# Patient Record
Sex: FEMALE | Race: WHITE | NOT HISPANIC OR LATINO | Employment: OTHER | URBAN - METROPOLITAN AREA
[De-identification: names, ages, dates, MRNs, and addresses within clinical notes are randomized per-mention and may not be internally consistent; named-entity substitution may affect disease eponyms.]

---

## 2018-04-03 PROBLEM — E07.9 THYROID DISEASE: Status: ACTIVE | Noted: 2018-04-03

## 2018-04-03 PROBLEM — E23.0 PANHYPOPITUITARISM: Status: ACTIVE | Noted: 2018-04-03

## 2018-04-03 PROBLEM — E27.40 ADRENAL INSUFFICIENCY: Status: ACTIVE | Noted: 2018-04-03

## 2018-04-03 PROBLEM — R53.1 WEAKNESS: Status: ACTIVE | Noted: 2018-04-03

## 2018-04-03 PROBLEM — M19.90 ARTHRITIS: Status: ACTIVE | Noted: 2018-04-03

## 2018-04-03 PROBLEM — R29.898 LEFT HAND WEAKNESS: Status: ACTIVE | Noted: 2018-04-03

## 2018-04-05 ENCOUNTER — TELEPHONE (OUTPATIENT)
Dept: NEUROLOGY | Facility: CLINIC | Age: 43
End: 2018-04-05

## 2018-04-05 NOTE — TELEPHONE ENCOUNTER
----- Message from Krystle Bell sent at 4/5/2018 10:53 AM CDT -----  Contact: [facility]   Patient need a hospital follow up in 2 weeks please call back at [phone] (home)

## 2018-04-05 NOTE — TELEPHONE ENCOUNTER
Tried to call the patient. No answer. Left voicemail to call back in regards to scheduling an appointment.

## 2018-05-03 ENCOUNTER — HOSPITAL ENCOUNTER (OUTPATIENT)
Dept: RADIOLOGY | Facility: HOSPITAL | Age: 43
Discharge: HOME | End: 2018-05-03
Attending: INTERNAL MEDICINE
Payer: MEDICARE

## 2018-05-03 DIAGNOSIS — R42 DIZZINESS AND GIDDINESS: ICD-10-CM

## 2018-05-03 PROCEDURE — 93880 EXTRACRANIAL BILAT STUDY: CPT | Mod: 26,,, | Performed by: RADIOLOGY

## 2018-05-03 PROCEDURE — 93880 EXTRACRANIAL BILAT STUDY: CPT | Mod: TC,PO

## 2018-05-23 ENCOUNTER — TELEPHONE (OUTPATIENT)
Dept: NEUROLOGY | Facility: CLINIC | Age: 43
End: 2018-05-23

## 2018-05-23 NOTE — TELEPHONE ENCOUNTER
Returned call and spoke with patient. Patient rescheduled for 6/18 at 4:30 pm. Patient verbalized understanding.

## 2018-05-23 NOTE — TELEPHONE ENCOUNTER
----- Message from Magdalena Escobar sent at 5/23/2018  1:07 PM CDT -----  Contact: Patient's mom, Sheridan Duong  Patient's mom is calling to reschedule patient's appointment that she has for today because the patient has really bad stomach cramps and I am not getting another appointment until July.  Please call to reschedule.  Call Back#[phone]  Thanks

## 2018-06-18 ENCOUNTER — LAB VISIT (OUTPATIENT)
Dept: LAB | Facility: HOSPITAL | Age: 43
End: 2018-06-18
Attending: PSYCHIATRY & NEUROLOGY
Payer: MEDICARE

## 2018-06-18 ENCOUNTER — OFFICE VISIT (OUTPATIENT)
Dept: NEUROLOGY | Facility: CLINIC | Age: 43
End: 2018-06-18
Payer: MEDICARE

## 2018-06-18 VITALS
DIASTOLIC BLOOD PRESSURE: 76 MMHG | SYSTOLIC BLOOD PRESSURE: 105 MMHG | HEIGHT: 63 IN | RESPIRATION RATE: 16 BRPM | BODY MASS INDEX: 23.57 KG/M2 | WEIGHT: 133 LBS | HEART RATE: 90 BPM

## 2018-06-18 DIAGNOSIS — Z79.891 CHRONICALLY ON OPIATE THERAPY: ICD-10-CM

## 2018-06-18 DIAGNOSIS — M54.50 CHRONIC BILATERAL LOW BACK PAIN WITHOUT SCIATICA: ICD-10-CM

## 2018-06-18 DIAGNOSIS — G89.29 CHRONIC BILATERAL LOW BACK PAIN WITHOUT SCIATICA: ICD-10-CM

## 2018-06-18 DIAGNOSIS — R29.898 LEFT HAND WEAKNESS: ICD-10-CM

## 2018-06-18 DIAGNOSIS — E23.0 PANHYPOPITUITARISM: ICD-10-CM

## 2018-06-18 DIAGNOSIS — Z79.891 CHRONICALLY ON OPIATE THERAPY: Primary | ICD-10-CM

## 2018-06-18 PROCEDURE — 99999 PR PBB SHADOW E&M-EST. PATIENT-LVL IV: CPT | Mod: PBBFAC,,, | Performed by: PSYCHIATRY & NEUROLOGY

## 2018-06-18 PROCEDURE — 3008F BODY MASS INDEX DOCD: CPT | Mod: S$GLB,,, | Performed by: PSYCHIATRY & NEUROLOGY

## 2018-06-18 PROCEDURE — 80307 DRUG TEST PRSMV CHEM ANLYZR: CPT

## 2018-06-18 PROCEDURE — 99214 OFFICE O/P EST MOD 30 MIN: CPT | Mod: S$GLB,,, | Performed by: PSYCHIATRY & NEUROLOGY

## 2018-06-18 NOTE — PATIENT INSTRUCTIONS
TESTING:  -- request records from Dr. Hunter Lorenzo today  -- urine drug screen today     PREVENTION OF PAIN:   -- continue with above Dr for now until results obtained     AS-NEEDED TREATMENT OF PAIN:  -- continue with above Dr for now until results obtained

## 2018-06-18 NOTE — PROGRESS NOTES
Date of service: 6/18/2018  Referring provider: No ref. provider found    Subjective:      Chief complaint: Weakness (left hand) and Back Pain (mid/low back pain)       Patient ID: Radha Ramirez is a 42 y.o. 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 She has had no new weakness occur.     She currently follows for chronic pain management with neurologist Dr. Hunter Gayle in [facility] but requests to change to me to have closer to home provider. Her main pain is axial back pain with radiation down the legs to the level of the knees. Her current pain score is 8. She last filled her medications on 5/26. Her stable regimen is listed below. Her mother expresses concern as to whether anything else can be done to minimize her medications. She reports she cannot get ESIs due to her chronic steroid use. She reports she has never received lumbar facet blocks / RFAs.    Her pain is currently flared because she recently became dizzy in the setting of UTI, poor PO intake, and fell, on her sacrum. Was evaluated in the ED. XR showed chronic L1 fracture, age indeterminate but new since 4/3/18 T9 fracture, and possible fracture at the sacrococcygeal junction. She was discharged after having been given toradol and percocet in the ED.     ORIGINAL NEURO HISTORY - 4/5/18   Patient woke up yesterday 6am to use restroom, noticed left hand was weak. Went back to sleep woke up at 1030 am, still weak. Reports she cannot extend the fingers. Wrsit ok. NO PAIN. There is no numbness or tingling at all. She slept on her  back, with the left wrist extended and under her chin, but was never on her humerus. No previous episodes.      Incidentally found to have optic nerve thinning by eye doctor - undergoing visual fields for further diagnosis.      Takes MS contin, percocet, benadryl, flexeril before bed but all doses were typical. No alcohol before bed.     HOSPITAL COURSE:  4/5/18: no clinical changes. OT recommended outpatient therapy. MRI cervical done and normal. No pain.      #1 Left hand weakness - occurring after waking up after sleeping with left wrist extended supporting the chin. No numbness at all and no pain. Because this initially appeared to me to be involvement of 3 separate peripheral nerves (median, ulnar, radial) or C7-T1 roots and patient has bilateral babinski I recommend cervical spine imaging with contrast which was completely normal. 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 This single nerve explains all her motor weakness and as it is a pure motor nerve explains why she has no sensory loss. Her inflammatory markers are elevated but the sudden onset and complete lack of pain argue against a mononeuritis multilpex or an early parsonage reina as the diagnosis. Thus, I do think this is compressive, from a position she sustained while sleeping. I expect this to resolve or markedly improve within 3 months.     Current acute treatment:  -- MS Contin 15mg nightly  -- Percocet 7.5mg/325mg QID     Current prevention:    Previously tried:     Procedural history:   -- none      Review of patient's allergies indicates:  No Known  "Allergies  Current Outpatient Prescriptions   Medication Sig Dispense Refill    calcitRIOL (ROCALTROL) 0.5 MCG Cap Take 0.5 mcg by mouth every Mon, Wed, Fri.      cephALEXin (KEFLEX) 500 MG capsule Take 1 capsule (500 mg total) by mouth 4 (four) times daily. 10 capsule 0    cyclobenzaprine (FLEXERIL) 10 MG tablet Take 10 mg by mouth 2 (two) times daily.      ergocalciferol (VITAMIN D2) 50,000 unit Cap Take 50,000 Units by mouth every 7 days.      hydrocortisone (CORTEF) 20 MG Tab Take 10 mg by mouth once daily.      levothyroxine (SYNTHROID, LEVOTHROID) 175 MCG tablet Take 175 mcg by mouth once daily.      liothyronine (CYTOMEL) 5 MCG Tab Take 25 mcg by mouth once daily.      morphine (MS CONTIN) 15 MG 12 hr tablet Take 15 mg by mouth once daily.      oxycodone-acetaminophen (PERCOCET) 5-325 mg per tablet Take 1 tablet by mouth every 6 (six) hours as needed for Pain. 12 tablet 0    promethazine (PHENERGAN) 25 MG tablet Take 25 mg by mouth every 4 (four) hours.       No current facility-administered medications for this visit.        Past Medical History  Past Medical History:   Diagnosis Date    Adrenal insufficiency     Arthritis     "Bulging and herniated lumbar discs"     Chronic instability of knee     Humerus fracture     Panhypopituitarism     Pelvic fracture     Rib fracture     Thyroid disease        Past Surgical History  No past surgical history on file.    Family History  No family history on file.    Social History  Social History     Social History    Marital status: Single     Spouse name: N/A    Number of children: N/A    Years of education: N/A     Occupational History    Not on file.     Social History Main Topics    Smoking status: Never Smoker    Smokeless tobacco: Never Used    Alcohol use No    Drug use: No    Sexual activity: Not on file     Other Topics Concern    Not on file     Social History Narrative    No narrative on file        Review of Systems  14-point " review of systems as follows:   No check adore indicates NEGATIVE response   Constitutional: [] weight loss, [] change to appetite   Eyes: [] change in vision, [] double vision   Ears, nose, mouth, throat: [] frequent nose bleeds, [] ringing in the ears   Respiratory: [] cough, [] wheezing   Cardiovascular: [] chest pain, [] palpitations   Gastrointestinal: [] jaundice, [x] nausea/vomiting   Genitourinary: [] incontinence, [] burning with urination   Hematologic/lymphatic: [x] easy bruising/bleeding, [] night sweats   Neurological: [] numbness, [] weakness   Endocrine: [] fatigue, [] heat/cold intolerance   Allergy/Immunologic: [] fevers, [] chills   Musculoskeletal: [x] muscle pain, [x] joint pain   Psychiatric: [] thoughts of harming self/others, [] depression   Integumentary: [] rashes, [] sores that do not heal     Objective:        Vitals:    06/18/18 1634   BP: 105/76   Pulse: 90   Resp: 16     Body mass index is 23.56 kg/m².    General: Well developed, well nourished.  No acute distress.   HEENT: Atraumatic, normocephalic. Poor dentition.   Neck: Trachea midline  Cardiovascular: Vitals reviewed. Normal peripheral perfusion.   Pulmonary: No increased work of breathing.  Abdomen/GI: No guarding  Musculoskeletal: No obvious joint deformities, moves all extremities symmetrically and well. Hamstrings tight bilaterally. + low lumbar paraspinal tenderness, no SI joint tenderness, no sacral tenderness, JI negative, SLR negative      Neurological exam:  Mental status: Awake and alert. Oriented to situation.  Speech fluent and appropriate. Recent and remote memory appear to be intact.  Fund of knowledge normal.  Cranial nerves: Pupils equal round, extraocular movements intact, facial strength intact bilaterally, hearing grossly intact bilaterally.  Motor: left wrist/finger and thumb extension 5/5   Gait: wheelchair       Data Review:     I have personally reviewed the referring provider's notes, labs, & imaging made  available to me today.     RADIOLOGY STUDIES:  I have personally reviewed the pertinent images performed.     6/8/15 MRI lumbar spine w/o contrast:  L1-2: minimal broad based disc protrusion, moderate facet arthropathy  L2-3: mild broad based disc protrusion L > R, annular tear, moderate facet arthropathy, mild stenosis left lateral recess (with mild compression of left L3 root)  L3-4: mild broad based disc protrusion, mild/moderate facet arthropathy   L4-5: mild broad based disc protrusion, moderate/severe facet arthropathy, mild lateral recess stenosis (with mild compression of L5 nerve roots)  L5-S1: transitional vertebra. Mild broad based disc protrusion R > L. Moderate facet arthropathy. Mild/mod compression of right lateral recess with compression of right S1 root.  Chronic L1 fracture   Chronic healed T11 fracture     Results for orders placed or performed during the hospital encounter of 04/03/18   MRI Brain W WO Contrast    Narrative     MRI BRAIN WITH AND WITHOUT CONTRAST    CPT: 98380    Clinical data:  Neurological deficit.  Left hand paralysis.    Comparison: CT head 04/03/2018    Technique: Multiplanar, multisequence MRI of the brain was performed before and after administration of contrast.     Findings:     The diffusion weighted imaging is negative for acute or subacute ischemia.  Gray-white matter differentiation appears within normal limits.  No extra-axial fluid collection, hydrocephalus, mass effect, midline shift is identified.  There is no acute hemorrhage.  Visualized vascular flow voids appear intact.  Visualized orbits appear unremarkable.  Visualized paranasal sinuses and mastoid air cells appear clear.  No mass is identified.  No pathologic enhancement is noted.  There is demonstrated a shallow sella turcica with minimal pituitary tissue identified.    Impression       1. No acute intracranial abnormality.  2. No pathologic enhancement.      Electronically signed by: NITHIN HOYT  MD  Date:     04/03/18  Time:    19:40    CT Head Without Contrast    Narrative    CT scan of the head without contrast    Clinical history: Weakness in the fingers of the left hand    [id number]    Findings: Multiple computerized images of the head were obtained in axial projection using thin slices.  No intravenous contrast was administered.  The study was viewed at soft tissue and bone window settings.  Reconstructions were done in coronal and sagittal planes.    No focal lesions are identified.  There is no evidence of ventricular dilatation, midline shift, or hemorrhage.  No territorial infarct was visualized.  The gray-white junction is maintained throughout.  No extracerebral fluid collection was demonstrated.  The bony calvarium appears intact.  No sinus or mastoid abnormality was noted.  The sella is shallow and very small which is of uncertain significance.  This might be better evaluated with an MRI scan.    Impression     There are no acute findings.  Note is made of the sella being shallow and somewhat small which is of uncertain significance.  An MRI scan may be helpful in further evaluation of this area.      Electronically signed by: RUTHIE MO MD  Date:     04/03/18  Time:    16:23        Lab Results   Component Value Date     (L) 06/17/2018    K 3.6 06/17/2018    MG 1.7 04/03/2018    CL 96 06/17/2018    CO2 27 06/17/2018    BUN 11 06/17/2018    CREATININE 0.74 06/17/2018    GLU 71 06/17/2018    HGBA1C 4.9 04/04/2018    AST 86 (H) 06/17/2018    ALT 46 (H) 06/17/2018    ALBUMIN 3.3 (L) 06/17/2018    PROT 6.4 06/17/2018    BILITOT 0.3 06/17/2018       Lab Results   Component Value Date    WBC 5.49 06/17/2018    HGB 11.1 (L) 06/17/2018    HCT 33.1 (L) 06/17/2018    MCV 85 06/17/2018     06/17/2018       Lab Results   Component Value Date    TSH 0.918 04/03/2018       Assessment & Plan:       Problem List Items Addressed This Visit        Psychiatric    Chronically on opiate therapy -  Primary    Overview     Patient would like me to take over her pain management. Request recent clinic visits / procedure notes from current physician and obtain urine drug screen today. If all concordant then will take over starting next month.          Relevant Orders    Pain Clinic Drug Screen       Endocrine    Panhypopituitarism    Overview     Since birth, on chronic hydrocortisone and thyroid replacement            Orthopedic    Left hand weakness    Overview     Due to acute posterior interosseus neuropathy (branch of radial) likely related to sleeping position. Resolved after 1 week without further problems.          Chronic bilateral low back pain without sciatica    Overview     Axial back pain more so that radicular radiation down to knees. 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 Paraspinals tender. SLR negative. SI joint non tender. Pain most likely facetogenic. Request records from previous MD and likely offer bilateral LMBB in the future.                    TESTING:  -- request records from Dr. Hunter Lorenzo today  -- urine drug screen today     PREVENTION OF PAIN:   -- continue with above Dr for now until results obtained     AS-NEEDED TREATMENT OF PAIN:  -- continue with above Dr for now until results obtained     Follow-up in about 4 weeks (around 7/16/2018).    Kelly Prajapati MD

## 2018-06-21 ENCOUNTER — TELEPHONE (OUTPATIENT)
Dept: NEUROLOGY | Facility: CLINIC | Age: 43
End: 2018-06-21

## 2018-06-24 LAB
6MAM UR QL: NOT DETECTED
7AMINOCLONAZEPAM UR QL: NOT DETECTED
A-OH ALPRAZ UR QL: NOT DETECTED
ALPRAZ UR QL: NOT DETECTED
AMPHET UR QL SCN: NOT DETECTED
ANNOTATION COMMENT IMP: NORMAL
ANNOTATION COMMENT IMP: NORMAL
BARBITURATES UR QL: NOT DETECTED
BUPRENORPHINE UR QL: NOT DETECTED
BZE UR QL: NOT DETECTED
CARBOXYTHC UR QL: NOT DETECTED
CARISOPRODOL UR QL: NOT DETECTED
CLONAZEPAM UR QL: NOT DETECTED
CODEINE UR QL: NOT DETECTED
CREAT UR-MCNC: 81.9 MG/DL (ref 20–400)
DIAZEPAM UR QL: NOT DETECTED
ETHYL GLUCURONIDE UR QL: NOT DETECTED
FENTANYL UR QL: PRESENT
HYDROCODONE UR QL: NOT DETECTED
HYDROMORPHONE UR QL: NOT DETECTED
LORAZEPAM UR QL: NOT DETECTED
MDA UR QL: NOT DETECTED
MDEA UR QL: NOT DETECTED
MDMA UR QL: NOT DETECTED
ME-PHENIDATE UR QL: NOT DETECTED
MEPERIDINE UR QL: NOT DETECTED
METHADONE UR QL: NOT DETECTED
METHAMPHET UR QL: NOT DETECTED
MIDAZOLAM UR QL SCN: NOT DETECTED
MORPHINE UR QL: PRESENT
NORBUPRENORPHINE UR QL CFM: NOT DETECTED
NORDIAZEPAM UR QL: NOT DETECTED
NORFENTANYL UR QL: PRESENT
NORHYDROCODONE UR QL CFM: NOT DETECTED
NOROXYCODONE UR QL CFM: PRESENT
NOROXYMORPHONE: NOT DETECTED
OXAZEPAM UR QL: NOT DETECTED
OXYCODONE UR QL: PRESENT
OXYMORPHONE UR QL: NOT DETECTED
PATHOLOGY STUDY: NORMAL
PCP UR QL: NOT DETECTED
PHENTERMINE UR QL: NOT DETECTED
PROPOXYPH UR QL: NOT DETECTED
SERVICE CMNT-IMP: NORMAL
TAPENTADOL UR QL SCN: NOT DETECTED
TAPENTADOL-O-SULF: NOT DETECTED
TEMAZEPAM UR QL: NOT DETECTED
TRAMADOL UR QL: NOT DETECTED
ZOLPIDEM UR QL: NOT DETECTED

## 2018-07-17 ENCOUNTER — TELEPHONE (OUTPATIENT)
Dept: NEUROLOGY | Facility: CLINIC | Age: 43
End: 2018-07-17

## 2018-07-17 NOTE — TELEPHONE ENCOUNTER
----- Message from Magdalena Escobar sent at 7/17/2018  9:07 AM CDT -----  Contact: Patient  Patient is calling to speak with someone to reschedule her appointment tomorrow.  Call Back#[phone]  Thanks

## 2018-07-20 ENCOUNTER — OFFICE VISIT (OUTPATIENT)
Dept: NEUROLOGY | Facility: CLINIC | Age: 43
End: 2018-07-20
Payer: MEDICARE

## 2018-07-20 VITALS
DIASTOLIC BLOOD PRESSURE: 72 MMHG | BODY MASS INDEX: 22.38 KG/M2 | RESPIRATION RATE: 16 BRPM | SYSTOLIC BLOOD PRESSURE: 134 MMHG | HEIGHT: 63 IN | WEIGHT: 126.31 LBS | HEART RATE: 107 BPM

## 2018-07-20 DIAGNOSIS — G89.29 CHRONIC BILATERAL LOW BACK PAIN WITHOUT SCIATICA: Primary | ICD-10-CM

## 2018-07-20 DIAGNOSIS — M47.816 SPONDYLOSIS OF LUMBAR REGION WITHOUT MYELOPATHY OR RADICULOPATHY: ICD-10-CM

## 2018-07-20 DIAGNOSIS — M54.50 CHRONIC BILATERAL LOW BACK PAIN WITHOUT SCIATICA: Primary | ICD-10-CM

## 2018-07-20 DIAGNOSIS — E23.0 PANHYPOPITUITARISM: ICD-10-CM

## 2018-07-20 DIAGNOSIS — Z79.891 CHRONICALLY ON OPIATE THERAPY: ICD-10-CM

## 2018-07-20 PROBLEM — R29.898 LEFT HAND WEAKNESS: Status: RESOLVED | Noted: 2018-04-03 | Resolved: 2018-07-20

## 2018-07-20 PROCEDURE — 3008F BODY MASS INDEX DOCD: CPT | Mod: S$GLB,,, | Performed by: PSYCHIATRY & NEUROLOGY

## 2018-07-20 PROCEDURE — 99999 PR PBB SHADOW E&M-EST. PATIENT-LVL III: CPT | Mod: PBBFAC,,, | Performed by: PSYCHIATRY & NEUROLOGY

## 2018-07-20 PROCEDURE — 99214 OFFICE O/P EST MOD 30 MIN: CPT | Mod: S$GLB,,, | Performed by: PSYCHIATRY & NEUROLOGY

## 2018-07-20 RX ORDER — DULOXETIN HYDROCHLORIDE 20 MG/1
40 CAPSULE, DELAYED RELEASE ORAL DAILY
Qty: 60 CAPSULE | Refills: 11 | Status: SHIPPED | OUTPATIENT
Start: 2018-07-20 | End: 2019-09-24 | Stop reason: SDUPTHER

## 2018-07-20 RX ORDER — OXYCODONE AND ACETAMINOPHEN 7.5; 325 MG/1; MG/1
1 TABLET ORAL 4 TIMES DAILY PRN
Qty: 120 TABLET | Refills: 0 | Status: SHIPPED | OUTPATIENT
Start: 2018-07-25 | End: 2018-08-17 | Stop reason: SDUPTHER

## 2018-07-20 RX ORDER — CYCLOBENZAPRINE HCL 10 MG
10 TABLET ORAL 2 TIMES DAILY
Qty: 60 TABLET | Refills: 11 | Status: SHIPPED | OUTPATIENT
Start: 2018-07-20 | End: 2019-05-18

## 2018-07-20 RX ORDER — MORPHINE SULFATE 15 MG/1
15 TABLET, FILM COATED, EXTENDED RELEASE ORAL DAILY
Qty: 30 TABLET | Refills: 0 | Status: SHIPPED | OUTPATIENT
Start: 2018-07-25 | End: 2018-08-17 | Stop reason: SDUPTHER

## 2018-07-20 NOTE — H&P (VIEW-ONLY)
Date of service: 7/20/2018  Referring provider: No ref. provider found    Subjective:      Chief complaint: Back Pain       Patient ID: Radha Ramirez is a 42 y.o. 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 Pain is in the middle and lower back, currently 6/10, range 4 to 10.     She reports she had new Xrays done showing new compression fractures with her PCP. She is pending an MRI. She will also be getting a bone density scan.    Current back pain is 6/10 with range of 4 to 10.       INTERVAL HISTORY - 6/18/18     The below problem (posterior interosseus neuropathy) has completely resolved and did so really a week later after onset. She has had no new weakness occur.     She currently follows for chronic pain management with neurologist Dr. Hunter Gayle in [facility] but requests to change to me to have closer to home provider. Her main pain is axial back pain with radiation down the legs to the level of the knees. Her current pain score is 8. She last filled her medications on 5/26. Her stable regimen is listed below. Her mother expresses concern as to whether anything else can be done to minimize her medications. She reports she cannot get ESIs due to her chronic steroid use. She reports she has never received lumbar facet blocks / RFAs.    Her pain is currently flared because she recently became dizzy in the setting of UTI, poor PO intake, and fell, on her sacrum. Was evaluated in the ED. XR showed chronic L1 fracture, age indeterminate but new since 4/3/18 T9 fracture, and possible fracture at the sacrococcygeal junction. She was discharged after having been given toradol and  percocet in the ED.     ORIGINAL NEURO HISTORY - 4/5/18   Patient woke up yesterday 6am to use restroom, noticed left hand was weak. Went back to sleep woke up at 1030 am, still weak. Reports she cannot extend the fingers. Wrsit ok. NO PAIN. There is no numbness or tingling at all. She slept on her back, with the left wrist extended and under her chin, but was never on her humerus. No previous episodes.      Incidentally found to have optic nerve thinning by eye doctor - undergoing visual fields for further diagnosis.      Takes MS contin, percocet, benadryl, flexeril before bed but all doses were typical. No alcohol before bed.     HOSPITAL COURSE:  4/5/18: no clinical changes. OT recommended outpatient therapy. MRI cervical done and normal. No pain.      #1 Left hand weakness - occurring after waking up after sleeping with left wrist extended supporting the chin. No numbness at all and no pain. Because this initially appeared to me to be involvement of 3 separate peripheral nerves (median, ulnar, radial) or C7-T1 roots and patient has bilateral babinski I recommend cervical spine imaging with contrast which was completely normal. 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 This single nerve explains all her motor weakness and as it is a pure motor nerve explains why she has no sensory loss. Her inflammatory markers are elevated but the sudden onset and complete lack of pain argue against a mononeuritis multilpex or an early parsonage reina as the diagnosis. Thus, I do think this is compressive, from a position she sustained  "while sleeping. I expect this to resolve or markedly improve within 3 months.     Current acute treatment:  -- MS Contin 15mg nightly  -- Percocet 7.5mg/325mg QID     Current prevention:    Previously tried:     Procedural history:   -- none      Review of patient's allergies indicates:  No Known Allergies  Current Outpatient Prescriptions   Medication Sig Dispense Refill    calcitRIOL (ROCALTROL) 0.5 MCG Cap Take 0.5 mcg by mouth every Mon, Wed, Fri.      cyclobenzaprine (FLEXERIL) 10 MG tablet Take 1 tablet (10 mg total) by mouth 2 (two) times daily. 60 tablet 11    ergocalciferol (VITAMIN D2) 50,000 unit Cap Take 50,000 Units by mouth every 7 days.      hydrocortisone (CORTEF) 20 MG Tab Take 10 mg by mouth once daily.      levothyroxine (SYNTHROID, LEVOTHROID) 175 MCG tablet Take 175 mcg by mouth once daily.      liothyronine (CYTOMEL) 5 MCG Tab Take 25 mcg by mouth once daily.      [START ON 7/25/2018] morphine (MS CONTIN) 15 MG 12 hr tablet Take 1 tablet (15 mg total) by mouth once daily. 30 tablet 0    promethazine (PHENERGAN) 25 MG tablet Take 25 mg by mouth every 4 (four) hours.      DULoxetine (CYMBALTA) 20 MG capsule Take 2 capsules (40 mg total) by mouth once daily. 60 capsule 11    [START ON 7/25/2018] oxyCODONE-acetaminophen (PERCOCET) 7.5-325 mg per tablet Take 1 tablet by mouth 4 (four) times daily as needed for Pain. 120 tablet 0     No current facility-administered medications for this visit.        Past Medical History  Past Medical History:   Diagnosis Date    Adrenal insufficiency     Arthritis     "Bulging and herniated lumbar discs"     Chronic instability of knee     Humerus fracture     Panhypopituitarism     Pelvic fracture     Rib fracture     Thyroid disease        Past Surgical History  No past surgical history on file.    Family History  No family history on file.    Social History  Social History     Social History    Marital status: Single     Spouse name: N/A    " Number of children: N/A    Years of education: N/A     Occupational History    Not on file.     Social History Main Topics    Smoking status: Never Smoker    Smokeless tobacco: Never Used    Alcohol use No    Drug use: No    Sexual activity: Not on file     Other Topics Concern    Not on file     Social History Narrative    No narrative on file        Review of Systems  14-point review of systems as follows:   No check adore indicates NEGATIVE response   Constitutional: [] weight loss, [] change to appetite   Eyes: [] change in vision, [] double vision   Ears, nose, mouth, throat: [] frequent nose bleeds, [] ringing in the ears   Respiratory: [] cough, [] wheezing   Cardiovascular: [] chest pain, [] palpitations   Gastrointestinal: [] jaundice, [] nausea/vomiting   Genitourinary: [] incontinence, [] burning with urination   Hematologic/lymphatic: [x] easy bruising/bleeding, [] night sweats   Neurological: [] numbness, [] weakness   Endocrine: [] fatigue, [] heat/cold intolerance   Allergy/Immunologic: [] fevers, [] chills   Musculoskeletal: [] muscle pain, [x] joint pain   Psychiatric: [] thoughts of harming self/others, [] depression   Integumentary: [] rashes, [] sores that do not heal     Objective:        Vitals:    07/20/18 1430   BP: 134/72   Pulse: 107   Resp: 16     Body mass index is 22.38 kg/m².    General: Well developed, well nourished.  No acute distress.   HEENT: Atraumatic, normocephalic. Poor dentition.   Neck: Trachea midline  Cardiovascular: Vitals reviewed. Normal peripheral perfusion.   Pulmonary: No increased work of breathing.  Abdomen/GI: No guarding  Musculoskeletal: No obvious joint deformities, moves all extremities symmetrically and well.     LUMBAR SPINE:  INSPECTION: no lesions   ROM: normal flexion but limited extension and lateral rotation   MUSCLE SPASM: mild   PARASPINAL TENDERNESS: bilateral   FACET LOADING: bilateral left worse than right   SI JOINT TENDERNESS: neg  JI:  neg  STRAIGHT LEG RAISE: neg  CROSSED STRAIGHT LEG RAISE: neg  HIP INTERNAL ROM: normal  HIP EXTERNAL ROM: normal   GT TENDERNESS:          Data Review:     I have personally reviewed the referring provider's notes, labs, & imaging made available to me today.     RADIOLOGY STUDIES:  I have personally reviewed the pertinent images performed.     6/8/15 MRI lumbar spine w/o contrast:  L1-2: minimal broad based disc protrusion, moderate facet arthropathy  L2-3: mild broad based disc protrusion L > R, annular tear, moderate facet arthropathy, mild stenosis left lateral recess (with mild compression of left L3 root)  L3-4: mild broad based disc protrusion, mild/moderate facet arthropathy   L4-5: mild broad based disc protrusion, moderate/severe facet arthropathy, mild lateral recess stenosis (with mild compression of L5 nerve roots)  L5-S1: transitional vertebra. Mild broad based disc protrusion R > L. Moderate facet arthropathy. Mild/mod compression of right lateral recess with compression of right S1 root.  Chronic L1 fracture   Chronic healed T11 fracture     Results for orders placed or performed during the hospital encounter of 04/03/18   MRI Brain W WO Contrast    Narrative     MRI BRAIN WITH AND WITHOUT CONTRAST    CPT: 49431    Clinical data:  Neurological deficit.  Left hand paralysis.    Comparison: CT head 04/03/2018    Technique: Multiplanar, multisequence MRI of the brain was performed before and after administration of contrast.     Findings:     The diffusion weighted imaging is negative for acute or subacute ischemia.  Gray-white matter differentiation appears within normal limits.  No extra-axial fluid collection, hydrocephalus, mass effect, midline shift is identified.  There is no acute hemorrhage.  Visualized vascular flow voids appear intact.  Visualized orbits appear unremarkable.  Visualized paranasal sinuses and mastoid air cells appear clear.  No mass is identified.  No pathologic enhancement is  noted.  There is demonstrated a shallow sella turcica with minimal pituitary tissue identified.    Impression       1. No acute intracranial abnormality.  2. No pathologic enhancement.      Electronically signed by: NITHIN HOYT MD  Date:     04/03/18  Time:    19:40    CT Head Without Contrast    Narrative    CT scan of the head without contrast    Clinical history: Weakness in the fingers of the left hand    [id number]    Findings: Multiple computerized images of the head were obtained in axial projection using thin slices.  No intravenous contrast was administered.  The study was viewed at soft tissue and bone window settings.  Reconstructions were done in coronal and sagittal planes.    No focal lesions are identified.  There is no evidence of ventricular dilatation, midline shift, or hemorrhage.  No territorial infarct was visualized.  The gray-white junction is maintained throughout.  No extracerebral fluid collection was demonstrated.  The bony calvarium appears intact.  No sinus or mastoid abnormality was noted.  The sella is shallow and very small which is of uncertain significance.  This might be better evaluated with an MRI scan.    Impression     There are no acute findings.  Note is made of the sella being shallow and somewhat small which is of uncertain significance.  An MRI scan may be helpful in further evaluation of this area.      Electronically signed by: RUTHIE MO MD  Date:     04/03/18  Time:    16:23        Lab Results   Component Value Date     (L) 06/17/2018    K 3.6 06/17/2018    MG 1.7 04/03/2018    CL 96 06/17/2018    CO2 27 06/17/2018    BUN 11 06/17/2018    CREATININE 0.74 06/17/2018    GLU 71 06/17/2018    HGBA1C 4.9 04/04/2018    AST 86 (H) 06/17/2018    ALT 46 (H) 06/17/2018    ALBUMIN 3.3 (L) 06/17/2018    PROT 6.4 06/17/2018    BILITOT 0.3 06/17/2018       Lab Results   Component Value Date    WBC 5.49 06/17/2018    HGB 11.1 (L) 06/17/2018    HCT 33.1 (L) 06/17/2018     MCV 85 06/17/2018     06/17/2018       Lab Results   Component Value Date    TSH 0.918 04/03/2018       Assessment & Plan:       Problem List Items Addressed This Visit        Neuro    Spondylosis of lumbar region without myelopathy or radiculopathy - Primary    Overview     Axial back pain more so that radicular radiation down to knees. 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 Paraspinals tender. SLR negative. SI joint non tender. + facet loading bilaterally. Pain most likely facetogenic given this exam pattern and worse pain with extension / roation.    Reviewed the records from her previous pain physician, patient had never been offered LMBBs before. She is an excellent candidate for diagsnotic LMBBs which I will perform bilaterally at L2-3-4-5 with plan to move on to RFA if >50% pain relief achieved during diagnostic period    Trial duloxetine for pain          Relevant Medications    morphine (MS CONTIN) 15 MG 12 hr tablet (Start on 7/25/2018)    oxyCODONE-acetaminophen (PERCOCET) 7.5-325 mg per tablet (Start on 7/25/2018)    cyclobenzaprine (FLEXERIL) 10 MG tablet    DULoxetine (CYMBALTA) 20 MG capsule       Psychiatric    Chronically on opiate therapy    Overview     Patient would like me to take over her chronic pain management. Reviewed her previous pain physician's notes which show good relief and no signs of adverse behavior. She has been on the same regimen for chronic low back pain for years. Her UDS was consistent with prescribed substances. Opiod risk tool shows she is low risk with score 1. Her MME is low at 60 MME daily.     Sign chronic opiate contract  Continue MS contin 30mg daily  Continue Percocet 7.5mg/325mg QID prn          Relevant Medications    morphine (MS CONTIN) 15 MG 12 hr tablet (Start on 7/25/2018)     oxyCODONE-acetaminophen (PERCOCET) 7.5-325 mg per tablet (Start on 7/25/2018)       Endocrine    Panhypopituitarism    Overview     Since birth, on chronic hydrocortisone and thyroid replacement                   This patient has failed multiple conservative and non-invasive therapies. This procedure is medically necessary to improve the patient's quality of life through decreased pain and improved function. To date the patient has tried and failed at least 2 months of opioid medications, physical therapy, and NSAID therapy. After the procedure the plan is to continue home exercise regimen and rehabilitation as tolerated.     Discussed the risk, benefits, and alternatives with the patient. Patient wishes to proceed with scheduling of two diagnostic lumbar medial branch blocks at L2-3-4-5 on 8/9/18 and 8/16/18     The patient voiced understanding that the produce has risk including but not limited to coma, paralysis, myocardial infarction, infection, bleeding, and death.    TESTING:  -- request new Xr records from primary doctor     PREVENTION OF PAIN:   -- begin duloxetine 20mg in AM x 7 days then raise to 40mg daily   -- return to surgery center for two diagnostic lumbar medial branch blocks at L2-3-4-5 on 8/9/18 and 8/16/18 - no need to stop any medications     AS-NEEDED TREATMENT OF PAIN:  -- refill MS Contin 15mg daily #30 7/25/18 - 8/24/18  -- refill Percocet 7.5mg/325mg 4x per day #120    -- refill Flexeril 0mg twice a day     No Follow-up on file. after procedure     Kelly Prajapati MD

## 2018-07-20 NOTE — PATIENT INSTRUCTIONS
TESTING:  -- request new Xr records from primary doctor     PREVENTION OF PAIN:   -- begin duloxetine 20mg in AM x 7 days then raise to 40mg daily   -- return to surgery center for two diagnostic lumbar medial branch blocks at L2-3-4-5 on 8/9/18 and 8/16/18 - no need to stop any medications     AS-NEEDED TREATMENT OF PAIN:  -- refill MS Contin 15mg daily #30 7/25/18 - 8/24/18  -- refill Percocet 7.5mg/325mg 4x per day #120    -- refill Flexeril 0mg twice a day

## 2018-07-20 NOTE — PROGRESS NOTES
Date of service: 7/20/2018  Referring provider: No ref. provider found    Subjective:      Chief complaint: Back Pain       Patient ID: Radha Ramirez is a 42 y.o. 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 Pain is in the middle and lower back, currently 6/10, range 4 to 10.     She reports she had new Xrays done showing new compression fractures with her PCP. She is pending an MRI. She will also be getting a bone density scan.    Current back pain is 6/10 with range of 4 to 10.       INTERVAL HISTORY - 6/18/18     The below problem (posterior interosseus neuropathy) has completely resolved and did so really a week later after onset. She has had no new weakness occur.     She currently follows for chronic pain management with neurologist Dr. Hunter Gayle in [facility] but requests to change to me to have closer to home provider. Her main pain is axial back pain with radiation down the legs to the level of the knees. Her current pain score is 8. She last filled her medications on 5/26. Her stable regimen is listed below. Her mother expresses concern as to whether anything else can be done to minimize her medications. She reports she cannot get ESIs due to her chronic steroid use. She reports she has never received lumbar facet blocks / RFAs.    Her pain is currently flared because she recently became dizzy in the setting of UTI, poor PO intake, and fell, on her sacrum. Was evaluated in the ED. XR showed chronic L1 fracture, age indeterminate but new since 4/3/18 T9 fracture, and possible fracture at the sacrococcygeal junction. She was discharged after having been given toradol and  percocet in the ED.     ORIGINAL NEURO HISTORY - 4/5/18   Patient woke up yesterday 6am to use restroom, noticed left hand was weak. Went back to sleep woke up at 1030 am, still weak. Reports she cannot extend the fingers. Wrsit ok. NO PAIN. There is no numbness or tingling at all. She slept on her back, with the left wrist extended and under her chin, but was never on her humerus. No previous episodes.      Incidentally found to have optic nerve thinning by eye doctor - undergoing visual fields for further diagnosis.      Takes MS contin, percocet, benadryl, flexeril before bed but all doses were typical. No alcohol before bed.     HOSPITAL COURSE:  4/5/18: no clinical changes. OT recommended outpatient therapy. MRI cervical done and normal. No pain.      #1 Left hand weakness - occurring after waking up after sleeping with left wrist extended supporting the chin. No numbness at all and no pain. Because this initially appeared to me to be involvement of 3 separate peripheral nerves (median, ulnar, radial) or C7-T1 roots and patient has bilateral babinski I recommend cervical spine imaging with contrast which was completely normal. 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 This single nerve explains all her motor weakness and as it is a pure motor nerve explains why she has no sensory loss. Her inflammatory markers are elevated but the sudden onset and complete lack of pain argue against a mononeuritis multilpex or an early parsonage reina as the diagnosis. Thus, I do think this is compressive, from a position she sustained  "while sleeping. I expect this to resolve or markedly improve within 3 months.     Current acute treatment:  -- MS Contin 15mg nightly  -- Percocet 7.5mg/325mg QID     Current prevention:    Previously tried:     Procedural history:   -- none      Review of patient's allergies indicates:  No Known Allergies  Current Outpatient Prescriptions   Medication Sig Dispense Refill    calcitRIOL (ROCALTROL) 0.5 MCG Cap Take 0.5 mcg by mouth every Mon, Wed, Fri.      cyclobenzaprine (FLEXERIL) 10 MG tablet Take 1 tablet (10 mg total) by mouth 2 (two) times daily. 60 tablet 11    ergocalciferol (VITAMIN D2) 50,000 unit Cap Take 50,000 Units by mouth every 7 days.      hydrocortisone (CORTEF) 20 MG Tab Take 10 mg by mouth once daily.      levothyroxine (SYNTHROID, LEVOTHROID) 175 MCG tablet Take 175 mcg by mouth once daily.      liothyronine (CYTOMEL) 5 MCG Tab Take 25 mcg by mouth once daily.      [START ON 7/25/2018] morphine (MS CONTIN) 15 MG 12 hr tablet Take 1 tablet (15 mg total) by mouth once daily. 30 tablet 0    promethazine (PHENERGAN) 25 MG tablet Take 25 mg by mouth every 4 (four) hours.      DULoxetine (CYMBALTA) 20 MG capsule Take 2 capsules (40 mg total) by mouth once daily. 60 capsule 11    [START ON 7/25/2018] oxyCODONE-acetaminophen (PERCOCET) 7.5-325 mg per tablet Take 1 tablet by mouth 4 (four) times daily as needed for Pain. 120 tablet 0     No current facility-administered medications for this visit.        Past Medical History  Past Medical History:   Diagnosis Date    Adrenal insufficiency     Arthritis     "Bulging and herniated lumbar discs"     Chronic instability of knee     Humerus fracture     Panhypopituitarism     Pelvic fracture     Rib fracture     Thyroid disease        Past Surgical History  No past surgical history on file.    Family History  No family history on file.    Social History  Social History     Social History    Marital status: Single     Spouse name: N/A    " Number of children: N/A    Years of education: N/A     Occupational History    Not on file.     Social History Main Topics    Smoking status: Never Smoker    Smokeless tobacco: Never Used    Alcohol use No    Drug use: No    Sexual activity: Not on file     Other Topics Concern    Not on file     Social History Narrative    No narrative on file        Review of Systems  14-point review of systems as follows:   No check adore indicates NEGATIVE response   Constitutional: [] weight loss, [] change to appetite   Eyes: [] change in vision, [] double vision   Ears, nose, mouth, throat: [] frequent nose bleeds, [] ringing in the ears   Respiratory: [] cough, [] wheezing   Cardiovascular: [] chest pain, [] palpitations   Gastrointestinal: [] jaundice, [] nausea/vomiting   Genitourinary: [] incontinence, [] burning with urination   Hematologic/lymphatic: [x] easy bruising/bleeding, [] night sweats   Neurological: [] numbness, [] weakness   Endocrine: [] fatigue, [] heat/cold intolerance   Allergy/Immunologic: [] fevers, [] chills   Musculoskeletal: [] muscle pain, [x] joint pain   Psychiatric: [] thoughts of harming self/others, [] depression   Integumentary: [] rashes, [] sores that do not heal     Objective:        Vitals:    07/20/18 1430   BP: 134/72   Pulse: 107   Resp: 16     Body mass index is 22.38 kg/m².    General: Well developed, well nourished.  No acute distress.   HEENT: Atraumatic, normocephalic. Poor dentition.   Neck: Trachea midline  Cardiovascular: Vitals reviewed. Normal peripheral perfusion.   Pulmonary: No increased work of breathing.  Abdomen/GI: No guarding  Musculoskeletal: No obvious joint deformities, moves all extremities symmetrically and well.     LUMBAR SPINE:  INSPECTION: no lesions   ROM: normal flexion but limited extension and lateral rotation   MUSCLE SPASM: mild   PARASPINAL TENDERNESS: bilateral   FACET LOADING: bilateral left worse than right   SI JOINT TENDERNESS: neg  JI:  neg  STRAIGHT LEG RAISE: neg  CROSSED STRAIGHT LEG RAISE: neg  HIP INTERNAL ROM: normal  HIP EXTERNAL ROM: normal   GT TENDERNESS:          Data Review:     I have personally reviewed the referring provider's notes, labs, & imaging made available to me today.     RADIOLOGY STUDIES:  I have personally reviewed the pertinent images performed.     6/8/15 MRI lumbar spine w/o contrast:  L1-2: minimal broad based disc protrusion, moderate facet arthropathy  L2-3: mild broad based disc protrusion L > R, annular tear, moderate facet arthropathy, mild stenosis left lateral recess (with mild compression of left L3 root)  L3-4: mild broad based disc protrusion, mild/moderate facet arthropathy   L4-5: mild broad based disc protrusion, moderate/severe facet arthropathy, mild lateral recess stenosis (with mild compression of L5 nerve roots)  L5-S1: transitional vertebra. Mild broad based disc protrusion R > L. Moderate facet arthropathy. Mild/mod compression of right lateral recess with compression of right S1 root.  Chronic L1 fracture   Chronic healed T11 fracture     Results for orders placed or performed during the hospital encounter of 04/03/18   MRI Brain W WO Contrast    Narrative     MRI BRAIN WITH AND WITHOUT CONTRAST    CPT: 75933    Clinical data:  Neurological deficit.  Left hand paralysis.    Comparison: CT head 04/03/2018    Technique: Multiplanar, multisequence MRI of the brain was performed before and after administration of contrast.     Findings:     The diffusion weighted imaging is negative for acute or subacute ischemia.  Gray-white matter differentiation appears within normal limits.  No extra-axial fluid collection, hydrocephalus, mass effect, midline shift is identified.  There is no acute hemorrhage.  Visualized vascular flow voids appear intact.  Visualized orbits appear unremarkable.  Visualized paranasal sinuses and mastoid air cells appear clear.  No mass is identified.  No pathologic enhancement is  noted.  There is demonstrated a shallow sella turcica with minimal pituitary tissue identified.    Impression       1. No acute intracranial abnormality.  2. No pathologic enhancement.      Electronically signed by: NITHIN HOYT MD  Date:     04/03/18  Time:    19:40    CT Head Without Contrast    Narrative    CT scan of the head without contrast    Clinical history: Weakness in the fingers of the left hand    [id number]    Findings: Multiple computerized images of the head were obtained in axial projection using thin slices.  No intravenous contrast was administered.  The study was viewed at soft tissue and bone window settings.  Reconstructions were done in coronal and sagittal planes.    No focal lesions are identified.  There is no evidence of ventricular dilatation, midline shift, or hemorrhage.  No territorial infarct was visualized.  The gray-white junction is maintained throughout.  No extracerebral fluid collection was demonstrated.  The bony calvarium appears intact.  No sinus or mastoid abnormality was noted.  The sella is shallow and very small which is of uncertain significance.  This might be better evaluated with an MRI scan.    Impression     There are no acute findings.  Note is made of the sella being shallow and somewhat small which is of uncertain significance.  An MRI scan may be helpful in further evaluation of this area.      Electronically signed by: RUTHIE MO MD  Date:     04/03/18  Time:    16:23        Lab Results   Component Value Date     (L) 06/17/2018    K 3.6 06/17/2018    MG 1.7 04/03/2018    CL 96 06/17/2018    CO2 27 06/17/2018    BUN 11 06/17/2018    CREATININE 0.74 06/17/2018    GLU 71 06/17/2018    HGBA1C 4.9 04/04/2018    AST 86 (H) 06/17/2018    ALT 46 (H) 06/17/2018    ALBUMIN 3.3 (L) 06/17/2018    PROT 6.4 06/17/2018    BILITOT 0.3 06/17/2018       Lab Results   Component Value Date    WBC 5.49 06/17/2018    HGB 11.1 (L) 06/17/2018    HCT 33.1 (L) 06/17/2018     MCV 85 06/17/2018     06/17/2018       Lab Results   Component Value Date    TSH 0.918 04/03/2018       Assessment & Plan:       Problem List Items Addressed This Visit        Neuro    Spondylosis of lumbar region without myelopathy or radiculopathy - Primary    Overview     Axial back pain more so that radicular radiation down to knees. 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 Paraspinals tender. SLR negative. SI joint non tender. + facet loading bilaterally. Pain most likely facetogenic given this exam pattern and worse pain with extension / roation.    Reviewed the records from her previous pain physician, patient had never been offered LMBBs before. She is an excellent candidate for diagsnotic LMBBs which I will perform bilaterally at L2-3-4-5 with plan to move on to RFA if >50% pain relief achieved during diagnostic period    Trial duloxetine for pain          Relevant Medications    morphine (MS CONTIN) 15 MG 12 hr tablet (Start on 7/25/2018)    oxyCODONE-acetaminophen (PERCOCET) 7.5-325 mg per tablet (Start on 7/25/2018)    cyclobenzaprine (FLEXERIL) 10 MG tablet    DULoxetine (CYMBALTA) 20 MG capsule       Psychiatric    Chronically on opiate therapy    Overview     Patient would like me to take over her chronic pain management. Reviewed her previous pain physician's notes which show good relief and no signs of adverse behavior. She has been on the same regimen for chronic low back pain for years. Her UDS was consistent with prescribed substances. Opiod risk tool shows she is low risk with score 1. Her MME is low at 60 MME daily.     Sign chronic opiate contract  Continue MS contin 30mg daily  Continue Percocet 7.5mg/325mg QID prn          Relevant Medications    morphine (MS CONTIN) 15 MG 12 hr tablet (Start on 7/25/2018)     oxyCODONE-acetaminophen (PERCOCET) 7.5-325 mg per tablet (Start on 7/25/2018)       Endocrine    Panhypopituitarism    Overview     Since birth, on chronic hydrocortisone and thyroid replacement                   This patient has failed multiple conservative and non-invasive therapies. This procedure is medically necessary to improve the patient's quality of life through decreased pain and improved function. To date the patient has tried and failed at least 2 months of opioid medications, physical therapy, and NSAID therapy. After the procedure the plan is to continue home exercise regimen and rehabilitation as tolerated.     Discussed the risk, benefits, and alternatives with the patient. Patient wishes to proceed with scheduling of two diagnostic lumbar medial branch blocks at L2-3-4-5 on 8/9/18 and 8/16/18     The patient voiced understanding that the produce has risk including but not limited to coma, paralysis, myocardial infarction, infection, bleeding, and death.    TESTING:  -- request new Xr records from primary doctor     PREVENTION OF PAIN:   -- begin duloxetine 20mg in AM x 7 days then raise to 40mg daily   -- return to surgery center for two diagnostic lumbar medial branch blocks at L2-3-4-5 on 8/9/18 and 8/16/18 - no need to stop any medications     AS-NEEDED TREATMENT OF PAIN:  -- refill MS Contin 15mg daily #30 7/25/18 - 8/24/18  -- refill Percocet 7.5mg/325mg 4x per day #120    -- refill Flexeril 0mg twice a day     No Follow-up on file. after procedure     Kelly Prajapati MD

## 2018-07-25 DIAGNOSIS — M47.816 LUMBAR FACET ARTHROPATHY: Primary | ICD-10-CM

## 2018-07-25 RX ORDER — SODIUM CHLORIDE, SODIUM LACTATE, POTASSIUM CHLORIDE, CALCIUM CHLORIDE 600; 310; 30; 20 MG/100ML; MG/100ML; MG/100ML; MG/100ML
INJECTION, SOLUTION INTRAVENOUS CONTINUOUS
Status: CANCELLED | OUTPATIENT
Start: 2018-08-16

## 2018-07-25 RX ORDER — SODIUM CHLORIDE, SODIUM LACTATE, POTASSIUM CHLORIDE, CALCIUM CHLORIDE 600; 310; 30; 20 MG/100ML; MG/100ML; MG/100ML; MG/100ML
INJECTION, SOLUTION INTRAVENOUS CONTINUOUS
Status: CANCELLED | OUTPATIENT
Start: 2018-08-09

## 2018-07-27 ENCOUNTER — TELEPHONE (OUTPATIENT)
Dept: NEUROLOGY | Facility: CLINIC | Age: 43
End: 2018-07-27

## 2018-07-27 NOTE — TELEPHONE ENCOUNTER
----- Message from Giselle Poe sent at 7/27/2018  4:10 PM CDT -----  Contact: Patient  Radha, patient [phone] calling to speak with nurse regarding pain management and her not signing a release so that the office can obtain medical records from her primary care physician. Please advise. Thanks.

## 2018-07-27 NOTE — TELEPHONE ENCOUNTER
Returned patients call in regards to signing pain management contract and release of medical records. Patient has no pain medication at this moment so I informed her that she can come in on Monday to sign her pain management contract. Patient verbalized understanding.

## 2018-08-06 ENCOUNTER — TELEPHONE (OUTPATIENT)
Dept: NEUROLOGY | Facility: CLINIC | Age: 43
End: 2018-08-06

## 2018-08-06 NOTE — TELEPHONE ENCOUNTER
Returned call and spoke with patient. Patient is having a full body MRI with isotopes and would like to make sure it is okay to do and still have her procedure. Patient could not remember the name of the actual scan. Patient is having it at [facility]. Please advise.

## 2018-08-06 NOTE — TELEPHONE ENCOUNTER
----- Message from Nicole Gonzalez sent at 8/6/2018  3:53 PM CDT -----  Contact: Patient  Type: Needs Medical Advice    Who Called:  Patient    Best Call Back Number: [phone]    Additional Information: having tests done by another doctor, wants to make sure it will not interfere with procedure this week

## 2018-08-08 DIAGNOSIS — M51.36 DDD (DEGENERATIVE DISC DISEASE), LUMBAR: Primary | ICD-10-CM

## 2018-08-09 ENCOUNTER — HOSPITAL ENCOUNTER (OUTPATIENT)
Dept: RADIOLOGY | Facility: HOSPITAL | Age: 43
Discharge: HOME | End: 2018-08-09
Attending: PSYCHIATRY & NEUROLOGY | Admitting: PSYCHIATRY & NEUROLOGY
Payer: MEDICARE

## 2018-08-09 ENCOUNTER — HOSPITAL ENCOUNTER (OUTPATIENT)
Facility: HOSPITAL | Age: 43
Discharge: HOME | End: 2018-08-09
Attending: PSYCHIATRY & NEUROLOGY | Admitting: PSYCHIATRY & NEUROLOGY
Payer: MEDICARE

## 2018-08-09 ENCOUNTER — SURGERY (OUTPATIENT)
Age: 43
End: 2018-08-09

## 2018-08-09 VITALS
BODY MASS INDEX: 22.68 KG/M2 | TEMPERATURE: 97 F | OXYGEN SATURATION: 100 % | HEIGHT: 63 IN | DIASTOLIC BLOOD PRESSURE: 59 MMHG | SYSTOLIC BLOOD PRESSURE: 110 MMHG | RESPIRATION RATE: 18 BRPM | WEIGHT: 128 LBS | HEART RATE: 73 BPM

## 2018-08-09 DIAGNOSIS — M51.36 DDD (DEGENERATIVE DISC DISEASE), LUMBAR: ICD-10-CM

## 2018-08-09 DIAGNOSIS — M47.816 LUMBAR FACET ARTHROPATHY: Primary | ICD-10-CM

## 2018-08-09 LAB
B-HCG UR QL: NEGATIVE
CTP QC/QA: YES

## 2018-08-09 PROCEDURE — 64493 INJ PARAVERT F JNT L/S 1 LEV: CPT | Mod: 50,PO | Performed by: PSYCHIATRY & NEUROLOGY

## 2018-08-09 PROCEDURE — 63600175 PHARM REV CODE 636 W HCPCS: Mod: PO | Performed by: PSYCHIATRY & NEUROLOGY

## 2018-08-09 PROCEDURE — 64494 INJ PARAVERT F JNT L/S 2 LEV: CPT | Mod: 50,PO | Performed by: PSYCHIATRY & NEUROLOGY

## 2018-08-09 PROCEDURE — 64494 INJ PARAVERT F JNT L/S 2 LEV: CPT | Mod: 50,,, | Performed by: PSYCHIATRY & NEUROLOGY

## 2018-08-09 PROCEDURE — 25000003 PHARM REV CODE 250: Mod: PO | Performed by: PSYCHIATRY & NEUROLOGY

## 2018-08-09 PROCEDURE — 64495 INJ PARAVERT F JNT L/S 3 LEV: CPT | Mod: 50,,, | Performed by: PSYCHIATRY & NEUROLOGY

## 2018-08-09 PROCEDURE — 81025 URINE PREGNANCY TEST: CPT | Mod: PO | Performed by: PSYCHIATRY & NEUROLOGY

## 2018-08-09 PROCEDURE — 76000 FLUOROSCOPY <1 HR PHYS/QHP: CPT | Mod: TC,PO

## 2018-08-09 PROCEDURE — 64493 INJ PARAVERT F JNT L/S 1 LEV: CPT | Mod: 50,,, | Performed by: PSYCHIATRY & NEUROLOGY

## 2018-08-09 PROCEDURE — 64495 INJ PARAVERT F JNT L/S 3 LEV: CPT | Mod: 50,PO | Performed by: PSYCHIATRY & NEUROLOGY

## 2018-08-09 RX ORDER — BUPIVACAINE HYDROCHLORIDE 2.5 MG/ML
INJECTION, SOLUTION EPIDURAL; INFILTRATION; INTRACAUDAL
Status: DISCONTINUED | OUTPATIENT
Start: 2018-08-09 | End: 2018-08-09 | Stop reason: HOSPADM

## 2018-08-09 RX ORDER — SODIUM CHLORIDE, SODIUM LACTATE, POTASSIUM CHLORIDE, CALCIUM CHLORIDE 600; 310; 30; 20 MG/100ML; MG/100ML; MG/100ML; MG/100ML
INJECTION, SOLUTION INTRAVENOUS CONTINUOUS
Status: DISCONTINUED | OUTPATIENT
Start: 2018-08-09 | End: 2018-08-09 | Stop reason: HOSPADM

## 2018-08-09 RX ORDER — LIDOCAINE HYDROCHLORIDE 20 MG/ML
INJECTION, SOLUTION INFILTRATION; PERINEURAL
Status: DISCONTINUED | OUTPATIENT
Start: 2018-08-09 | End: 2018-08-09 | Stop reason: HOSPADM

## 2018-08-09 RX ORDER — FENTANYL CITRATE 50 UG/ML
INJECTION, SOLUTION INTRAMUSCULAR; INTRAVENOUS
Status: DISCONTINUED | OUTPATIENT
Start: 2018-08-09 | End: 2018-08-09 | Stop reason: HOSPADM

## 2018-08-09 RX ORDER — MIDAZOLAM HYDROCHLORIDE 1 MG/ML
INJECTION INTRAMUSCULAR; INTRAVENOUS
Status: DISCONTINUED | OUTPATIENT
Start: 2018-08-09 | End: 2018-08-09 | Stop reason: HOSPADM

## 2018-08-09 RX ADMIN — FENTANYL CITRATE 25 MCG: 50 INJECTION, SOLUTION INTRAMUSCULAR; INTRAVENOUS at 08:08

## 2018-08-09 RX ADMIN — LIDOCAINE HYDROCHLORIDE 8 ML: 20 INJECTION, SOLUTION INFILTRATION; PERINEURAL at 08:08

## 2018-08-09 RX ADMIN — SODIUM CHLORIDE, SODIUM LACTATE, POTASSIUM CHLORIDE, AND CALCIUM CHLORIDE: .6; .31; .03; .02 INJECTION, SOLUTION INTRAVENOUS at 08:08

## 2018-08-09 RX ADMIN — BUPIVACAINE HYDROCHLORIDE 10 ML: 2.5 INJECTION, SOLUTION EPIDURAL; INFILTRATION; INTRACAUDAL; PERINEURAL at 08:08

## 2018-08-09 RX ADMIN — MIDAZOLAM HYDROCHLORIDE 1 MG: 1 INJECTION, SOLUTION INTRAMUSCULAR; INTRAVENOUS at 08:08

## 2018-08-09 NOTE — INTERVAL H&P NOTE
The patient has been examined and the H&P has been reviewed:    I concur with the findings and no changes have occurred since H&P was written.    Anesthesia/Surgery risks, benefits and alternative options discussed and understood by patient/family.          Active Hospital Problems    Diagnosis  POA    Lumbar facet arthropathy [M46.96]  Yes      Resolved Hospital Problems    Diagnosis Date Resolved POA   No resolved problems to display.

## 2018-08-09 NOTE — DISCHARGE SUMMARY
[facility]  Discharge Note  Short Stay    Admit Date: 8/9/2018    Discharge Date: 8/9/2018    Attending Physician: Kelly Prajapati MD     Discharge Provider: Kelly Prajapati    Diagnoses:  Active Hospital Problems    Diagnosis  POA    *Lumbar facet arthropathy [M46.96]  Yes      Resolved Hospital Problems    Diagnosis Date Resolved POA   No resolved problems to display.       Discharged Condition: good    Final Diagnoses: Lumbar facet arthropathy [M46.96]    Disposition: Home or Self Care    Hospital Course: no complications, uneventful    Outcome of Hospitalization, Treatment, Procedure, or Surgery:  Patient was admitted for outpatient procedure. The patient underwent procedure without complications and are discharged home    Follow up/Patient Instructions:  Follow up as scheduled/Patient has received instructions and follow up date    Medications:  Continue previous medications      Discharge Procedure Orders  Notify your health care provider if you experience any of the following:  temperature >100.4     Notify your health care provider if you experience any of the following:  persistent nausea and vomiting or diarrhea     Notify your health care provider if you experience any of the following:  severe uncontrolled pain     Notify your health care provider if you experience any of the following:  redness, tenderness, or signs of infection (pain, swelling, redness, odor or green/yellow discharge around incision site)     Notify your health care provider if you experience any of the following:  difficulty breathing or increased cough     Notify your health care provider if you experience any of the following:  severe persistent headache     Notify your health care provider if you experience any of the following:  worsening rash     Notify your health care provider if you experience any of the following:  persistent dizziness, light-headedness, or visual disturbances     Notify your health care provider if  you experience any of the following:  increased confusion or weakness     No dressing needed           Discharge Procedure Orders (must include Diet, Follow-up, Activity):    Discharge Procedure Orders (must include Diet, Follow-up, Activity)  Notify your health care provider if you experience any of the following:  temperature >100.4     Notify your health care provider if you experience any of the following:  persistent nausea and vomiting or diarrhea     Notify your health care provider if you experience any of the following:  severe uncontrolled pain     Notify your health care provider if you experience any of the following:  redness, tenderness, or signs of infection (pain, swelling, redness, odor or green/yellow discharge around incision site)     Notify your health care provider if you experience any of the following:  difficulty breathing or increased cough     Notify your health care provider if you experience any of the following:  severe persistent headache     Notify your health care provider if you experience any of the following:  worsening rash     Notify your health care provider if you experience any of the following:  persistent dizziness, light-headedness, or visual disturbances     Notify your health care provider if you experience any of the following:  increased confusion or weakness     No dressing needed           Parent

## 2018-08-09 NOTE — H&P (VIEW-ONLY)
Date of service: 7/20/2018  Referring provider: No ref. provider found    Subjective:      Chief complaint: Back Pain       Patient ID: Radha Ramirez is a 42 y.o. 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 Pain is in the middle and lower back, currently 6/10, range 4 to 10.     She reports she had new Xrays done showing new compression fractures with her PCP. She is pending an MRI. She will also be getting a bone density scan.    Current back pain is 6/10 with range of 4 to 10.       INTERVAL HISTORY - 6/18/18     The below problem (posterior interosseus neuropathy) has completely resolved and did so really a week later after onset. She has had no new weakness occur.     She currently follows for chronic pain management with neurologist Dr. Hunter Gayle in [facility] but requests to change to me to have closer to home provider. Her main pain is axial back pain with radiation down the legs to the level of the knees. Her current pain score is 8. She last filled her medications on 5/26. Her stable regimen is listed below. Her mother expresses concern as to whether anything else can be done to minimize her medications. She reports she cannot get ESIs due to her chronic steroid use. She reports she has never received lumbar facet blocks / RFAs.    Her pain is currently flared because she recently became dizzy in the setting of UTI, poor PO intake, and fell, on her sacrum. Was evaluated in the ED. XR showed chronic L1 fracture, age indeterminate but new since 4/3/18 T9 fracture, and possible fracture at the sacrococcygeal junction. She was discharged after having been given toradol and  percocet in the ED.     ORIGINAL NEURO HISTORY - 4/5/18   Patient woke up yesterday 6am to use restroom, noticed left hand was weak. Went back to sleep woke up at 1030 am, still weak. Reports she cannot extend the fingers. Wrsit ok. NO PAIN. There is no numbness or tingling at all. She slept on her back, with the left wrist extended and under her chin, but was never on her humerus. No previous episodes.      Incidentally found to have optic nerve thinning by eye doctor - undergoing visual fields for further diagnosis.      Takes MS contin, percocet, benadryl, flexeril before bed but all doses were typical. No alcohol before bed.     HOSPITAL COURSE:  4/5/18: no clinical changes. OT recommended outpatient therapy. MRI cervical done and normal. No pain.      #1 Left hand weakness - occurring after waking up after sleeping with left wrist extended supporting the chin. No numbness at all and no pain. Because this initially appeared to me to be involvement of 3 separate peripheral nerves (median, ulnar, radial) or C7-T1 roots and patient has bilateral babinski I recommend cervical spine imaging with contrast which was completely normal. 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 This single nerve explains all her motor weakness and as it is a pure motor nerve explains why she has no sensory loss. Her inflammatory markers are elevated but the sudden onset and complete lack of pain argue against a mononeuritis multilpex or an early parsonage reina as the diagnosis. Thus, I do think this is compressive, from a position she sustained  "while sleeping. I expect this to resolve or markedly improve within 3 months.     Current acute treatment:  -- MS Contin 15mg nightly  -- Percocet 7.5mg/325mg QID     Current prevention:    Previously tried:     Procedural history:   -- none      Review of patient's allergies indicates:  No Known Allergies  Current Outpatient Prescriptions   Medication Sig Dispense Refill    calcitRIOL (ROCALTROL) 0.5 MCG Cap Take 0.5 mcg by mouth every Mon, Wed, Fri.      cyclobenzaprine (FLEXERIL) 10 MG tablet Take 1 tablet (10 mg total) by mouth 2 (two) times daily. 60 tablet 11    ergocalciferol (VITAMIN D2) 50,000 unit Cap Take 50,000 Units by mouth every 7 days.      hydrocortisone (CORTEF) 20 MG Tab Take 10 mg by mouth once daily.      levothyroxine (SYNTHROID, LEVOTHROID) 175 MCG tablet Take 175 mcg by mouth once daily.      liothyronine (CYTOMEL) 5 MCG Tab Take 25 mcg by mouth once daily.      [START ON 7/25/2018] morphine (MS CONTIN) 15 MG 12 hr tablet Take 1 tablet (15 mg total) by mouth once daily. 30 tablet 0    promethazine (PHENERGAN) 25 MG tablet Take 25 mg by mouth every 4 (four) hours.      DULoxetine (CYMBALTA) 20 MG capsule Take 2 capsules (40 mg total) by mouth once daily. 60 capsule 11    [START ON 7/25/2018] oxyCODONE-acetaminophen (PERCOCET) 7.5-325 mg per tablet Take 1 tablet by mouth 4 (four) times daily as needed for Pain. 120 tablet 0     No current facility-administered medications for this visit.        Past Medical History  Past Medical History:   Diagnosis Date    Adrenal insufficiency     Arthritis     "Bulging and herniated lumbar discs"     Chronic instability of knee     Humerus fracture     Panhypopituitarism     Pelvic fracture     Rib fracture     Thyroid disease        Past Surgical History  No past surgical history on file.    Family History  No family history on file.    Social History  Social History     Social History    Marital status: Single     Spouse name: N/A    " Number of children: N/A    Years of education: N/A     Occupational History    Not on file.     Social History Main Topics    Smoking status: Never Smoker    Smokeless tobacco: Never Used    Alcohol use No    Drug use: No    Sexual activity: Not on file     Other Topics Concern    Not on file     Social History Narrative    No narrative on file        Review of Systems  14-point review of systems as follows:   No check adore indicates NEGATIVE response   Constitutional: [] weight loss, [] change to appetite   Eyes: [] change in vision, [] double vision   Ears, nose, mouth, throat: [] frequent nose bleeds, [] ringing in the ears   Respiratory: [] cough, [] wheezing   Cardiovascular: [] chest pain, [] palpitations   Gastrointestinal: [] jaundice, [] nausea/vomiting   Genitourinary: [] incontinence, [] burning with urination   Hematologic/lymphatic: [x] easy bruising/bleeding, [] night sweats   Neurological: [] numbness, [] weakness   Endocrine: [] fatigue, [] heat/cold intolerance   Allergy/Immunologic: [] fevers, [] chills   Musculoskeletal: [] muscle pain, [x] joint pain   Psychiatric: [] thoughts of harming self/others, [] depression   Integumentary: [] rashes, [] sores that do not heal     Objective:        Vitals:    07/20/18 1430   BP: 134/72   Pulse: 107   Resp: 16     Body mass index is 22.38 kg/m².    General: Well developed, well nourished.  No acute distress.   HEENT: Atraumatic, normocephalic. Poor dentition.   Neck: Trachea midline  Cardiovascular: Vitals reviewed. Normal peripheral perfusion.   Pulmonary: No increased work of breathing.  Abdomen/GI: No guarding  Musculoskeletal: No obvious joint deformities, moves all extremities symmetrically and well.     LUMBAR SPINE:  INSPECTION: no lesions   ROM: normal flexion but limited extension and lateral rotation   MUSCLE SPASM: mild   PARASPINAL TENDERNESS: bilateral   FACET LOADING: bilateral left worse than right   SI JOINT TENDERNESS: neg  JI:  neg  STRAIGHT LEG RAISE: neg  CROSSED STRAIGHT LEG RAISE: neg  HIP INTERNAL ROM: normal  HIP EXTERNAL ROM: normal   GT TENDERNESS:          Data Review:     I have personally reviewed the referring provider's notes, labs, & imaging made available to me today.     RADIOLOGY STUDIES:  I have personally reviewed the pertinent images performed.     6/8/15 MRI lumbar spine w/o contrast:  L1-2: minimal broad based disc protrusion, moderate facet arthropathy  L2-3: mild broad based disc protrusion L > R, annular tear, moderate facet arthropathy, mild stenosis left lateral recess (with mild compression of left L3 root)  L3-4: mild broad based disc protrusion, mild/moderate facet arthropathy   L4-5: mild broad based disc protrusion, moderate/severe facet arthropathy, mild lateral recess stenosis (with mild compression of L5 nerve roots)  L5-S1: transitional vertebra. Mild broad based disc protrusion R > L. Moderate facet arthropathy. Mild/mod compression of right lateral recess with compression of right S1 root.  Chronic L1 fracture   Chronic healed T11 fracture     Results for orders placed or performed during the hospital encounter of 04/03/18   MRI Brain W WO Contrast    Narrative     MRI BRAIN WITH AND WITHOUT CONTRAST    CPT: 73489    Clinical data:  Neurological deficit.  Left hand paralysis.    Comparison: CT head 04/03/2018    Technique: Multiplanar, multisequence MRI of the brain was performed before and after administration of contrast.     Findings:     The diffusion weighted imaging is negative for acute or subacute ischemia.  Gray-white matter differentiation appears within normal limits.  No extra-axial fluid collection, hydrocephalus, mass effect, midline shift is identified.  There is no acute hemorrhage.  Visualized vascular flow voids appear intact.  Visualized orbits appear unremarkable.  Visualized paranasal sinuses and mastoid air cells appear clear.  No mass is identified.  No pathologic enhancement is  noted.  There is demonstrated a shallow sella turcica with minimal pituitary tissue identified.    Impression       1. No acute intracranial abnormality.  2. No pathologic enhancement.      Electronically signed by: NITHIN HOYT MD  Date:     04/03/18  Time:    19:40    CT Head Without Contrast    Narrative    CT scan of the head without contrast    Clinical history: Weakness in the fingers of the left hand    [id number]    Findings: Multiple computerized images of the head were obtained in axial projection using thin slices.  No intravenous contrast was administered.  The study was viewed at soft tissue and bone window settings.  Reconstructions were done in coronal and sagittal planes.    No focal lesions are identified.  There is no evidence of ventricular dilatation, midline shift, or hemorrhage.  No territorial infarct was visualized.  The gray-white junction is maintained throughout.  No extracerebral fluid collection was demonstrated.  The bony calvarium appears intact.  No sinus or mastoid abnormality was noted.  The sella is shallow and very small which is of uncertain significance.  This might be better evaluated with an MRI scan.    Impression     There are no acute findings.  Note is made of the sella being shallow and somewhat small which is of uncertain significance.  An MRI scan may be helpful in further evaluation of this area.      Electronically signed by: RUTHIE MO MD  Date:     04/03/18  Time:    16:23        Lab Results   Component Value Date     (L) 06/17/2018    K 3.6 06/17/2018    MG 1.7 04/03/2018    CL 96 06/17/2018    CO2 27 06/17/2018    BUN 11 06/17/2018    CREATININE 0.74 06/17/2018    GLU 71 06/17/2018    HGBA1C 4.9 04/04/2018    AST 86 (H) 06/17/2018    ALT 46 (H) 06/17/2018    ALBUMIN 3.3 (L) 06/17/2018    PROT 6.4 06/17/2018    BILITOT 0.3 06/17/2018       Lab Results   Component Value Date    WBC 5.49 06/17/2018    HGB 11.1 (L) 06/17/2018    HCT 33.1 (L) 06/17/2018     MCV 85 06/17/2018     06/17/2018       Lab Results   Component Value Date    TSH 0.918 04/03/2018       Assessment & Plan:       Problem List Items Addressed This Visit        Neuro    Spondylosis of lumbar region without myelopathy or radiculopathy - Primary    Overview     Axial back pain more so that radicular radiation down to knees. 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 Paraspinals tender. SLR negative. SI joint non tender. + facet loading bilaterally. Pain most likely facetogenic given this exam pattern and worse pain with extension / roation.    Reviewed the records from her previous pain physician, patient had never been offered LMBBs before. She is an excellent candidate for diagsnotic LMBBs which I will perform bilaterally at L2-3-4-5 with plan to move on to RFA if >50% pain relief achieved during diagnostic period    Trial duloxetine for pain          Relevant Medications    morphine (MS CONTIN) 15 MG 12 hr tablet (Start on 7/25/2018)    oxyCODONE-acetaminophen (PERCOCET) 7.5-325 mg per tablet (Start on 7/25/2018)    cyclobenzaprine (FLEXERIL) 10 MG tablet    DULoxetine (CYMBALTA) 20 MG capsule       Psychiatric    Chronically on opiate therapy    Overview     Patient would like me to take over her chronic pain management. Reviewed her previous pain physician's notes which show good relief and no signs of adverse behavior. She has been on the same regimen for chronic low back pain for years. Her UDS was consistent with prescribed substances. Opiod risk tool shows she is low risk with score 1. Her MME is low at 60 MME daily.     Sign chronic opiate contract  Continue MS contin 30mg daily  Continue Percocet 7.5mg/325mg QID prn          Relevant Medications    morphine (MS CONTIN) 15 MG 12 hr tablet (Start on 7/25/2018)     oxyCODONE-acetaminophen (PERCOCET) 7.5-325 mg per tablet (Start on 7/25/2018)       Endocrine    Panhypopituitarism    Overview     Since birth, on chronic hydrocortisone and thyroid replacement                   This patient has failed multiple conservative and non-invasive therapies. This procedure is medically necessary to improve the patient's quality of life through decreased pain and improved function. To date the patient has tried and failed at least 2 months of opioid medications, physical therapy, and NSAID therapy. After the procedure the plan is to continue home exercise regimen and rehabilitation as tolerated.     Discussed the risk, benefits, and alternatives with the patient. Patient wishes to proceed with scheduling of two diagnostic lumbar medial branch blocks at L2-3-4-5 on 8/9/18 and 8/16/18     The patient voiced understanding that the produce has risk including but not limited to coma, paralysis, myocardial infarction, infection, bleeding, and death.    TESTING:  -- request new Xr records from primary doctor     PREVENTION OF PAIN:   -- begin duloxetine 20mg in AM x 7 days then raise to 40mg daily   -- return to surgery center for two diagnostic lumbar medial branch blocks at L2-3-4-5 on 8/9/18 and 8/16/18 - no need to stop any medications     AS-NEEDED TREATMENT OF PAIN:  -- refill MS Contin 15mg daily #30 7/25/18 - 8/24/18  -- refill Percocet 7.5mg/325mg 4x per day #120    -- refill Flexeril 0mg twice a day     No Follow-up on file. after procedure     Kelly Prajapati MD

## 2018-08-09 NOTE — OP NOTE
PROCEDURE DATE: 8/9/2018    PROCEDURE:  Lumbar L2, 3, 4, 5 medial branch nerve block, bilateral     CPT:  21970 - 50   78080 - 50   45180 - 50     DIAGNOSIS:  Lumbar facet arthropathy, lumbar spondylosis     Post Op diagnosis: Same    PHYSICIAN: Kelly Prajapati MD    MEDICATIONS INJECTED: 0.25% bupivicaine, 1ml at each level    LOCAL ANESTHETIC USED: Lidocaine 2%      ANESTHESIA: midazolam 2mg, fentanyl 25mcg IV     ESTIMATED BLOOD LOSS:  <5cc    COMPLICATIONS:  None     TECHNIQUE: A time out was taken to identify the patient, procedure and side of the procedure. The patient was placed in a prone position, then prepped and draped in the usual sterile fashion using ChloraPrep and sterile towels.  The levels were determined under fluoroscopic guidance and then marked.  Local anesthetic was given by raising a wheal at the skin over each site and then infiltrated approximately 2cm deeper.  A 22-gauge 3.5 inch spinal needle was introduced and guided to the anatomic locations of the L2,3,4,5  medial branch nerves on the right and left sides using fluoroscopy. The above medication was then injected at each level. The patient tolerated the procedure well.     The patient was monitored after the procedure. The patient will be contacted in the next few days to determine extent of relief.  Patient was given post procedure and discharge instructions to follow at home.  The patient was discharged in a stable condition.

## 2018-08-09 NOTE — DISCHARGE INSTRUCTIONS
Home care instructions  Apply ice pack to the injection site for 20 minutes periods for the first 24 hrs for soreness/discomfort at injection site DO NOT USE HEAT FOR 24 HOURS  Keep site clean and dry for 24 hours  Do not drive until tomorrow  Take care when walking after a lumbar injection  Go home and resume normal activities that cause pain  Dr. Prajapati's nurse will call to see how you doing after procedure  Resume home medication as prescribed today  Resume Aspirin, Plavix, or Coumadin the day after the procedure unless otherwise instructed.    SEE IMMEDIATE MEDICAL HELP FOR:  Severe increase in your usual pain or appearance of new pain  Prolonged or increasing weakness or numbness in the legs or arms  Drainage, redness, active bleeding, or increased swelling at the injection site  Temperature over 100.0 degrees F.  Headache that increases when your head is upright and decreases when you lie flat    CALL 911 OR GO DIRECTLY TO EMERGENCY DEPARTMENT FOR:  Shortness of breath, chest pain, or problems breathing      Recovery After Procedural Sedation (Adult)  You have been given medicine by vein to make you sleep during your surgery. This may have included both a pain medicine and sleeping medicine. Most of the effects have worn off. But you may still have some drowsiness for the next 6 to 8 hours.  Home care  Follow these guidelines when you get home:  · For the next 8 hours, you should be watched by a responsible adult. This person should make sure your condition is not getting worse.  · Don't drink any alcohol for the next 24 hours.  · Don't drive, operate dangerous machinery, or make important business or personal decisions during the next 24 hours.  Note: Your healthcare provider may tell you not to take any medicine by mouth for pain or sleep in the next 4 hours. These medicines may react with the medicines you were given in the hospital. This could cause a much stronger response than usual.  Follow-up  care  Follow up with your healthcare provider if you are not alert and back to your usual level of activity within 12 hours.  When to seek medical advice  Call your healthcare provider right away if any of these occur:  · Drowsiness gets worse  · Weakness or dizziness gets worse  · Repeated vomiting  · You can't be awakened   Date Last Reviewed: 10/18/2016  © 0735-9276 [employer]. [address] [place] [address], [place] [address]. All rights reserved. This information is not intended as a substitute for professional medical care. Always follow your healthcare professional's instructions.

## 2018-08-10 ENCOUNTER — TELEPHONE (OUTPATIENT)
Dept: NEUROLOGY | Facility: CLINIC | Age: 43
End: 2018-08-10

## 2018-08-10 NOTE — TELEPHONE ENCOUNTER
Called and spoke with patient. Patient rated her relief at 50%. She said by 4 pm yesterday her pain was back to how it was before the block. Please advise.

## 2018-08-13 NOTE — TELEPHONE ENCOUNTER
Returned call. No answer. Left voicemail to call back.        Dr. Prajapati would like her to proceed with the second LMBB.

## 2018-08-16 ENCOUNTER — HOSPITAL ENCOUNTER (OUTPATIENT)
Dept: RADIOLOGY | Facility: HOSPITAL | Age: 43
Discharge: HOME | End: 2018-08-16
Attending: PSYCHIATRY & NEUROLOGY | Admitting: PSYCHIATRY & NEUROLOGY
Payer: MEDICARE

## 2018-08-16 ENCOUNTER — HOSPITAL ENCOUNTER (OUTPATIENT)
Facility: HOSPITAL | Age: 43
Discharge: HOME | End: 2018-08-16
Attending: PSYCHIATRY & NEUROLOGY | Admitting: PSYCHIATRY & NEUROLOGY
Payer: MEDICARE

## 2018-08-16 VITALS
RESPIRATION RATE: 16 BRPM | OXYGEN SATURATION: 96 % | SYSTOLIC BLOOD PRESSURE: 91 MMHG | DIASTOLIC BLOOD PRESSURE: 53 MMHG | BODY MASS INDEX: 22.68 KG/M2 | TEMPERATURE: 98 F | HEART RATE: 67 BPM | HEIGHT: 63 IN | WEIGHT: 128 LBS

## 2018-08-16 DIAGNOSIS — M47.816 SPONDYLOSIS OF LUMBAR REGION WITHOUT MYELOPATHY OR RADICULOPATHY: Primary | ICD-10-CM

## 2018-08-16 DIAGNOSIS — M47.816 LUMBAR FACET ARTHROPATHY: ICD-10-CM

## 2018-08-16 DIAGNOSIS — M51.36 DDD (DEGENERATIVE DISC DISEASE), LUMBAR: ICD-10-CM

## 2018-08-16 PROCEDURE — 76000 FLUOROSCOPY <1 HR PHYS/QHP: CPT | Mod: TC,PO

## 2018-08-16 PROCEDURE — 64495 INJ PARAVERT F JNT L/S 3 LEV: CPT | Mod: 50,PO | Performed by: PSYCHIATRY & NEUROLOGY

## 2018-08-16 PROCEDURE — 64495 INJ PARAVERT F JNT L/S 3 LEV: CPT | Mod: 50,,, | Performed by: PSYCHIATRY & NEUROLOGY

## 2018-08-16 PROCEDURE — 64493 INJ PARAVERT F JNT L/S 1 LEV: CPT | Mod: 50,,, | Performed by: PSYCHIATRY & NEUROLOGY

## 2018-08-16 PROCEDURE — 64493 INJ PARAVERT F JNT L/S 1 LEV: CPT | Mod: 50,PO | Performed by: PSYCHIATRY & NEUROLOGY

## 2018-08-16 PROCEDURE — 64494 INJ PARAVERT F JNT L/S 2 LEV: CPT | Mod: 50,PO | Performed by: PSYCHIATRY & NEUROLOGY

## 2018-08-16 PROCEDURE — 63600175 PHARM REV CODE 636 W HCPCS: Mod: PO | Performed by: PSYCHIATRY & NEUROLOGY

## 2018-08-16 PROCEDURE — 25000003 PHARM REV CODE 250: Mod: PO | Performed by: PSYCHIATRY & NEUROLOGY

## 2018-08-16 PROCEDURE — 64494 INJ PARAVERT F JNT L/S 2 LEV: CPT | Mod: 50,,, | Performed by: PSYCHIATRY & NEUROLOGY

## 2018-08-16 RX ORDER — FENTANYL CITRATE 50 UG/ML
INJECTION, SOLUTION INTRAMUSCULAR; INTRAVENOUS
Status: DISCONTINUED | OUTPATIENT
Start: 2018-08-16 | End: 2018-08-16 | Stop reason: HOSPADM

## 2018-08-16 RX ORDER — MIDAZOLAM HYDROCHLORIDE 2 MG/2ML
INJECTION, SOLUTION INTRAMUSCULAR; INTRAVENOUS
Status: DISCONTINUED | OUTPATIENT
Start: 2018-08-16 | End: 2018-08-16 | Stop reason: HOSPADM

## 2018-08-16 RX ORDER — LIDOCAINE HYDROCHLORIDE 10 MG/ML
INJECTION, SOLUTION EPIDURAL; INFILTRATION; INTRACAUDAL; PERINEURAL
Status: DISCONTINUED | OUTPATIENT
Start: 2018-08-16 | End: 2018-08-16 | Stop reason: HOSPADM

## 2018-08-16 RX ORDER — SODIUM CHLORIDE, SODIUM LACTATE, POTASSIUM CHLORIDE, CALCIUM CHLORIDE 600; 310; 30; 20 MG/100ML; MG/100ML; MG/100ML; MG/100ML
INJECTION, SOLUTION INTRAVENOUS CONTINUOUS
Status: DISCONTINUED | OUTPATIENT
Start: 2018-08-16 | End: 2018-08-16 | Stop reason: HOSPADM

## 2018-08-16 RX ORDER — LIDOCAINE HYDROCHLORIDE 10 MG/ML
1 INJECTION, SOLUTION EPIDURAL; INFILTRATION; INTRACAUDAL; PERINEURAL ONCE
Status: COMPLETED | OUTPATIENT
Start: 2018-08-16 | End: 2018-08-16

## 2018-08-16 RX ORDER — BUPIVACAINE HYDROCHLORIDE 2.5 MG/ML
INJECTION, SOLUTION EPIDURAL; INFILTRATION; INTRACAUDAL
Status: DISCONTINUED | OUTPATIENT
Start: 2018-08-16 | End: 2018-08-16 | Stop reason: HOSPADM

## 2018-08-16 RX ADMIN — LIDOCAINE HYDROCHLORIDE 10 MG: 10 INJECTION, SOLUTION EPIDURAL; INFILTRATION; INTRACAUDAL; PERINEURAL at 08:08

## 2018-08-16 RX ADMIN — SODIUM CHLORIDE, SODIUM LACTATE, POTASSIUM CHLORIDE, AND CALCIUM CHLORIDE: .6; .31; .03; .02 INJECTION, SOLUTION INTRAVENOUS at 08:08

## 2018-08-16 NOTE — DISCHARGE INSTRUCTIONS
Home care instructions  Apply ice pack to the injection site for 20 minutes periods for the first 24 hrs for soreness/discomfort at injection site DO NOT USE HEAT FOR 24 HOURS  Keep site clean and dry for 24 hours, remove bandaid when desired  Do not drive until tomorrow  Take care when walking after a lumbar injection  Do the normal things that cause pain for you. The drKaris Will call to assess how the block is working.  Resume home medication as prescribed today  Resume Aspirin, Plavix, or Coumadin the day after the procedure unless otherwise instructed.    SEE IMMEDIATE MEDICAL HELP FOR:  Severe increase in your usual pain or appearance of new pain  Prolonged or increasing weakness or numbness in the legs or arms  Drainage, redness, active bleeding, or increased swelling at the injection site  Temperature over 100.0 degrees F.  Headache that increases when your head is upright and decreases when you lie flat    CALL 911 OR GO DIRECTLY TO EMERGENCY DEPARTMENT FOR:  Shortness of breath, chest pain, or problems breathing      Recovery After Procedural Sedation (Adult)  You have been given medicine by vein to make you sleep during your surgery. This may have included both a pain medicine and sleeping medicine. Most of the effects have worn off. But you may still have some drowsiness for the next 6 to 8 hours.  Home care  Follow these guidelines when you get home:  · For the next 8 hours, you should be watched by a responsible adult. This person should make sure your condition is not getting worse.  · Don't drink any alcohol for the next 24 hours.  · Don't drive, operate dangerous machinery, or make important business or personal decisions during the next 24 hours.  Note: Your healthcare provider may tell you not to take any medicine by mouth for pain or sleep in the next 4 hours. These medicines may react with the medicines you were given in the hospital. This could cause a much stronger response than usual.  Follow-up  care  Follow up with your healthcare provider if you are not alert and back to your usual level of activity within 12 hours.  When to seek medical advice  Call your healthcare provider right away if any of these occur:  · Drowsiness gets worse  · Weakness or dizziness gets worse  · Repeated vomiting  · You can't be awakened   Date Last Reviewed: 10/18/2016  © 1535-4651 [employer]. [address] [place] [address], [place] [address]. All rights reserved. This information is not intended as a substitute for professional medical care. Always follow your healthcare professional's instructions.

## 2018-08-16 NOTE — DISCHARGE SUMMARY
[facility]  Discharge Note  Short Stay    Admit Date: 8/16/2018    Discharge Date: 8/16/2018    Attending Physician: Kelly Prajapati MD     Discharge Provider: Kelly Prajapati    Diagnoses:  Active Hospital Problems    Diagnosis  POA    *Spondylosis of lumbar region without myelopathy or radiculopathy [M47.816]  Yes     Priority: 1 - High     Axial back pain more so that radicular radiation down to knees. 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 Paraspinals tender. SLR negative. SI joint non tender. + facet loading bilaterally. Pain most likely facetogenic given this exam pattern and worse pain with extension / roation.    Reviewed the records from her previous pain physician, patient had never been offered LMBBs before. She is an excellent candidate for diagsnotic LMBBs which I will perform bilaterally at L2-3-4-5 with plan to move on to RFA if >50% pain relief achieved during diagnostic period    Trial duloxetine for pain         Resolved Hospital Problems   No resolved problems to display.       Discharged Condition: good    Final Diagnoses: Lumbar facet arthropathy [M46.96]    Disposition: Home or Self Care    Hospital Course: no complications, uneventful    Outcome of Hospitalization, Treatment, Procedure, or Surgery:  Patient was admitted for outpatient procedure. The patient underwent procedure without complications and are discharged home    Follow up/Patient Instructions:  Follow up as scheduled/Patient has received instructions and follow up date    Medications:  Continue previous medications    Discharge Procedure Orders   Notify your health care provider if you experience any of the following:  temperature >100.4     Notify your health care provider if you experience any of the following:  persistent nausea and vomiting or diarrhea      Notify your health care provider if you experience any of the following:  severe uncontrolled pain     Notify your health care provider if you experience any of the following:  redness, tenderness, or signs of infection (pain, swelling, redness, odor or green/yellow discharge around incision site)     Notify your health care provider if you experience any of the following:  difficulty breathing or increased cough     Notify your health care provider if you experience any of the following:  severe persistent headache     Notify your health care provider if you experience any of the following:  worsening rash     Notify your health care provider if you experience any of the following:  persistent dizziness, light-headedness, or visual disturbances     Notify your health care provider if you experience any of the following:  increased confusion or weakness     No dressing needed         Discharge Procedure Orders (must include Diet, Follow-up, Activity):   Discharge Procedure Orders (must include Diet, Follow-up, Activity)   Notify your health care provider if you experience any of the following:  temperature >100.4     Notify your health care provider if you experience any of the following:  persistent nausea and vomiting or diarrhea     Notify your health care provider if you experience any of the following:  severe uncontrolled pain     Notify your health care provider if you experience any of the following:  redness, tenderness, or signs of infection (pain, swelling, redness, odor or green/yellow discharge around incision site)     Notify your health care provider if you experience any of the following:  difficulty breathing or increased cough     Notify your health care provider if you experience any of the following:  severe persistent headache     Notify your health care provider if you experience any of the following:  worsening rash     Notify your health care provider if you experience any of the  following:  persistent dizziness, light-headedness, or visual disturbances     Notify your health care provider if you experience any of the following:  increased confusion or weakness     No dressing needed

## 2018-08-16 NOTE — OP NOTE
PROCEDURE DATE: 8/16/2018    PROCEDURE:  Lumbar L2, 3, 4, 5 medial branch nerve block, bilateral     CPT:  35221 - 50   65061 - 50   46288 - 50     DIAGNOSIS:  Lumbar facet arthropathy, lumbar spondylosis     Post Op diagnosis: Same    PHYSICIAN: Kelly Prajapati MD    MEDICATIONS INJECTED: 0.25% bupivicaine, 1ml at each level    LOCAL ANESTHETIC USED: Lidocaine 2%      ANESTHESIA: midazolam 2mg, fentanyl 25mcg IV     ESTIMATED BLOOD LOSS:  <5cc    COMPLICATIONS:  None     TECHNIQUE: A time out was taken to identify the patient, procedure and side of the procedure. The patient was placed in a prone position, then prepped and draped in the usual sterile fashion using ChloraPrep and sterile towels.  The levels were determined under fluoroscopic guidance and then marked.  Local anesthetic was given by raising a wheal at the skin over each site and then infiltrated approximately 2cm deeper.  A 22-gauge 3.5 inch spinal needle was introduced and guided to the anatomic locations of the L2,3,4,5  medial branch nerves on the right and left sides using fluoroscopy. The above medication was then injected at each level. The patient tolerated the procedure well.     The patient was monitored after the procedure. The patient will be contacted in the next few days to determine extent of relief.  Patient was given post procedure and discharge instructions to follow at home.  The patient was discharged in a stable condition.

## 2018-08-17 ENCOUNTER — OFFICE VISIT (OUTPATIENT)
Dept: NEUROLOGY | Facility: CLINIC | Age: 43
End: 2018-08-17
Payer: MEDICARE

## 2018-08-17 VITALS
HEIGHT: 63 IN | DIASTOLIC BLOOD PRESSURE: 58 MMHG | SYSTOLIC BLOOD PRESSURE: 100 MMHG | WEIGHT: 126.13 LBS | BODY MASS INDEX: 22.35 KG/M2

## 2018-08-17 DIAGNOSIS — M54.50 CHRONIC BILATERAL LOW BACK PAIN WITHOUT SCIATICA: ICD-10-CM

## 2018-08-17 DIAGNOSIS — E23.0 PANHYPOPITUITARISM: ICD-10-CM

## 2018-08-17 DIAGNOSIS — S22.000A COMPRESSION FRACTURE OF BODY OF THORACIC VERTEBRA: ICD-10-CM

## 2018-08-17 DIAGNOSIS — M47.816 SPONDYLOSIS OF LUMBAR REGION WITHOUT MYELOPATHY OR RADICULOPATHY: Primary | ICD-10-CM

## 2018-08-17 DIAGNOSIS — G89.29 CHRONIC BILATERAL LOW BACK PAIN WITHOUT SCIATICA: ICD-10-CM

## 2018-08-17 DIAGNOSIS — Z79.891 CHRONICALLY ON OPIATE THERAPY: ICD-10-CM

## 2018-08-17 PROCEDURE — 3008F BODY MASS INDEX DOCD: CPT | Mod: S$GLB,,, | Performed by: PSYCHIATRY & NEUROLOGY

## 2018-08-17 PROCEDURE — 99999 PR PBB SHADOW E&M-EST. PATIENT-LVL IV: CPT | Mod: PBBFAC,,, | Performed by: PSYCHIATRY & NEUROLOGY

## 2018-08-17 PROCEDURE — 99214 OFFICE O/P EST MOD 30 MIN: CPT | Mod: S$GLB,,, | Performed by: PSYCHIATRY & NEUROLOGY

## 2018-08-17 RX ORDER — OXYCODONE AND ACETAMINOPHEN 7.5; 325 MG/1; MG/1
1 TABLET ORAL 4 TIMES DAILY PRN
Qty: 120 TABLET | Refills: 0 | Status: SHIPPED | OUTPATIENT
Start: 2018-08-24 | End: 2018-09-21 | Stop reason: SDUPTHER

## 2018-08-17 RX ORDER — MORPHINE SULFATE 15 MG/1
15 TABLET, FILM COATED, EXTENDED RELEASE ORAL DAILY
Qty: 30 TABLET | Refills: 0 | Status: SHIPPED | OUTPATIENT
Start: 2018-08-24 | End: 2018-09-21 | Stop reason: SDUPTHER

## 2018-08-17 NOTE — H&P (VIEW-ONLY)
Date of service: 8/17/2018  Referring provider: No ref. provider found    Subjective:      Chief complaint: Migraine       Patient ID: Radha Ramirez is a 42 y.o. 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 During that time she was able to stand up and cook herself a meal which she is normally unable to do. She was less sedentary and able to stand more than normal and was able to grocery shop.  She would like to proceed with RFA of these nerves.    In the interim I received imaging records from [facility] specifically her MRI thoracic spine without contrast which shows that she has a severe T9 compression fracture of indeterminate age but we do know it is new since 04/13/2018 and was already seen on x-ray on 06/16/2018.  She does have mid back pain but the most severe of her pains in the lower spine.  She also has the chronic severe L1 compression fracture.    She continues on her chronic opiate regimen with good relief no adverse effects and no Advair behaviors.  If her current pain score 5 with a range of 3-10.    INTERVAL HISTORY - 7/20/18    UDS done last visit 6/18/18 concordant with prescribed morphine, oxycodone and fentanyl given in ED day before    Today she reports she is about the same. Pain is in the middle and lower back, currently 6/10, range 4 to 10.     She reports she had new Xrays done showing new compression fractures with her PCP. She is pending an MRI. She will also be getting a bone density scan.    Current back pain is 6/10 with range of 4 to 10.     INTERVAL HISTORY - 6/18/18     The below problem (posterior interosseus neuropathy) has  completely resolved and did so really a week later after onset. She has had no new weakness occur.     She currently follows for chronic pain management with neurologist Dr. Hunter Gayle in [facility] but requests to change to me to have closer to home provider. Her main pain is axial back pain with radiation down the legs to the level of the knees. Her current pain score is 8. She last filled her medications on 5/26. Her stable regimen is listed below. Her mother expresses concern as to whether anything else can be done to minimize her medications. She reports she cannot get ESIs due to her chronic steroid use. She reports she has never received lumbar facet blocks / RFAs.    Her pain is currently flared because she recently became dizzy in the setting of UTI, poor PO intake, and fell, on her sacrum. Was evaluated in the ED. XR showed chronic L1 fracture, age indeterminate but new since 4/3/18 T9 fracture, and possible fracture at the sacrococcygeal junction. She was discharged after having been given toradol and percocet in the ED.     ORIGINAL NEURO HISTORY - 4/5/18   Patient woke up yesterday 6am to use restroom, noticed left hand was weak. Went back to sleep woke up at 1030 am, still weak. Reports she cannot extend the fingers. Wrsit ok. NO PAIN. There is no numbness or tingling at all. She slept on her back, with the left wrist extended and under her chin, but was never on her humerus. No previous episodes.      Incidentally found to have optic nerve thinning by eye doctor - undergoing visual fields for further diagnosis.      Takes MS contin, percocet, benadryl, flexeril before bed but all doses were typical. No alcohol before bed.     HOSPITAL COURSE:  4/5/18: no clinical changes. OT recommended outpatient therapy. MRI cervical done and normal. No pain.      #1 Left hand weakness - occurring after waking up after sleeping with left wrist extended supporting the chin. No numbness at all and no pain. Because  this initially appeared to me to be involvement of 3 separate peripheral nerves (median, ulnar, radial) or C7-T1 roots and patient has bilateral babinski I recommend cervical spine imaging with contrast which was completely normal. 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 This single nerve explains all her motor weakness and as it is a pure motor nerve explains why she has no sensory loss. Her inflammatory markers are elevated but the sudden onset and complete lack of pain argue against a mononeuritis multilpex or an early parsonage reina as the diagnosis. Thus, I do think this is compressive, from a position she sustained while sleeping. I expect this to resolve or markedly improve within 3 months.     Current acute treatment:  -- MS Contin 15mg nightly  -- Percocet 7.5mg/325mg QID     Current prevention:  -- duloxetine 40 mg - tolerating well     Previously tried:   -- none    Procedural history:   -- bilateral lumbar medial branch block at L2-3-4-5 on 08/09/2018 and 08/16/2018 with 50% pain relief for approximately 12 hours after both blocks.      Review of patient's allergies indicates:  No Known Allergies  Current Outpatient Medications   Medication Sig Dispense Refill    calcitRIOL (ROCALTROL) 0.5 MCG Cap Take 0.5 mcg by mouth every Mon, Wed, Fri.      cyclobenzaprine (FLEXERIL) 10 MG tablet Take 1 tablet (10 mg total) by mouth 2 (two) times daily. 60 tablet 11    DULoxetine (CYMBALTA) 20 MG capsule Take 2 capsules (40 mg total) by mouth once daily. 60 capsule 11    ergocalciferol (VITAMIN D2) 50,000 unit Cap  "Take 50,000 Units by mouth every 7 days.      hydrocortisone (CORTEF) 20 MG Tab Take 10 mg by mouth once daily.      levothyroxine (SYNTHROID, LEVOTHROID) 175 MCG tablet Take 175 mcg by mouth once daily.      liothyronine (CYTOMEL) 5 MCG Tab Take 25 mcg by mouth once daily.      [START ON 8/24/2018] morphine (MS CONTIN) 15 MG 12 hr tablet Take 1 tablet (15 mg total) by mouth once daily. 30 tablet 0    [START ON 8/24/2018] oxyCODONE-acetaminophen (PERCOCET) 7.5-325 mg per tablet Take 1 tablet by mouth 4 (four) times daily as needed for Pain. 120 tablet 0    promethazine (PHENERGAN) 25 MG tablet Take 25 mg by mouth every 4 (four) hours.       No current facility-administered medications for this visit.        Past Medical History  Past Medical History:   Diagnosis Date    Adrenal insufficiency     Arthritis     "Bulging and herniated lumbar discs"     Chronic instability of knee     Humerus fracture     Panhypopituitarism     Pelvic fracture     Rib fracture     Thyroid disease        Past Surgical History  Past Surgical History:   Procedure Laterality Date    GASTRIC BYPASS  2003       Family History  History reviewed. No pertinent family history.    Social History  Social History     Socioeconomic History    Marital status: Single     Spouse name: Not on file    Number of children: Not on file    Years of education: Not on file    Highest education level: Not on file   Social Needs    Financial resource strain: Not on file    Food insecurity - worry: Not on file    Food insecurity - inability: Not on file    Transportation needs - medical: Not on file    Transportation needs - non-medical: Not on file   Occupational History    Not on file   Tobacco Use    Smoking status: Never Smoker    Smokeless tobacco: Never Used   Substance and Sexual Activity    Alcohol use: No    Drug use: No    Sexual activity: Not on file   Other Topics Concern    Not on file   Social History Narrative    Not " on file        Review of Systems  14-point review of systems as follows:   No check adore indicates NEGATIVE response   Constitutional: [] weight loss, [] change to appetite   Eyes: [] change in vision, [] double vision   Ears, nose, mouth, throat: [] frequent nose bleeds, [] ringing in the ears   Respiratory: [] cough, [] wheezing   Cardiovascular: [] chest pain, [] palpitations   Gastrointestinal: [] jaundice, [] nausea/vomiting   Genitourinary: [] incontinence, [] burning with urination   Hematologic/lymphatic: [x] easy bruising/bleeding, [] night sweats   Neurological: [] numbness, [] weakness   Endocrine: [] fatigue, [] heat/cold intolerance   Allergy/Immunologic: [] fevers, [] chills   Musculoskeletal: [] muscle pain, [x] joint pain   Psychiatric: [] thoughts of harming self/others, [] depression   Integumentary: [] rashes, [] sores that do not heal     Objective:        Vitals:    08/17/18 1609   BP: (!) 100/58     Body mass index is 22.34 kg/m².    General: Well developed, well nourished.  No acute distress.   HEENT: Atraumatic, normocephalic. Poor dentition.   Neck: Trachea midline  Cardiovascular: Vitals reviewed. Normal peripheral perfusion.   Pulmonary: No increased work of breathing.  Abdomen/GI: No guarding  Musculoskeletal: No obvious joint deformities, moves all extremities symmetrically and well.     LUMBAR SPINE:  INSPECTION: no lesions   ROM: normal flexion but limited extension and lateral rotation   MUSCLE SPASM: mild   PARASPINAL TENDERNESS: bilateral   FACET LOADING: bilateral left worse than right   SI JOINT TENDERNESS: neg  JI: neg  STRAIGHT LEG RAISE: neg  CROSSED STRAIGHT LEG RAISE: neg  HIP INTERNAL ROM: normal  HIP EXTERNAL ROM: normal   GT TENDERNESS:       Data Review:     I have personally reviewed the referring provider's notes, labs, & imaging made available to me today.     RADIOLOGY STUDIES:  I have personally reviewed the pertinent images performed.     7/24/18 MRI thoracic  spine without contrast  1.  Severe T9 compression fracture of indeterminate age which is at least partially subacute with minimal bone edema.  2.  Severe L1 compression fracture which appears chronic, with no associated bone marrow edema.  3.  Chronic T11 Schmorl's with no associated bone marrow edema.  4.  Mild multilevel degenerative disc disease  5.  There is no spinal or foraminal stenosis appreciated  6.  Cholelithiasis    07/24/2018 MRI lumbar spine without contrast  1.  Severe chronic L1 compression fracture with no bone marrow edema appreciated  2.  Mild degenerative disc disease and mild to moderate facet arthropathy predominantly lower spine    08/01/2018 NM HIDA scan  1.  No evidence of cystic or common duct obstruction  2.  Normal gallbladder function    6/8/15 MRI lumbar spine w/o contrast:  L1-2: minimal broad based disc protrusion, moderate facet arthropathy  L2-3: mild broad based disc protrusion L > R, annular tear, moderate facet arthropathy, mild stenosis left lateral recess (with mild compression of left L3 root)  L3-4: mild broad based disc protrusion, mild/moderate facet arthropathy   L4-5: mild broad based disc protrusion, moderate/severe facet arthropathy, mild lateral recess stenosis (with mild compression of L5 nerve roots)  L5-S1: transitional vertebra. Mild broad based disc protrusion R > L. Moderate facet arthropathy. Mild/mod compression of right lateral recess with compression of right S1 root.  Chronic L1 fracture   Chronic healed T11 fracture     Results for orders placed or performed during the hospital encounter of 04/03/18   MRI Brain W WO Contrast    Narrative     MRI BRAIN WITH AND WITHOUT CONTRAST    CPT: 97335    Clinical data:  Neurological deficit.  Left hand paralysis.    Comparison: CT head 04/03/2018    Technique: Multiplanar, multisequence MRI of the brain was performed before and after administration of contrast.     Findings:     The diffusion weighted imaging is  negative for acute or subacute ischemia.  Gray-white matter differentiation appears within normal limits.  No extra-axial fluid collection, hydrocephalus, mass effect, midline shift is identified.  There is no acute hemorrhage.  Visualized vascular flow voids appear intact.  Visualized orbits appear unremarkable.  Visualized paranasal sinuses and mastoid air cells appear clear.  No mass is identified.  No pathologic enhancement is noted.  There is demonstrated a shallow sella turcica with minimal pituitary tissue identified.    Impression       1. No acute intracranial abnormality.  2. No pathologic enhancement.      Electronically signed by: NITHIN HOYT MD  Date:     04/03/18  Time:    19:40    CT Head Without Contrast    Narrative    CT scan of the head without contrast    Clinical history: Weakness in the fingers of the left hand    [id number]    Findings: Multiple computerized images of the head were obtained in axial projection using thin slices.  No intravenous contrast was administered.  The study was viewed at soft tissue and bone window settings.  Reconstructions were done in coronal and sagittal planes.    No focal lesions are identified.  There is no evidence of ventricular dilatation, midline shift, or hemorrhage.  No territorial infarct was visualized.  The gray-white junction is maintained throughout.  No extracerebral fluid collection was demonstrated.  The bony calvarium appears intact.  No sinus or mastoid abnormality was noted.  The sella is shallow and very small which is of uncertain significance.  This might be better evaluated with an MRI scan.    Impression     There are no acute findings.  Note is made of the sella being shallow and somewhat small which is of uncertain significance.  An MRI scan may be helpful in further evaluation of this area.      Electronically signed by: RUTHIE MO MD  Date:     04/03/18  Time:    16:23        Lab Results   Component Value Date     (L)  06/17/2018    K 3.6 06/17/2018    MG 1.7 04/03/2018    CL 96 06/17/2018    CO2 27 06/17/2018    BUN 11 06/17/2018    CREATININE 0.74 06/17/2018    GLU 71 06/17/2018    HGBA1C 4.9 04/04/2018    AST 86 (H) 06/17/2018    ALT 46 (H) 06/17/2018    ALBUMIN 3.3 (L) 06/17/2018    PROT 6.4 06/17/2018    BILITOT 0.3 06/17/2018       Lab Results   Component Value Date    WBC 5.49 06/17/2018    HGB 11.1 (L) 06/17/2018    HCT 33.1 (L) 06/17/2018    MCV 85 06/17/2018     06/17/2018       Lab Results   Component Value Date    TSH 0.918 04/03/2018       Assessment & Plan:       Problem List Items Addressed This Visit        Neuro    Spondylosis of lumbar region without myelopathy or radiculopathy - Primary    Overview     Axial back pain more so that radicular radiation down to knees. 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 Paraspinals tender. SLR negative. SI joint non tender. + facet loading bilaterally. Pain most likely facetogenic given this exam pattern and worse pain with extension / roation.    Reviewed the records from her previous pain physician, patient had never been offered LMBBs before. She is bilateral lumbar medial branch block at L2-3-4-5 on 08/09/2018 and 08/16/2018 with 50% pain relief for approximately 12 hours after both blocks.  Proceed with RFA of the same nerves as below.    Continue duloxetine for chronic musculoskeletal pain.         Relevant Medications    oxyCODONE-acetaminophen (PERCOCET) 7.5-325 mg per tablet (Start on 8/24/2018)    morphine (MS CONTIN) 15 MG 12 hr tablet (Start on 8/24/2018)    Compression fracture of body of thoracic vertebra    Overview     Patient has known chronic T11 and L1 fractures but has a more recent T9 fracture sustained after April 2018.  She does have more upper back pain than previous and I am  concerned this may be coming from the new T9 fracture so for this reason I will refer her to Neurosurgery for consideration of vertebroplasty or kyphoplasty.         Relevant Orders    Ambulatory referral to Neurosurgery       Psychiatric    Chronically on opiate therapy    Overview     Patient would like me to take over her chronic pain management. Reviewed her previous pain physician's notes which show good relief and no signs of adverse behavior. She has been on the same regimen for chronic low back pain for years. Her UDS was consistent with prescribed substances. Opiod risk tool shows she is low risk with score 1. Her MME is low at 60 MME daily.     Sign chronic opiate contract  Continue MS contin 30mg daily  Continue Percocet 7.5mg/325mg QID prn          Relevant Medications    oxyCODONE-acetaminophen (PERCOCET) 7.5-325 mg per tablet (Start on 8/24/2018)    morphine (MS CONTIN) 15 MG 12 hr tablet (Start on 8/24/2018)       Endocrine    Panhypopituitarism    Overview     Since birth, on chronic hydrocortisone and thyroid replacement            Orthopedic    Chronic bilateral low back pain without sciatica    Overview     Combination of degenerative disc disease facet arthropathy chronic L1 compression fracture.         Relevant Medications    oxyCODONE-acetaminophen (PERCOCET) 7.5-325 mg per tablet (Start on 8/24/2018)    morphine (MS CONTIN) 15 MG 12 hr tablet (Start on 8/24/2018)              This patient has failed multiple conservative and non-invasive therapies. This procedure is medically necessary to improve the patient's quality of life through decreased pain and improved function. To date the patient has tried and failed at least 2 months of opioid medications, physical therapy, and NSAID therapy. After the procedure the plan is to continue home exercise regimen and rehabilitation as tolerated.     Discussed the risk, benefits, and alternatives with the patient. Patient wishes to proceed with  scheduling of lumbar medial branch nerve RFA at L2-3-4-5 on 8/30/18 (left) then 9/6/18 (right).     The patient voiced understanding that the produce has risk including but not limited to coma, paralysis, myocardial infarction, infection, bleeding, and death.    TESTING:  -- please obtain a disc of your most recent thoracic and lumbar MRIs in preparation for your neurosurgery consult     REFERRAL:  -- refer to neurosurgery for evaluation of T9 compression fracture     PREVENTION OF PAIN:   -- continue duloxetine 40mg daily   -- return to surgery center for lumbar medial branch nerve RFA at L2-3-4-5 on 8/30/18 (left) then 9/6/18 (right).     AS-NEEDED TREATMENT OF PAIN:  -- refill MS Contin 15mg daily #30 8/24/18 - 9/23/18   -- refill Percocet 7.5mg/325mg 4x per day #120  8/24/18 - 9/23/18   -- refill Flexeril 0mg twice a day     Follow-up in about 5 weeks (around 9/21/2018). after procedure     Kelly Prajapati MD

## 2018-08-17 NOTE — PATIENT INSTRUCTIONS
TESTING:  -- please obtain a disc of your most recent thoracic and lumbar MRIs in preparation for your neurosurgery consult     REFERRAL:  -- refer to neurosurgery for evaluation of T9 compression fracture     PREVENTION OF PAIN:   -- continue duloxetine 40mg daily   -- return to surgery center for lumbar medial branch nerve RFA at L2-3-4-5 on 8/30/18 (left) then 9/6/18 (right).     AS-NEEDED TREATMENT OF PAIN:  -- refill MS Contin 15mg daily #30 8/24/18 - 9/23/18   -- refill Percocet 7.5mg/325mg 4x per day #120  8/24/18 - 9/23/18   -- refill Flexeril 0mg twice a day

## 2018-08-17 NOTE — H&P (VIEW-ONLY)
Date of service: 8/17/2018  Referring provider: No ref. provider found    Subjective:      Chief complaint: Migraine       Patient ID: Radha Ramirez is a 42 y.o. 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 During that time she was able to stand up and cook herself a meal which she is normally unable to do. She was less sedentary and able to stand more than normal and was able to grocery shop.  She would like to proceed with RFA of these nerves.    In the interim I received imaging records from [facility] specifically her MRI thoracic spine without contrast which shows that she has a severe T9 compression fracture of indeterminate age but we do know it is new since 04/13/2018 and was already seen on x-ray on 06/16/2018.  She does have mid back pain but the most severe of her pains in the lower spine.  She also has the chronic severe L1 compression fracture.    She continues on her chronic opiate regimen with good relief no adverse effects and no Advair behaviors.  If her current pain score 5 with a range of 3-10.    INTERVAL HISTORY - 7/20/18    UDS done last visit 6/18/18 concordant with prescribed morphine, oxycodone and fentanyl given in ED day before    Today she reports she is about the same. Pain is in the middle and lower back, currently 6/10, range 4 to 10.     She reports she had new Xrays done showing new compression fractures with her PCP. She is pending an MRI. She will also be getting a bone density scan.    Current back pain is 6/10 with range of 4 to 10.     INTERVAL HISTORY - 6/18/18     The below problem (posterior interosseus neuropathy) has  completely resolved and did so really a week later after onset. She has had no new weakness occur.     She currently follows for chronic pain management with neurologist Dr. Hunter Gayle in [facility] but requests to change to me to have closer to home provider. Her main pain is axial back pain with radiation down the legs to the level of the knees. Her current pain score is 8. She last filled her medications on 5/26. Her stable regimen is listed below. Her mother expresses concern as to whether anything else can be done to minimize her medications. She reports she cannot get ESIs due to her chronic steroid use. She reports she has never received lumbar facet blocks / RFAs.    Her pain is currently flared because she recently became dizzy in the setting of UTI, poor PO intake, and fell, on her sacrum. Was evaluated in the ED. XR showed chronic L1 fracture, age indeterminate but new since 4/3/18 T9 fracture, and possible fracture at the sacrococcygeal junction. She was discharged after having been given toradol and percocet in the ED.     ORIGINAL NEURO HISTORY - 4/5/18   Patient woke up yesterday 6am to use restroom, noticed left hand was weak. Went back to sleep woke up at 1030 am, still weak. Reports she cannot extend the fingers. Wrsit ok. NO PAIN. There is no numbness or tingling at all. She slept on her back, with the left wrist extended and under her chin, but was never on her humerus. No previous episodes.      Incidentally found to have optic nerve thinning by eye doctor - undergoing visual fields for further diagnosis.      Takes MS contin, percocet, benadryl, flexeril before bed but all doses were typical. No alcohol before bed.     HOSPITAL COURSE:  4/5/18: no clinical changes. OT recommended outpatient therapy. MRI cervical done and normal. No pain.      #1 Left hand weakness - occurring after waking up after sleeping with left wrist extended supporting the chin. No numbness at all and no pain. Because  this initially appeared to me to be involvement of 3 separate peripheral nerves (median, ulnar, radial) or C7-T1 roots and patient has bilateral babinski I recommend cervical spine imaging with contrast which was completely normal. 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 This single nerve explains all her motor weakness and as it is a pure motor nerve explains why she has no sensory loss. Her inflammatory markers are elevated but the sudden onset and complete lack of pain argue against a mononeuritis multilpex or an early parsonage reina as the diagnosis. Thus, I do think this is compressive, from a position she sustained while sleeping. I expect this to resolve or markedly improve within 3 months.     Current acute treatment:  -- MS Contin 15mg nightly  -- Percocet 7.5mg/325mg QID     Current prevention:  -- duloxetine 40 mg - tolerating well     Previously tried:   -- none    Procedural history:   -- bilateral lumbar medial branch block at L2-3-4-5 on 08/09/2018 and 08/16/2018 with 50% pain relief for approximately 12 hours after both blocks.      Review of patient's allergies indicates:  No Known Allergies  Current Outpatient Medications   Medication Sig Dispense Refill    calcitRIOL (ROCALTROL) 0.5 MCG Cap Take 0.5 mcg by mouth every Mon, Wed, Fri.      cyclobenzaprine (FLEXERIL) 10 MG tablet Take 1 tablet (10 mg total) by mouth 2 (two) times daily. 60 tablet 11    DULoxetine (CYMBALTA) 20 MG capsule Take 2 capsules (40 mg total) by mouth once daily. 60 capsule 11    ergocalciferol (VITAMIN D2) 50,000 unit Cap  "Take 50,000 Units by mouth every 7 days.      hydrocortisone (CORTEF) 20 MG Tab Take 10 mg by mouth once daily.      levothyroxine (SYNTHROID, LEVOTHROID) 175 MCG tablet Take 175 mcg by mouth once daily.      liothyronine (CYTOMEL) 5 MCG Tab Take 25 mcg by mouth once daily.      [START ON 8/24/2018] morphine (MS CONTIN) 15 MG 12 hr tablet Take 1 tablet (15 mg total) by mouth once daily. 30 tablet 0    [START ON 8/24/2018] oxyCODONE-acetaminophen (PERCOCET) 7.5-325 mg per tablet Take 1 tablet by mouth 4 (four) times daily as needed for Pain. 120 tablet 0    promethazine (PHENERGAN) 25 MG tablet Take 25 mg by mouth every 4 (four) hours.       No current facility-administered medications for this visit.        Past Medical History  Past Medical History:   Diagnosis Date    Adrenal insufficiency     Arthritis     "Bulging and herniated lumbar discs"     Chronic instability of knee     Humerus fracture     Panhypopituitarism     Pelvic fracture     Rib fracture     Thyroid disease        Past Surgical History  Past Surgical History:   Procedure Laterality Date    GASTRIC BYPASS  2003       Family History  History reviewed. No pertinent family history.    Social History  Social History     Socioeconomic History    Marital status: Single     Spouse name: Not on file    Number of children: Not on file    Years of education: Not on file    Highest education level: Not on file   Social Needs    Financial resource strain: Not on file    Food insecurity - worry: Not on file    Food insecurity - inability: Not on file    Transportation needs - medical: Not on file    Transportation needs - non-medical: Not on file   Occupational History    Not on file   Tobacco Use    Smoking status: Never Smoker    Smokeless tobacco: Never Used   Substance and Sexual Activity    Alcohol use: No    Drug use: No    Sexual activity: Not on file   Other Topics Concern    Not on file   Social History Narrative    Not " on file        Review of Systems  14-point review of systems as follows:   No check adore indicates NEGATIVE response   Constitutional: [] weight loss, [] change to appetite   Eyes: [] change in vision, [] double vision   Ears, nose, mouth, throat: [] frequent nose bleeds, [] ringing in the ears   Respiratory: [] cough, [] wheezing   Cardiovascular: [] chest pain, [] palpitations   Gastrointestinal: [] jaundice, [] nausea/vomiting   Genitourinary: [] incontinence, [] burning with urination   Hematologic/lymphatic: [x] easy bruising/bleeding, [] night sweats   Neurological: [] numbness, [] weakness   Endocrine: [] fatigue, [] heat/cold intolerance   Allergy/Immunologic: [] fevers, [] chills   Musculoskeletal: [] muscle pain, [x] joint pain   Psychiatric: [] thoughts of harming self/others, [] depression   Integumentary: [] rashes, [] sores that do not heal     Objective:        Vitals:    08/17/18 1609   BP: (!) 100/58     Body mass index is 22.34 kg/m².    General: Well developed, well nourished.  No acute distress.   HEENT: Atraumatic, normocephalic. Poor dentition.   Neck: Trachea midline  Cardiovascular: Vitals reviewed. Normal peripheral perfusion.   Pulmonary: No increased work of breathing.  Abdomen/GI: No guarding  Musculoskeletal: No obvious joint deformities, moves all extremities symmetrically and well.     LUMBAR SPINE:  INSPECTION: no lesions   ROM: normal flexion but limited extension and lateral rotation   MUSCLE SPASM: mild   PARASPINAL TENDERNESS: bilateral   FACET LOADING: bilateral left worse than right   SI JOINT TENDERNESS: neg  JI: neg  STRAIGHT LEG RAISE: neg  CROSSED STRAIGHT LEG RAISE: neg  HIP INTERNAL ROM: normal  HIP EXTERNAL ROM: normal   GT TENDERNESS:       Data Review:     I have personally reviewed the referring provider's notes, labs, & imaging made available to me today.     RADIOLOGY STUDIES:  I have personally reviewed the pertinent images performed.     7/24/18 MRI thoracic  spine without contrast  1.  Severe T9 compression fracture of indeterminate age which is at least partially subacute with minimal bone edema.  2.  Severe L1 compression fracture which appears chronic, with no associated bone marrow edema.  3.  Chronic T11 Schmorl's with no associated bone marrow edema.  4.  Mild multilevel degenerative disc disease  5.  There is no spinal or foraminal stenosis appreciated  6.  Cholelithiasis    07/24/2018 MRI lumbar spine without contrast  1.  Severe chronic L1 compression fracture with no bone marrow edema appreciated  2.  Mild degenerative disc disease and mild to moderate facet arthropathy predominantly lower spine    08/01/2018 NM HIDA scan  1.  No evidence of cystic or common duct obstruction  2.  Normal gallbladder function    6/8/15 MRI lumbar spine w/o contrast:  L1-2: minimal broad based disc protrusion, moderate facet arthropathy  L2-3: mild broad based disc protrusion L > R, annular tear, moderate facet arthropathy, mild stenosis left lateral recess (with mild compression of left L3 root)  L3-4: mild broad based disc protrusion, mild/moderate facet arthropathy   L4-5: mild broad based disc protrusion, moderate/severe facet arthropathy, mild lateral recess stenosis (with mild compression of L5 nerve roots)  L5-S1: transitional vertebra. Mild broad based disc protrusion R > L. Moderate facet arthropathy. Mild/mod compression of right lateral recess with compression of right S1 root.  Chronic L1 fracture   Chronic healed T11 fracture     Results for orders placed or performed during the hospital encounter of 04/03/18   MRI Brain W WO Contrast    Narrative     MRI BRAIN WITH AND WITHOUT CONTRAST    CPT: 64726    Clinical data:  Neurological deficit.  Left hand paralysis.    Comparison: CT head 04/03/2018    Technique: Multiplanar, multisequence MRI of the brain was performed before and after administration of contrast.     Findings:     The diffusion weighted imaging is  negative for acute or subacute ischemia.  Gray-white matter differentiation appears within normal limits.  No extra-axial fluid collection, hydrocephalus, mass effect, midline shift is identified.  There is no acute hemorrhage.  Visualized vascular flow voids appear intact.  Visualized orbits appear unremarkable.  Visualized paranasal sinuses and mastoid air cells appear clear.  No mass is identified.  No pathologic enhancement is noted.  There is demonstrated a shallow sella turcica with minimal pituitary tissue identified.    Impression       1. No acute intracranial abnormality.  2. No pathologic enhancement.      Electronically signed by: NITHIN HOYT MD  Date:     04/03/18  Time:    19:40    CT Head Without Contrast    Narrative    CT scan of the head without contrast    Clinical history: Weakness in the fingers of the left hand    [id number]    Findings: Multiple computerized images of the head were obtained in axial projection using thin slices.  No intravenous contrast was administered.  The study was viewed at soft tissue and bone window settings.  Reconstructions were done in coronal and sagittal planes.    No focal lesions are identified.  There is no evidence of ventricular dilatation, midline shift, or hemorrhage.  No territorial infarct was visualized.  The gray-white junction is maintained throughout.  No extracerebral fluid collection was demonstrated.  The bony calvarium appears intact.  No sinus or mastoid abnormality was noted.  The sella is shallow and very small which is of uncertain significance.  This might be better evaluated with an MRI scan.    Impression     There are no acute findings.  Note is made of the sella being shallow and somewhat small which is of uncertain significance.  An MRI scan may be helpful in further evaluation of this area.      Electronically signed by: RUTHIE MO MD  Date:     04/03/18  Time:    16:23        Lab Results   Component Value Date     (L)  06/17/2018    K 3.6 06/17/2018    MG 1.7 04/03/2018    CL 96 06/17/2018    CO2 27 06/17/2018    BUN 11 06/17/2018    CREATININE 0.74 06/17/2018    GLU 71 06/17/2018    HGBA1C 4.9 04/04/2018    AST 86 (H) 06/17/2018    ALT 46 (H) 06/17/2018    ALBUMIN 3.3 (L) 06/17/2018    PROT 6.4 06/17/2018    BILITOT 0.3 06/17/2018       Lab Results   Component Value Date    WBC 5.49 06/17/2018    HGB 11.1 (L) 06/17/2018    HCT 33.1 (L) 06/17/2018    MCV 85 06/17/2018     06/17/2018       Lab Results   Component Value Date    TSH 0.918 04/03/2018       Assessment & Plan:       Problem List Items Addressed This Visit        Neuro    Spondylosis of lumbar region without myelopathy or radiculopathy - Primary    Overview     Axial back pain more so that radicular radiation down to knees. 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 Paraspinals tender. SLR negative. SI joint non tender. + facet loading bilaterally. Pain most likely facetogenic given this exam pattern and worse pain with extension / roation.    Reviewed the records from her previous pain physician, patient had never been offered LMBBs before. She is bilateral lumbar medial branch block at L2-3-4-5 on 08/09/2018 and 08/16/2018 with 50% pain relief for approximately 12 hours after both blocks.  Proceed with RFA of the same nerves as below.    Continue duloxetine for chronic musculoskeletal pain.         Relevant Medications    oxyCODONE-acetaminophen (PERCOCET) 7.5-325 mg per tablet (Start on 8/24/2018)    morphine (MS CONTIN) 15 MG 12 hr tablet (Start on 8/24/2018)    Compression fracture of body of thoracic vertebra    Overview     Patient has known chronic T11 and L1 fractures but has a more recent T9 fracture sustained after April 2018.  She does have more upper back pain than previous and I am  concerned this may be coming from the new T9 fracture so for this reason I will refer her to Neurosurgery for consideration of vertebroplasty or kyphoplasty.         Relevant Orders    Ambulatory referral to Neurosurgery       Psychiatric    Chronically on opiate therapy    Overview     Patient would like me to take over her chronic pain management. Reviewed her previous pain physician's notes which show good relief and no signs of adverse behavior. She has been on the same regimen for chronic low back pain for years. Her UDS was consistent with prescribed substances. Opiod risk tool shows she is low risk with score 1. Her MME is low at 60 MME daily.     Sign chronic opiate contract  Continue MS contin 30mg daily  Continue Percocet 7.5mg/325mg QID prn          Relevant Medications    oxyCODONE-acetaminophen (PERCOCET) 7.5-325 mg per tablet (Start on 8/24/2018)    morphine (MS CONTIN) 15 MG 12 hr tablet (Start on 8/24/2018)       Endocrine    Panhypopituitarism    Overview     Since birth, on chronic hydrocortisone and thyroid replacement            Orthopedic    Chronic bilateral low back pain without sciatica    Overview     Combination of degenerative disc disease facet arthropathy chronic L1 compression fracture.         Relevant Medications    oxyCODONE-acetaminophen (PERCOCET) 7.5-325 mg per tablet (Start on 8/24/2018)    morphine (MS CONTIN) 15 MG 12 hr tablet (Start on 8/24/2018)              This patient has failed multiple conservative and non-invasive therapies. This procedure is medically necessary to improve the patient's quality of life through decreased pain and improved function. To date the patient has tried and failed at least 2 months of opioid medications, physical therapy, and NSAID therapy. After the procedure the plan is to continue home exercise regimen and rehabilitation as tolerated.     Discussed the risk, benefits, and alternatives with the patient. Patient wishes to proceed with  scheduling of lumbar medial branch nerve RFA at L2-3-4-5 on 8/30/18 (left) then 9/6/18 (right).     The patient voiced understanding that the produce has risk including but not limited to coma, paralysis, myocardial infarction, infection, bleeding, and death.    TESTING:  -- please obtain a disc of your most recent thoracic and lumbar MRIs in preparation for your neurosurgery consult     REFERRAL:  -- refer to neurosurgery for evaluation of T9 compression fracture     PREVENTION OF PAIN:   -- continue duloxetine 40mg daily   -- return to surgery center for lumbar medial branch nerve RFA at L2-3-4-5 on 8/30/18 (left) then 9/6/18 (right).     AS-NEEDED TREATMENT OF PAIN:  -- refill MS Contin 15mg daily #30 8/24/18 - 9/23/18   -- refill Percocet 7.5mg/325mg 4x per day #120  8/24/18 - 9/23/18   -- refill Flexeril 0mg twice a day     Follow-up in about 5 weeks (around 9/21/2018). after procedure     Kelly Prajapati MD

## 2018-08-17 NOTE — PROGRESS NOTES
Date of service: 8/17/2018  Referring provider: No ref. provider found    Subjective:      Chief complaint: Migraine       Patient ID: Radha Ramirez is a 42 y.o. 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 During that time she was able to stand up and cook herself a meal which she is normally unable to do. She was less sedentary and able to stand more than normal and was able to grocery shop.  She would like to proceed with RFA of these nerves.    In the interim I received imaging records from [facility] specifically her MRI thoracic spine without contrast which shows that she has a severe T9 compression fracture of indeterminate age but we do know it is new since 04/13/2018 and was already seen on x-ray on 06/16/2018.  She does have mid back pain but the most severe of her pains in the lower spine.  She also has the chronic severe L1 compression fracture.    She continues on her chronic opiate regimen with good relief no adverse effects and no Advair behaviors.  If her current pain score 5 with a range of 3-10.    INTERVAL HISTORY - 7/20/18    UDS done last visit 6/18/18 concordant with prescribed morphine, oxycodone and fentanyl given in ED day before    Today she reports she is about the same. Pain is in the middle and lower back, currently 6/10, range 4 to 10.     She reports she had new Xrays done showing new compression fractures with her PCP. She is pending an MRI. She will also be getting a bone density scan.    Current back pain is 6/10 with range of 4 to 10.     INTERVAL HISTORY - 6/18/18     The below problem (posterior interosseus neuropathy) has  completely resolved and did so really a week later after onset. She has had no new weakness occur.     She currently follows for chronic pain management with neurologist Dr. Hunter Gayle in [facility] but requests to change to me to have closer to home provider. Her main pain is axial back pain with radiation down the legs to the level of the knees. Her current pain score is 8. She last filled her medications on 5/26. Her stable regimen is listed below. Her mother expresses concern as to whether anything else can be done to minimize her medications. She reports she cannot get ESIs due to her chronic steroid use. She reports she has never received lumbar facet blocks / RFAs.    Her pain is currently flared because she recently became dizzy in the setting of UTI, poor PO intake, and fell, on her sacrum. Was evaluated in the ED. XR showed chronic L1 fracture, age indeterminate but new since 4/3/18 T9 fracture, and possible fracture at the sacrococcygeal junction. She was discharged after having been given toradol and percocet in the ED.     ORIGINAL NEURO HISTORY - 4/5/18   Patient woke up yesterday 6am to use restroom, noticed left hand was weak. Went back to sleep woke up at 1030 am, still weak. Reports she cannot extend the fingers. Wrsit ok. NO PAIN. There is no numbness or tingling at all. She slept on her back, with the left wrist extended and under her chin, but was never on her humerus. No previous episodes.      Incidentally found to have optic nerve thinning by eye doctor - undergoing visual fields for further diagnosis.      Takes MS contin, percocet, benadryl, flexeril before bed but all doses were typical. No alcohol before bed.     HOSPITAL COURSE:  4/5/18: no clinical changes. OT recommended outpatient therapy. MRI cervical done and normal. No pain.      #1 Left hand weakness - occurring after waking up after sleeping with left wrist extended supporting the chin. No numbness at all and no pain. Because  this initially appeared to me to be involvement of 3 separate peripheral nerves (median, ulnar, radial) or C7-T1 roots and patient has bilateral babinski I recommend cervical spine imaging with contrast which was completely normal. 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 This single nerve explains all her motor weakness and as it is a pure motor nerve explains why she has no sensory loss. Her inflammatory markers are elevated but the sudden onset and complete lack of pain argue against a mononeuritis multilpex or an early parsonage reina as the diagnosis. Thus, I do think this is compressive, from a position she sustained while sleeping. I expect this to resolve or markedly improve within 3 months.     Current acute treatment:  -- MS Contin 15mg nightly  -- Percocet 7.5mg/325mg QID     Current prevention:  -- duloxetine 40 mg - tolerating well     Previously tried:   -- none    Procedural history:   -- bilateral lumbar medial branch block at L2-3-4-5 on 08/09/2018 and 08/16/2018 with 50% pain relief for approximately 12 hours after both blocks.      Review of patient's allergies indicates:  No Known Allergies  Current Outpatient Medications   Medication Sig Dispense Refill    calcitRIOL (ROCALTROL) 0.5 MCG Cap Take 0.5 mcg by mouth every Mon, Wed, Fri.      cyclobenzaprine (FLEXERIL) 10 MG tablet Take 1 tablet (10 mg total) by mouth 2 (two) times daily. 60 tablet 11    DULoxetine (CYMBALTA) 20 MG capsule Take 2 capsules (40 mg total) by mouth once daily. 60 capsule 11    ergocalciferol (VITAMIN D2) 50,000 unit Cap  "Take 50,000 Units by mouth every 7 days.      hydrocortisone (CORTEF) 20 MG Tab Take 10 mg by mouth once daily.      levothyroxine (SYNTHROID, LEVOTHROID) 175 MCG tablet Take 175 mcg by mouth once daily.      liothyronine (CYTOMEL) 5 MCG Tab Take 25 mcg by mouth once daily.      [START ON 8/24/2018] morphine (MS CONTIN) 15 MG 12 hr tablet Take 1 tablet (15 mg total) by mouth once daily. 30 tablet 0    [START ON 8/24/2018] oxyCODONE-acetaminophen (PERCOCET) 7.5-325 mg per tablet Take 1 tablet by mouth 4 (four) times daily as needed for Pain. 120 tablet 0    promethazine (PHENERGAN) 25 MG tablet Take 25 mg by mouth every 4 (four) hours.       No current facility-administered medications for this visit.        Past Medical History  Past Medical History:   Diagnosis Date    Adrenal insufficiency     Arthritis     "Bulging and herniated lumbar discs"     Chronic instability of knee     Humerus fracture     Panhypopituitarism     Pelvic fracture     Rib fracture     Thyroid disease        Past Surgical History  Past Surgical History:   Procedure Laterality Date    GASTRIC BYPASS  2003       Family History  History reviewed. No pertinent family history.    Social History  Social History     Socioeconomic History    Marital status: Single     Spouse name: Not on file    Number of children: Not on file    Years of education: Not on file    Highest education level: Not on file   Social Needs    Financial resource strain: Not on file    Food insecurity - worry: Not on file    Food insecurity - inability: Not on file    Transportation needs - medical: Not on file    Transportation needs - non-medical: Not on file   Occupational History    Not on file   Tobacco Use    Smoking status: Never Smoker    Smokeless tobacco: Never Used   Substance and Sexual Activity    Alcohol use: No    Drug use: No    Sexual activity: Not on file   Other Topics Concern    Not on file   Social History Narrative    Not " on file        Review of Systems  14-point review of systems as follows:   No check adore indicates NEGATIVE response   Constitutional: [] weight loss, [] change to appetite   Eyes: [] change in vision, [] double vision   Ears, nose, mouth, throat: [] frequent nose bleeds, [] ringing in the ears   Respiratory: [] cough, [] wheezing   Cardiovascular: [] chest pain, [] palpitations   Gastrointestinal: [] jaundice, [] nausea/vomiting   Genitourinary: [] incontinence, [] burning with urination   Hematologic/lymphatic: [x] easy bruising/bleeding, [] night sweats   Neurological: [] numbness, [] weakness   Endocrine: [] fatigue, [] heat/cold intolerance   Allergy/Immunologic: [] fevers, [] chills   Musculoskeletal: [] muscle pain, [x] joint pain   Psychiatric: [] thoughts of harming self/others, [] depression   Integumentary: [] rashes, [] sores that do not heal     Objective:        Vitals:    08/17/18 1609   BP: (!) 100/58     Body mass index is 22.34 kg/m².    General: Well developed, well nourished.  No acute distress.   HEENT: Atraumatic, normocephalic. Poor dentition.   Neck: Trachea midline  Cardiovascular: Vitals reviewed. Normal peripheral perfusion.   Pulmonary: No increased work of breathing.  Abdomen/GI: No guarding  Musculoskeletal: No obvious joint deformities, moves all extremities symmetrically and well.     LUMBAR SPINE:  INSPECTION: no lesions   ROM: normal flexion but limited extension and lateral rotation   MUSCLE SPASM: mild   PARASPINAL TENDERNESS: bilateral   FACET LOADING: bilateral left worse than right   SI JOINT TENDERNESS: neg  JI: neg  STRAIGHT LEG RAISE: neg  CROSSED STRAIGHT LEG RAISE: neg  HIP INTERNAL ROM: normal  HIP EXTERNAL ROM: normal   GT TENDERNESS:       Data Review:     I have personally reviewed the referring provider's notes, labs, & imaging made available to me today.     RADIOLOGY STUDIES:  I have personally reviewed the pertinent images performed.     7/24/18 MRI thoracic  spine without contrast  1.  Severe T9 compression fracture of indeterminate age which is at least partially subacute with minimal bone edema.  2.  Severe L1 compression fracture which appears chronic, with no associated bone marrow edema.  3.  Chronic T11 Schmorl's with no associated bone marrow edema.  4.  Mild multilevel degenerative disc disease  5.  There is no spinal or foraminal stenosis appreciated  6.  Cholelithiasis    07/24/2018 MRI lumbar spine without contrast  1.  Severe chronic L1 compression fracture with no bone marrow edema appreciated  2.  Mild degenerative disc disease and mild to moderate facet arthropathy predominantly lower spine    08/01/2018 NM HIDA scan  1.  No evidence of cystic or common duct obstruction  2.  Normal gallbladder function    6/8/15 MRI lumbar spine w/o contrast:  L1-2: minimal broad based disc protrusion, moderate facet arthropathy  L2-3: mild broad based disc protrusion L > R, annular tear, moderate facet arthropathy, mild stenosis left lateral recess (with mild compression of left L3 root)  L3-4: mild broad based disc protrusion, mild/moderate facet arthropathy   L4-5: mild broad based disc protrusion, moderate/severe facet arthropathy, mild lateral recess stenosis (with mild compression of L5 nerve roots)  L5-S1: transitional vertebra. Mild broad based disc protrusion R > L. Moderate facet arthropathy. Mild/mod compression of right lateral recess with compression of right S1 root.  Chronic L1 fracture   Chronic healed T11 fracture     Results for orders placed or performed during the hospital encounter of 04/03/18   MRI Brain W WO Contrast    Narrative     MRI BRAIN WITH AND WITHOUT CONTRAST    CPT: 91660    Clinical data:  Neurological deficit.  Left hand paralysis.    Comparison: CT head 04/03/2018    Technique: Multiplanar, multisequence MRI of the brain was performed before and after administration of contrast.     Findings:     The diffusion weighted imaging is  negative for acute or subacute ischemia.  Gray-white matter differentiation appears within normal limits.  No extra-axial fluid collection, hydrocephalus, mass effect, midline shift is identified.  There is no acute hemorrhage.  Visualized vascular flow voids appear intact.  Visualized orbits appear unremarkable.  Visualized paranasal sinuses and mastoid air cells appear clear.  No mass is identified.  No pathologic enhancement is noted.  There is demonstrated a shallow sella turcica with minimal pituitary tissue identified.    Impression       1. No acute intracranial abnormality.  2. No pathologic enhancement.      Electronically signed by: NITHIN HOYT MD  Date:     04/03/18  Time:    19:40    CT Head Without Contrast    Narrative    CT scan of the head without contrast    Clinical history: Weakness in the fingers of the left hand    [id number]    Findings: Multiple computerized images of the head were obtained in axial projection using thin slices.  No intravenous contrast was administered.  The study was viewed at soft tissue and bone window settings.  Reconstructions were done in coronal and sagittal planes.    No focal lesions are identified.  There is no evidence of ventricular dilatation, midline shift, or hemorrhage.  No territorial infarct was visualized.  The gray-white junction is maintained throughout.  No extracerebral fluid collection was demonstrated.  The bony calvarium appears intact.  No sinus or mastoid abnormality was noted.  The sella is shallow and very small which is of uncertain significance.  This might be better evaluated with an MRI scan.    Impression     There are no acute findings.  Note is made of the sella being shallow and somewhat small which is of uncertain significance.  An MRI scan may be helpful in further evaluation of this area.      Electronically signed by: RUTHIE MO MD  Date:     04/03/18  Time:    16:23        Lab Results   Component Value Date     (L)  06/17/2018    K 3.6 06/17/2018    MG 1.7 04/03/2018    CL 96 06/17/2018    CO2 27 06/17/2018    BUN 11 06/17/2018    CREATININE 0.74 06/17/2018    GLU 71 06/17/2018    HGBA1C 4.9 04/04/2018    AST 86 (H) 06/17/2018    ALT 46 (H) 06/17/2018    ALBUMIN 3.3 (L) 06/17/2018    PROT 6.4 06/17/2018    BILITOT 0.3 06/17/2018       Lab Results   Component Value Date    WBC 5.49 06/17/2018    HGB 11.1 (L) 06/17/2018    HCT 33.1 (L) 06/17/2018    MCV 85 06/17/2018     06/17/2018       Lab Results   Component Value Date    TSH 0.918 04/03/2018       Assessment & Plan:       Problem List Items Addressed This Visit        Neuro    Spondylosis of lumbar region without myelopathy or radiculopathy - Primary    Overview     Axial back pain more so that radicular radiation down to knees. 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 Paraspinals tender. SLR negative. SI joint non tender. + facet loading bilaterally. Pain most likely facetogenic given this exam pattern and worse pain with extension / roation.    Reviewed the records from her previous pain physician, patient had never been offered LMBBs before. She is bilateral lumbar medial branch block at L2-3-4-5 on 08/09/2018 and 08/16/2018 with 50% pain relief for approximately 12 hours after both blocks.  Proceed with RFA of the same nerves as below.    Continue duloxetine for chronic musculoskeletal pain.         Relevant Medications    oxyCODONE-acetaminophen (PERCOCET) 7.5-325 mg per tablet (Start on 8/24/2018)    morphine (MS CONTIN) 15 MG 12 hr tablet (Start on 8/24/2018)    Compression fracture of body of thoracic vertebra    Overview     Patient has known chronic T11 and L1 fractures but has a more recent T9 fracture sustained after April 2018.  She does have more upper back pain than previous and I am  concerned this may be coming from the new T9 fracture so for this reason I will refer her to Neurosurgery for consideration of vertebroplasty or kyphoplasty.         Relevant Orders    Ambulatory referral to Neurosurgery       Psychiatric    Chronically on opiate therapy    Overview     Patient would like me to take over her chronic pain management. Reviewed her previous pain physician's notes which show good relief and no signs of adverse behavior. She has been on the same regimen for chronic low back pain for years. Her UDS was consistent with prescribed substances. Opiod risk tool shows she is low risk with score 1. Her MME is low at 60 MME daily.     Sign chronic opiate contract  Continue MS contin 30mg daily  Continue Percocet 7.5mg/325mg QID prn          Relevant Medications    oxyCODONE-acetaminophen (PERCOCET) 7.5-325 mg per tablet (Start on 8/24/2018)    morphine (MS CONTIN) 15 MG 12 hr tablet (Start on 8/24/2018)       Endocrine    Panhypopituitarism    Overview     Since birth, on chronic hydrocortisone and thyroid replacement            Orthopedic    Chronic bilateral low back pain without sciatica    Overview     Combination of degenerative disc disease facet arthropathy chronic L1 compression fracture.         Relevant Medications    oxyCODONE-acetaminophen (PERCOCET) 7.5-325 mg per tablet (Start on 8/24/2018)    morphine (MS CONTIN) 15 MG 12 hr tablet (Start on 8/24/2018)              This patient has failed multiple conservative and non-invasive therapies. This procedure is medically necessary to improve the patient's quality of life through decreased pain and improved function. To date the patient has tried and failed at least 2 months of opioid medications, physical therapy, and NSAID therapy. After the procedure the plan is to continue home exercise regimen and rehabilitation as tolerated.     Discussed the risk, benefits, and alternatives with the patient. Patient wishes to proceed with  scheduling of lumbar medial branch nerve RFA at L2-3-4-5 on 8/30/18 (left) then 9/6/18 (right).     The patient voiced understanding that the produce has risk including but not limited to coma, paralysis, myocardial infarction, infection, bleeding, and death.    TESTING:  -- please obtain a disc of your most recent thoracic and lumbar MRIs in preparation for your neurosurgery consult     REFERRAL:  -- refer to neurosurgery for evaluation of T9 compression fracture     PREVENTION OF PAIN:   -- continue duloxetine 40mg daily   -- return to surgery center for lumbar medial branch nerve RFA at L2-3-4-5 on 8/30/18 (left) then 9/6/18 (right).     AS-NEEDED TREATMENT OF PAIN:  -- refill MS Contin 15mg daily #30 8/24/18 - 9/23/18   -- refill Percocet 7.5mg/325mg 4x per day #120  8/24/18 - 9/23/18   -- refill Flexeril 0mg twice a day     Follow-up in about 5 weeks (around 9/21/2018). after procedure     Kelly Prajapati MD

## 2018-08-24 DIAGNOSIS — M47.816 LUMBAR FACET ARTHROPATHY: Primary | ICD-10-CM

## 2018-08-24 RX ORDER — SODIUM CHLORIDE, SODIUM LACTATE, POTASSIUM CHLORIDE, CALCIUM CHLORIDE 600; 310; 30; 20 MG/100ML; MG/100ML; MG/100ML; MG/100ML
INJECTION, SOLUTION INTRAVENOUS CONTINUOUS
Status: CANCELLED | OUTPATIENT
Start: 2018-09-06

## 2018-08-30 ENCOUNTER — HOSPITAL ENCOUNTER (OUTPATIENT)
Facility: HOSPITAL | Age: 43
Discharge: HOME | End: 2018-08-30
Attending: PSYCHIATRY & NEUROLOGY | Admitting: PSYCHIATRY & NEUROLOGY
Payer: MEDICARE

## 2018-08-30 ENCOUNTER — HOSPITAL ENCOUNTER (OUTPATIENT)
Dept: RADIOLOGY | Facility: HOSPITAL | Age: 43
Discharge: HOME | End: 2018-08-30
Attending: PSYCHIATRY & NEUROLOGY | Admitting: PSYCHIATRY & NEUROLOGY
Payer: MEDICARE

## 2018-08-30 ENCOUNTER — TELEPHONE (OUTPATIENT)
Dept: NEUROLOGY | Facility: CLINIC | Age: 43
End: 2018-08-30

## 2018-08-30 DIAGNOSIS — M51.36 DDD (DEGENERATIVE DISC DISEASE), LUMBAR: ICD-10-CM

## 2018-08-30 DIAGNOSIS — M47.816 LUMBAR FACET ARTHROPATHY: Primary | ICD-10-CM

## 2018-08-30 PROCEDURE — 63600175 PHARM REV CODE 636 W HCPCS: Mod: PO | Performed by: PSYCHIATRY & NEUROLOGY

## 2018-08-30 PROCEDURE — 64635 DESTROY LUMB/SAC FACET JNT: CPT | Mod: PO | Performed by: PSYCHIATRY & NEUROLOGY

## 2018-08-30 PROCEDURE — 25000003 PHARM REV CODE 250: Mod: PO | Performed by: PSYCHIATRY & NEUROLOGY

## 2018-08-30 PROCEDURE — 76000 FLUOROSCOPY <1 HR PHYS/QHP: CPT | Mod: TC,PO

## 2018-08-30 PROCEDURE — 64636 DESTROY L/S FACET JNT ADDL: CPT | Mod: PO | Performed by: PSYCHIATRY & NEUROLOGY

## 2018-08-30 RX ORDER — SODIUM CHLORIDE, SODIUM LACTATE, POTASSIUM CHLORIDE, CALCIUM CHLORIDE 600; 310; 30; 20 MG/100ML; MG/100ML; MG/100ML; MG/100ML
INJECTION, SOLUTION INTRAVENOUS CONTINUOUS
Status: DISCONTINUED | OUTPATIENT
Start: 2018-08-30 | End: 2018-08-30 | Stop reason: HOSPADM

## 2018-08-30 RX ORDER — FENTANYL CITRATE 50 UG/ML
INJECTION, SOLUTION INTRAMUSCULAR; INTRAVENOUS
Status: DISCONTINUED | OUTPATIENT
Start: 2018-08-30 | End: 2018-08-30 | Stop reason: HOSPADM

## 2018-08-30 RX ORDER — BUPIVACAINE HYDROCHLORIDE 2.5 MG/ML
INJECTION, SOLUTION EPIDURAL; INFILTRATION; INTRACAUDAL
Status: DISCONTINUED | OUTPATIENT
Start: 2018-08-30 | End: 2018-08-30 | Stop reason: HOSPADM

## 2018-08-30 RX ORDER — MIDAZOLAM HYDROCHLORIDE 2 MG/2ML
INJECTION, SOLUTION INTRAMUSCULAR; INTRAVENOUS
Status: DISCONTINUED | OUTPATIENT
Start: 2018-08-30 | End: 2018-08-30 | Stop reason: HOSPADM

## 2018-08-30 RX ORDER — LIDOCAINE HYDROCHLORIDE 10 MG/ML
INJECTION, SOLUTION EPIDURAL; INFILTRATION; INTRACAUDAL; PERINEURAL
Status: DISCONTINUED | OUTPATIENT
Start: 2018-08-30 | End: 2018-08-30 | Stop reason: HOSPADM

## 2018-08-30 RX ADMIN — SODIUM CHLORIDE, SODIUM LACTATE, POTASSIUM CHLORIDE, AND CALCIUM CHLORIDE: .6; .31; .03; .02 INJECTION, SOLUTION INTRAVENOUS at 10:08

## 2018-08-30 NOTE — INTERVAL H&P NOTE
The patient has been examined and the H&P has been reviewed:    I concur with the findings and no changes have occurred since H&P was written.    Anesthesia/Surgery risks, benefits and alternative options discussed and understood by patient/family.          Active Hospital Problems    Diagnosis  POA    Lumbar facet arthropathy [M46.96]  Yes      Resolved Hospital Problems   No resolved problems to display.

## 2018-08-30 NOTE — DISCHARGE SUMMARY
[facility]  Discharge Note  Short Stay    Admit Date: 8/30/2018    Discharge Date: 8/30/2018    Attending Physician: Kelly Prajapati MD     Discharge Provider: Kelly Prajapati    Diagnoses:  Active Hospital Problems    Diagnosis  POA    *Lumbar facet arthropathy [M46.96]  Yes      Resolved Hospital Problems   No resolved problems to display.       Discharged Condition: good    Final Diagnoses: Lumbar facet arthropathy [M46.96]    Disposition: Home or Self Care    Hospital Course: no complications, uneventful    Outcome of Hospitalization, Treatment, Procedure, or Surgery:  Patient was admitted for outpatient procedure. The patient underwent procedure without complications and are discharged home    Follow up/Patient Instructions:  Follow up as scheduled/Patient has received instructions and follow up date    Medications:  Continue previous medications    Discharge Procedure Orders   Notify your health care provider if you experience any of the following:  temperature >100.4     Notify your health care provider if you experience any of the following:  persistent nausea and vomiting or diarrhea     Notify your health care provider if you experience any of the following:  severe uncontrolled pain     Notify your health care provider if you experience any of the following:  redness, tenderness, or signs of infection (pain, swelling, redness, odor or green/yellow discharge around incision site)     Notify your health care provider if you experience any of the following:  difficulty breathing or increased cough     Notify your health care provider if you experience any of the following:  severe persistent headache     Notify your health care provider if you experience any of the following:  worsening rash     Notify your health care provider if you experience any of the following:  persistent dizziness, light-headedness, or visual disturbances     Notify your health care provider if you experience any of the  following:  increased confusion or weakness     No dressing needed         Discharge Procedure Orders (must include Diet, Follow-up, Activity):   Discharge Procedure Orders (must include Diet, Follow-up, Activity)   Notify your health care provider if you experience any of the following:  temperature >100.4     Notify your health care provider if you experience any of the following:  persistent nausea and vomiting or diarrhea     Notify your health care provider if you experience any of the following:  severe uncontrolled pain     Notify your health care provider if you experience any of the following:  redness, tenderness, or signs of infection (pain, swelling, redness, odor or green/yellow discharge around incision site)     Notify your health care provider if you experience any of the following:  difficulty breathing or increased cough     Notify your health care provider if you experience any of the following:  severe persistent headache     Notify your health care provider if you experience any of the following:  worsening rash     Notify your health care provider if you experience any of the following:  persistent dizziness, light-headedness, or visual disturbances     Notify your health care provider if you experience any of the following:  increased confusion or weakness     No dressing needed

## 2018-08-30 NOTE — OP NOTE
PROCEDURE DATE: 8/30/2018    PROCEDURE:  Radiofrequency ablation of the lumbar L2, L3, L4, and L5 medial branch nerves on the LEFT-side utilizing fluoroscopy    CPT:  52360  64636 x 2     DIAGNOSIS:  Lumbar spondylosis    Post op Diagnosis: Same    PHYSICIAN: Kelly Prajapati MD    MEDICATIONS INJECTED:  Lidocaine 1% (pre-RFA) 1cc per level, bupivacaine 0.25% 1cc per level (post-RFA)    LOCAL ANESTHETIC USED: Lidocaine 1%    SEDATION: midazolam 2mg, fentanyl 50mcg IV     ESTIMATED BLOOD LOSS:  none    COMPLICATIONS:  none    TECHNIQUE:  A time out was taken to identify patient and procedure side prior to starting the procedure. Laying in a prone position, the patient was prepped and draped in the usual sterile fashion using ChloraPrep and sterile towels.  The levels were determined under fluoroscopic guidance and then marked.  Local anesthetic was given by raising a wheal at the skin over each site and then infiltrated approximately 2cm deeper.  A 20-gauge  100 mm Ecosia RF needle was introduced to the anatomic location of the L2, L3, L4, and L5 medial branch nerves.  Motor stimulation up to 2 Volts at each level confirmed no motor nerve involvement.  Impedance was less than 800 ohms at each level. Stimulation probes were removed and after negative aspiration of heme and CSF, 1cc of preservative-free 1% lidocaine was injected at each nerve level prior to ablation and 60 seconds was allowed to pass.  Ablation was performed per level utilizing Ecosia radiofrequency generator 80°C for 90 seconds. Afterwards each level was infiltrated with the above bupivacaine/steroid solution prior to removing RF needle.    The patient tolerated the procedure well.     The patient was monitored after the procedure.  Patient was given post procedure and discharge instructions to follow at home.  The patient was discharged in a stable condition

## 2018-08-31 ENCOUNTER — TELEPHONE (OUTPATIENT)
Dept: NEUROLOGY | Facility: CLINIC | Age: 43
End: 2018-08-31

## 2018-08-31 VITALS
OXYGEN SATURATION: 100 % | HEART RATE: 64 BPM | DIASTOLIC BLOOD PRESSURE: 60 MMHG | SYSTOLIC BLOOD PRESSURE: 100 MMHG | RESPIRATION RATE: 16 BRPM | TEMPERATURE: 97 F

## 2018-08-31 NOTE — TELEPHONE ENCOUNTER
"Called and spoke with patient. Patient said, "I am feeling alright". She is a little sore this morning. She has been putting ice on it since yesterday. Directions given in regards to heat application after the 2 days of icing. Appointment scheduled. Patient verbalized understanding.   "

## 2018-09-05 ENCOUNTER — TELEPHONE (OUTPATIENT)
Dept: NEUROLOGY | Facility: CLINIC | Age: 43
End: 2018-09-05

## 2018-09-05 RX ORDER — DICYCLOMINE HYDROCHLORIDE 20 MG/1
20 TABLET ORAL 4 TIMES DAILY PRN
COMMUNITY

## 2018-09-05 NOTE — TELEPHONE ENCOUNTER
"Returned call and spoke with patient. Patient is having involuntary movement while sleeping. Patient said, "it feel like a twitch". Left will move first then right. It started a couple days after the last procedure, that was when she first noticed it. Patient said it almost constant. Please advise.   "

## 2018-09-06 ENCOUNTER — HOSPITAL ENCOUNTER (OUTPATIENT)
Dept: RADIOLOGY | Facility: HOSPITAL | Age: 43
Discharge: HOME | End: 2018-09-06
Attending: PSYCHIATRY & NEUROLOGY | Admitting: PSYCHIATRY & NEUROLOGY
Payer: MEDICARE

## 2018-09-06 ENCOUNTER — TELEPHONE (OUTPATIENT)
Dept: NEUROLOGY | Facility: CLINIC | Age: 43
End: 2018-09-06

## 2018-09-06 ENCOUNTER — HOSPITAL ENCOUNTER (OUTPATIENT)
Facility: HOSPITAL | Age: 43
Discharge: HOME | End: 2018-09-06
Attending: PSYCHIATRY & NEUROLOGY | Admitting: PSYCHIATRY & NEUROLOGY
Payer: MEDICARE

## 2018-09-06 VITALS
SYSTOLIC BLOOD PRESSURE: 112 MMHG | HEIGHT: 63 IN | TEMPERATURE: 98 F | OXYGEN SATURATION: 98 % | WEIGHT: 128 LBS | BODY MASS INDEX: 22.68 KG/M2 | DIASTOLIC BLOOD PRESSURE: 67 MMHG | HEART RATE: 67 BPM | RESPIRATION RATE: 16 BRPM

## 2018-09-06 DIAGNOSIS — M51.36 DDD (DEGENERATIVE DISC DISEASE), LUMBAR: ICD-10-CM

## 2018-09-06 DIAGNOSIS — M47.816 LUMBAR FACET ARTHROPATHY: ICD-10-CM

## 2018-09-06 PROCEDURE — 64635 DESTROY LUMB/SAC FACET JNT: CPT | Mod: PO | Performed by: PSYCHIATRY & NEUROLOGY

## 2018-09-06 PROCEDURE — 63600175 PHARM REV CODE 636 W HCPCS: Mod: PO | Performed by: PSYCHIATRY & NEUROLOGY

## 2018-09-06 PROCEDURE — 25000003 PHARM REV CODE 250: Mod: PO | Performed by: PSYCHIATRY & NEUROLOGY

## 2018-09-06 PROCEDURE — 76000 FLUOROSCOPY <1 HR PHYS/QHP: CPT | Mod: TC,PO

## 2018-09-06 PROCEDURE — 64636 DESTROY L/S FACET JNT ADDL: CPT | Mod: PO | Performed by: PSYCHIATRY & NEUROLOGY

## 2018-09-06 RX ORDER — LIDOCAINE HYDROCHLORIDE 10 MG/ML
1 INJECTION, SOLUTION EPIDURAL; INFILTRATION; INTRACAUDAL; PERINEURAL ONCE
Status: COMPLETED | OUTPATIENT
Start: 2018-09-06 | End: 2018-09-06

## 2018-09-06 RX ORDER — MIDAZOLAM HYDROCHLORIDE 1 MG/ML
INJECTION INTRAMUSCULAR; INTRAVENOUS
Status: DISCONTINUED | OUTPATIENT
Start: 2018-09-06 | End: 2018-09-06 | Stop reason: HOSPADM

## 2018-09-06 RX ORDER — SODIUM CHLORIDE, SODIUM LACTATE, POTASSIUM CHLORIDE, CALCIUM CHLORIDE 600; 310; 30; 20 MG/100ML; MG/100ML; MG/100ML; MG/100ML
INJECTION, SOLUTION INTRAVENOUS CONTINUOUS
Status: DISCONTINUED | OUTPATIENT
Start: 2018-09-06 | End: 2018-09-06 | Stop reason: HOSPADM

## 2018-09-06 RX ORDER — BUPIVACAINE HYDROCHLORIDE 2.5 MG/ML
INJECTION, SOLUTION EPIDURAL; INFILTRATION; INTRACAUDAL
Status: DISCONTINUED | OUTPATIENT
Start: 2018-09-06 | End: 2018-09-06 | Stop reason: HOSPADM

## 2018-09-06 RX ORDER — LIDOCAINE HYDROCHLORIDE 10 MG/ML
INJECTION, SOLUTION EPIDURAL; INFILTRATION; INTRACAUDAL; PERINEURAL
Status: DISCONTINUED | OUTPATIENT
Start: 2018-09-06 | End: 2018-09-06 | Stop reason: HOSPADM

## 2018-09-06 RX ORDER — FENTANYL CITRATE 50 UG/ML
INJECTION, SOLUTION INTRAMUSCULAR; INTRAVENOUS
Status: DISCONTINUED | OUTPATIENT
Start: 2018-09-06 | End: 2018-09-06 | Stop reason: HOSPADM

## 2018-09-06 RX ADMIN — LIDOCAINE HYDROCHLORIDE 5 MG: 10 INJECTION, SOLUTION EPIDURAL; INFILTRATION; INTRACAUDAL; PERINEURAL at 08:09

## 2018-09-06 RX ADMIN — SODIUM CHLORIDE, SODIUM LACTATE, POTASSIUM CHLORIDE, AND CALCIUM CHLORIDE: .6; .31; .03; .02 INJECTION, SOLUTION INTRAVENOUS at 08:09

## 2018-09-06 NOTE — TELEPHONE ENCOUNTER
----- Message from Magdalena Escobar sent at 9/6/2018 12:16 PM CDT -----  Contact: Patient's mom, Sheridan Duong  Patient's mom would like to speak with Tisha because the patient fell and hit her face when she was getting lunch after her nerve block this morning.  Patient's mom is probably going to bring the patient back to [facility].  Call Back#[phone]  Thanks

## 2018-09-06 NOTE — DISCHARGE SUMMARY
[facility]  Discharge Note  Short Stay    Admit Date: 9/6/2018    Discharge Date: 9/6/2018    Attending Physician: Kelly Prajapati MD     Discharge Provider: Kelly Prajapati    Diagnoses:  Active Hospital Problems    Diagnosis  POA    *Lumbar facet arthropathy [M46.96]  Yes      Resolved Hospital Problems   No resolved problems to display.       Discharged Condition: good    Final Diagnoses: Lumbar facet arthropathy [M46.96]    Disposition: Home or Self Care    Hospital Course: no complications, uneventful    Outcome of Hospitalization, Treatment, Procedure, or Surgery:  Patient was admitted for outpatient procedure. The patient underwent procedure without complications and are discharged home    Follow up/Patient Instructions:  Follow up as scheduled/Patient has received instructions and follow up date    Medications:  Continue previous medications    Discharge Procedure Orders   Notify your health care provider if you experience any of the following:  temperature >100.4     Notify your health care provider if you experience any of the following:  persistent nausea and vomiting or diarrhea     Notify your health care provider if you experience any of the following:  severe uncontrolled pain     Notify your health care provider if you experience any of the following:  redness, tenderness, or signs of infection (pain, swelling, redness, odor or green/yellow discharge around incision site)     Notify your health care provider if you experience any of the following:  difficulty breathing or increased cough     Notify your health care provider if you experience any of the following:  severe persistent headache     Notify your health care provider if you experience any of the following:  worsening rash     Notify your health care provider if you experience any of the following:  persistent dizziness, light-headedness, or visual disturbances     Notify your health care provider if you experience any of the  following:  increased confusion or weakness     No dressing needed         Discharge Procedure Orders (must include Diet, Follow-up, Activity):   Discharge Procedure Orders (must include Diet, Follow-up, Activity)   Notify your health care provider if you experience any of the following:  temperature >100.4     Notify your health care provider if you experience any of the following:  persistent nausea and vomiting or diarrhea     Notify your health care provider if you experience any of the following:  severe uncontrolled pain     Notify your health care provider if you experience any of the following:  redness, tenderness, or signs of infection (pain, swelling, redness, odor or green/yellow discharge around incision site)     Notify your health care provider if you experience any of the following:  difficulty breathing or increased cough     Notify your health care provider if you experience any of the following:  severe persistent headache     Notify your health care provider if you experience any of the following:  worsening rash     Notify your health care provider if you experience any of the following:  persistent dizziness, light-headedness, or visual disturbances     Notify your health care provider if you experience any of the following:  increased confusion or weakness     No dressing needed

## 2018-09-06 NOTE — TELEPHONE ENCOUNTER
----- Message from Kelly Prajapati MD sent at 9/6/2018  9:46 AM CDT -----   Please call patient Friday/next business day to -    2. RFA - check on how patient is doing. Determine relief if any. If still in significant post-procedural pain remind to ice for 20 minutes every 2 hours for rest of day; gentle heat can be started for muscle spasm once full 1-2 days of icing is complete. Make sure patient has a 4 week clinic follow up with me after the LAST RFA.

## 2018-09-06 NOTE — DISCHARGE INSTRUCTIONS
Home care instructions  Apply ice pack to the injection site for 20 minutes periods for the first 24 hrs for soreness/discomfort at injection site DO NOT USE HEAT FOR 24 HOURS  Keep site clean and dry for 24 hours  Do not drive until tomorrow  Take care when walking after a LOWER BACK LUMBAR SPINE NERVE ROOT ABLATION/BURNING   Avoid strenuous activities for 2 days  Make take 2 weeks to feel the full effects   Resume home medication as prescribed today  Resume Aspirin, Plavix, or Coumadin the day after the procedure unless otherwise instructed.    SEE IMMEDIATE MEDICAL HELP FOR:  Severe increase in your usual pain or appearance of new pain  Prolonged or increasing weakness or numbness in the legs or arms  Drainage, redness, active bleeding, or increased swelling at the injection site  Temperature over 100.0 degrees F.  Headache that increases when your head is upright and decreases when you lie flat    CALL 911 OR GO DIRECTLY TO EMERGENCY DEPARTMENT FOR:  Shortness of breath, chest pain, or problems breathing            Recovery After Procedural Sedation (Adult)  You have been given medicine by vein to make you sleep during your surgery. This may have included both a pain medicine and sleeping medicine. Most of the effects have worn off. But you may still have some drowsiness for the next 6 to 8 hours.  Home care  Follow these guidelines when you get home:  · For the next 8 hours, you should be watched by a responsible adult. This person should make sure your condition is not getting worse.  · Don't drink any alcohol for the next 24 hours.  · Don't drive, operate dangerous machinery, or make important business or personal decisions during the next 24 hours.  Note: Your healthcare provider may tell you not to take any medicine by mouth for pain or sleep in the next 4 hours. These medicines may react with the medicines you were given in the hospital. This could cause a much stronger response than usual.  Follow-up  care  Follow up with your healthcare provider if you are not alert and back to your usual level of activity within 12 hours.  When to seek medical advice  Call your healthcare provider right away if any of these occur:  · Drowsiness gets worse  · Weakness or dizziness gets worse  · Repeated vomiting  · You can't be awakened   Date Last Reviewed: 10/18/2016  © 5007-4943 [employer]. [address] [place] [address], [place] [address]. All rights reserved. This information is not intended as a substitute for professional medical care. Always follow your healthcare professional's instructions.

## 2018-09-06 NOTE — OP NOTE
PROCEDURE DATE: 9/6/2018    PROCEDURE:  Radiofrequency ablation of the lumbar L2, L3, L4, and L5 medial branch nerves on the RIGHT -side utilizing fluoroscopy    CPT:  94399  64636 x 2     DIAGNOSIS:  Lumbar spondylosis    Post op Diagnosis: Same    PHYSICIAN: Kelly Prajapati MD    MEDICATIONS INJECTED:  Lidocaine 1% (pre-RFA) 1cc per level, bupivacaine 0.25% 1cc per level (post-RFA)    LOCAL ANESTHETIC USED: Lidocaine 1%    SEDATION: midazolam 2mg IV, fentanyl 25mcg IV     ESTIMATED BLOOD LOSS:  none    COMPLICATIONS:  none    TECHNIQUE:  A time out was taken to identify patient and procedure side prior to starting the procedure. Laying in a prone position, the patient was prepped and draped in the usual sterile fashion using ChloraPrep and sterile towels.  The levels were determined under fluoroscopic guidance and then marked.  Local anesthetic was given by raising a wheal at the skin over each site and then infiltrated approximately 2cm deeper.  A 20-gauge  100 mm NotaryAct RF needle was introduced to the anatomic location of the L2, L3, L4, and L5 medial branch nerves.  Motor stimulation up to 2 Volts at each level confirmed no motor nerve involvement.  Impedance was less than 800 ohms at each level. Stimulation probes were removed and after negative aspiration of heme and CSF, 1cc of preservative-free 1% lidocaine was injected at each nerve level prior to ablation and 60 seconds was allowed to pass.  Ablation was performed per level utilizing NotaryAct radiofrequency generator 80°C for 90 seconds. Afterwards each level was infiltrated with the above bupivacaine/steroid solution prior to removing RF needle.    The patient tolerated the procedure well.     The patient was monitored after the procedure.  Patient was given post procedure and discharge instructions to follow at home.  The patient was discharged in a stable condition

## 2018-09-06 NOTE — TELEPHONE ENCOUNTER
Returned patients call in regards to procedure today. Patient stated she fell today after her procedure  and hurt her face , patient stated she scratched her face.   Patient stated she was weak because of not eating due to her procedure  and she fell and hit her face while trying to walk across the street quickly.  Informed patient I will send a message to Dr pichardo to inform her .

## 2018-09-07 NOTE — TELEPHONE ENCOUNTER
"Called and spoke with patient. Patient rated her pain at 50%. She is still a little sore from the procedure and her fall yesterday, but other patient states, "she is fine". Follow up appointment scheduled. Patient verbalized understanding.   "

## 2018-09-21 ENCOUNTER — OFFICE VISIT (OUTPATIENT)
Dept: NEUROLOGY | Facility: CLINIC | Age: 43
End: 2018-09-21
Payer: MEDICARE

## 2018-09-21 VITALS — RESPIRATION RATE: 16 BRPM | WEIGHT: 127.13 LBS | BODY MASS INDEX: 22.53 KG/M2 | HEIGHT: 63 IN

## 2018-09-21 DIAGNOSIS — S22.000A COMPRESSION FRACTURE OF BODY OF THORACIC VERTEBRA: Primary | ICD-10-CM

## 2018-09-21 DIAGNOSIS — M54.50 CHRONIC BILATERAL LOW BACK PAIN WITHOUT SCIATICA: ICD-10-CM

## 2018-09-21 DIAGNOSIS — Z79.891 CHRONICALLY ON OPIATE THERAPY: ICD-10-CM

## 2018-09-21 DIAGNOSIS — G89.29 CHRONIC BILATERAL LOW BACK PAIN WITHOUT SCIATICA: ICD-10-CM

## 2018-09-21 DIAGNOSIS — M47.816 SPONDYLOSIS OF LUMBAR REGION WITHOUT MYELOPATHY OR RADICULOPATHY: ICD-10-CM

## 2018-09-21 PROCEDURE — 99213 OFFICE O/P EST LOW 20 MIN: CPT | Mod: PBBFAC,PO | Performed by: PSYCHIATRY & NEUROLOGY

## 2018-09-21 PROCEDURE — 99999 PR PBB SHADOW E&M-EST. PATIENT-LVL III: CPT | Mod: PBBFAC,,, | Performed by: PSYCHIATRY & NEUROLOGY

## 2018-09-21 PROCEDURE — 3008F BODY MASS INDEX DOCD: CPT | Mod: ,,, | Performed by: PSYCHIATRY & NEUROLOGY

## 2018-09-21 PROCEDURE — 99214 OFFICE O/P EST MOD 30 MIN: CPT | Mod: S$PBB,,, | Performed by: PSYCHIATRY & NEUROLOGY

## 2018-09-21 RX ORDER — MORPHINE SULFATE 15 MG/1
15 TABLET, FILM COATED, EXTENDED RELEASE ORAL DAILY
Qty: 30 TABLET | Refills: 0 | Status: SHIPPED | OUTPATIENT
Start: 2018-09-23 | End: 2018-10-22 | Stop reason: SDUPTHER

## 2018-09-21 RX ORDER — OXYCODONE AND ACETAMINOPHEN 7.5; 325 MG/1; MG/1
1 TABLET ORAL 4 TIMES DAILY PRN
Qty: 120 TABLET | Refills: 0 | Status: SHIPPED | OUTPATIENT
Start: 2018-09-23 | End: 2018-10-22 | Stop reason: SDUPTHER

## 2018-09-21 NOTE — PATIENT INSTRUCTIONS
TESTING:  -- none     REFERRAL:  -- referral to neurosurgery for evaluation of T9 compression fracture - PENDING     PREVENTION OF PAIN:   -- continue duloxetine 20mg daily     AS-NEEDED TREATMENT OF PAIN:  -- refill MS Contin 15mg daily #30 9/23/18 - 10/23/18 (call several days before you need a refill, ideally 10/18)  -- refill Percocet 7.5mg/325mg 4x per day #120  9/23/18 - 10/23/18   --continue Flexeril 0mg twice a day     Try BIOTENE mouthwash and other products for dry mouth (over the counter)

## 2018-09-21 NOTE — PROGRESS NOTES
Date of service: 9/21/2018  Referring provider: No ref. provider found    Subjective:      Chief complaint: Back Pain       Patient ID: Radha Ramirez is a 43 y.o. 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 In the interim she had developed some myoclonic twitching in the lower more than upper extremities which I suspected was probably from duloxetine so I asked her to stop and then resume at lower dose.     She reports that since the RFA her back pain has lessened but now she feels her bone pain more. She rates her relief as 50%. The pain continues to be in the lower back. Her pain score is 5 with a range of 4-10.    The twitching did lessen on the Cymbalta 20 mg.    She will see Dr. Cooper on 10/16/18 2 discussed the vertebroplasty or kyphoplasty    INTERVAL HISTORY - 8/17/18     She is status post bilateral lumbar medial branch block at L2-3-4-5 on 08/09/2018 and 08/16/2018 with 50% pain relief for approximately 12 hours after both blocks. During that time she was able to stand up and cook herself a meal which she is normally unable to do. She was less sedentary and able to stand more than normal and was able to grocery shop.  She would like to proceed with RFA of these nerves.    In the interim I received imaging records from [facility] specifically her MRI thoracic spine without contrast which shows that she has a severe T9 compression fracture of indeterminate age but we do know it is new since 04/13/2018 and was already seen on x-ray on 06/16/2018.  She does have mid back pain but the most severe of her pains in the lower spine.  She also has the chronic severe L1 compression fracture.    She continues  on her chronic opiate regimen with good relief no adverse effects and no Advair behaviors.  If her current pain score 5 with a range of 3-10.    INTERVAL HISTORY - 7/20/18    UDS done last visit 6/18/18 concordant with prescribed morphine, oxycodone and fentanyl given in ED day before    Today she reports she is about the same. Pain is in the middle and lower back, currently 6/10, range 4 to 10.     She reports she had new Xrays done showing new compression fractures with her PCP. She is pending an MRI. She will also be getting a bone density scan.    Current back pain is 6/10 with range of 4 to 10.     INTERVAL HISTORY - 6/18/18     The below problem (posterior interosseus neuropathy) has completely resolved and did so really a week later after onset. She has had no new weakness occur.     She currently follows for chronic pain management with neurologist Dr. Hunter Gayle in [facility] but requests to change to me to have closer to home provider. Her main pain is axial back pain with radiation down the legs to the level of the knees. Her current pain score is 8. She last filled her medications on 5/26. Her stable regimen is listed below. Her mother expresses concern as to whether anything else can be done to minimize her medications. She reports she cannot get ESIs due to her chronic steroid use. She reports she has never received lumbar facet blocks / RFAs.    Her pain is currently flared because she recently became dizzy in the setting of UTI, poor PO intake, and fell, on her sacrum. Was evaluated in the ED. XR showed chronic L1 fracture, age indeterminate but new since 4/3/18 T9 fracture, and possible fracture at the sacrococcygeal junction. She was discharged after having been given toradol and percocet in the ED.     ORIGINAL NEURO HISTORY - 4/5/18   Patient woke up yesterday 6am to use restroom, noticed left hand was weak. Went back to sleep woke up at 1030 am, still weak. Reports she cannot extend the  fingers. Wrsit ok. NO PAIN. There is no numbness or tingling at all. She slept on her back, with the left wrist extended and under her chin, but was never on her humerus. No previous episodes.      Incidentally found to have optic nerve thinning by eye doctor - undergoing visual fields for further diagnosis.      Takes MS contin, percocet, benadryl, flexeril before bed but all doses were typical. No alcohol before bed.     HOSPITAL COURSE:  4/5/18: no clinical changes. OT recommended outpatient therapy. MRI cervical done and normal. No pain.      #1 Left hand weakness - occurring after waking up after sleeping with left wrist extended supporting the chin. No numbness at all and no pain. Because this initially appeared to me to be involvement of 3 separate peripheral nerves (median, ulnar, radial) or C7-T1 roots and patient has bilateral babinski I recommend cervical spine imaging with contrast which was completely normal. 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 This single nerve explains all her motor weakness and as it is a pure motor nerve explains why she has no sensory loss. Her inflammatory markers are elevated but the sudden onset and complete lack of pain argue against a mononeuritis multilpex or an early parsonage reina as the diagnosis. Thus, I do think this is compressive, from a position she sustained while sleeping. I expect this to resolve or markedly improve within 3 months.     Current acute treatment:  -- MS Contin 15mg nightly  -- Percocet 7.5mg/325mg QID     Current prevention:  -- duloxetine 40 mg -  "tolerating well     Previously tried:   -- none    Procedural history:   -- bilateral lumbar medial branch block at L2-3-4-5 on 08/09/2018 and 08/16/2018 with 50% pain relief for approximately 12 hours after both blocks.      Review of patient's allergies indicates:  No Known Allergies  Current Outpatient Medications   Medication Sig Dispense Refill    calcitRIOL (ROCALTROL) 0.5 MCG Cap Take 0.5 mcg by mouth every Mon, Wed, Fri.      cyclobenzaprine (FLEXERIL) 10 MG tablet Take 1 tablet (10 mg total) by mouth 2 (two) times daily. 60 tablet 11    dicyclomine (BENTYL) 20 mg tablet Take 20 mg by mouth 4 (four) times daily as needed.      DULoxetine (CYMBALTA) 20 MG capsule Take 2 capsules (40 mg total) by mouth once daily. 60 capsule 11    ergocalciferol (VITAMIN D2) 50,000 unit Cap Take 50,000 Units by mouth every 7 days.      hydrocortisone (CORTEF) 20 MG Tab Take 10 mg by mouth once daily.      levothyroxine (SYNTHROID, LEVOTHROID) 175 MCG tablet Take 175 mcg by mouth once daily.      liothyronine (CYTOMEL) 5 MCG Tab Take 25 mcg by mouth once daily.      [START ON 9/23/2018] morphine (MS CONTIN) 15 MG 12 hr tablet Take 1 tablet (15 mg total) by mouth once daily. 30 tablet 0    [START ON 9/23/2018] oxyCODONE-acetaminophen (PERCOCET) 7.5-325 mg per tablet Take 1 tablet by mouth 4 (four) times daily as needed for Pain. 120 tablet 0    promethazine (PHENERGAN) 25 MG tablet Take 25 mg by mouth every 4 (four) hours.       No current facility-administered medications for this visit.        Past Medical History  Past Medical History:   Diagnosis Date    Adrenal insufficiency     Arthritis     "Bulging and herniated lumbar discs"     Chronic instability of knee     Encounter for blood transfusion     Humerus fracture     Panhypopituitarism     Pelvic fracture     Rib fracture     Thyroid disease        Past Surgical History  Past Surgical History:   Procedure Laterality Date    Block-nerve-medial " branch-lumbar- L2-3-4-5 Bilateral 8/9/2018    Performed by Kelly [facility]apati [facility] at [facility]    Block-nerve-medial branch-lumbar-L2-3-4-5 Bilateral 8/16/2018    [facility]    GASTRIC BYPASS  2003    INJECTION OF ANESTHETIC AGENT AROUND MEDIAL BRANCH NERVES INNERVATING LUMBAR FACET JOINT Bilateral 8/9/2018    Procedure: Block-nerve-medial branch-lumbar- L2-3-4-5;  Surgeon: Kelly Prajapati MD;  Location: [facility] OR;  Service: Anesthesiology;  Laterality: Bilateral;    INJECTION OF ANESTHETIC AGENT AROUND MEDIAL BRANCH NERVES INNERVATING LUMBAR FACET JOINT Bilateral 8/16/2018    Procedure: Block-nerve-medial branch-lumbar-L2-3-4-5;  Surgeon: Kelly Prajapati MD;  Location: [facility] OR;  Service: Anesthesiology;  Laterality: Bilateral;    RADIOFREQUENCY ABLATION- L2-3-4-5 Left 8/30/2018    Per[facility] by Kelly [facility]ati [facility] OR    RADIOFREQUENCY ABLATION-L2-3-4-5 Right 9/6/2018    [facility] by [facility] OR       Family History  History reviewed. No pertinent family history.    Social History  Social History     Socioeconomic History    Marital status: Single     Spouse name: Not on file    Number of children: Not on file    Years of education: Not on file    Highest education level: Not on file   Social Needs    Financial resource strain: Not on file    Food insecurity - worry: Not on file    Food insecurity - inability: Not on file    Transportation needs - medical: Not on file    Transportation needs - non-medical: Not on file   Occupational History    Not on file   Tobacco Use    Smoking status: Never Smoker    Smokeless tobacco: Never Used   Substance and Sexual Activity    Alcohol use: No    Drug use: No    Sexual activity: Not on file   Other Topics Concern    Not on file   Social History Narrative    Not on file        Review of Systems  14-point review of systems as follows:   No check adore indicates NEGATIVE response   Constitutional: [] weight loss, [] change  to appetite   Eyes: [] change in vision, [] double vision   Ears, nose, mouth, throat: [] frequent nose bleeds, [] ringing in the ears   Respiratory: [] cough, [] wheezing   Cardiovascular: [] chest pain, [] palpitations   Gastrointestinal: [] jaundice, [] nausea/vomiting   Genitourinary: [] incontinence, [] burning with urination   Hematologic/lymphatic: [x] easy bruising/bleeding, [] night sweats   Neurological: [] numbness, [] weakness   Endocrine: [] fatigue, [] heat/cold intolerance   Allergy/Immunologic: [] fevers, [] chills   Musculoskeletal: [] muscle pain, [x] joint pain   Psychiatric: [] thoughts of harming self/others, [] depression   Integumentary: [] rashes, [] sores that do not heal     Objective:        Vitals:    09/21/18 1340   Resp: 16     Body mass index is 22.51 kg/m².    General: Well developed, well nourished.  No acute distress.   HEENT: Atraumatic, normocephalic. Poor dentition.   Neck: Trachea midline  Cardiovascular: Vitals reviewed. Normal peripheral perfusion.   Pulmonary: No increased work of breathing.  Abdomen/GI: No guarding  Musculoskeletal: No obvious joint deformities, moves all extremities symmetrically and well.     LUMBAR SPINE:  INSPECTION: no lesions   ROM: normal flexion but limited extension and lateral rotation   MUSCLE SPASM: mild   PARASPINAL TENDERNESS: bilateral   FACET LOADING: bilateral left worse than right   SI JOINT TENDERNESS: neg  JI: neg  STRAIGHT LEG RAISE: neg  CROSSED STRAIGHT LEG RAISE: neg  HIP INTERNAL ROM: normal  HIP EXTERNAL ROM: normal   GT TENDERNESS:       Data Review:     I have personally reviewed the referring provider's notes, labs, & imaging made available to me today.     RADIOLOGY STUDIES:  I have personally reviewed the pertinent images performed.     7/24/18 MRI thoracic spine without contrast  1.  Severe T9 compression fracture of indeterminate age which is at least partially subacute with minimal bone edema.  2.  Severe L1 compression  fracture which appears chronic, with no associated bone marrow edema.  3.  Chronic T11 Schmorl's with no associated bone marrow edema.  4.  Mild multilevel degenerative disc disease  5.  There is no spinal or foraminal stenosis appreciated  6.  Cholelithiasis    07/24/2018 MRI lumbar spine without contrast  1.  Severe chronic L1 compression fracture with no bone marrow edema appreciated  2.  Mild degenerative disc disease and mild to moderate facet arthropathy predominantly lower spine    08/01/2018 NM HIDA scan  1.  No evidence of cystic or common duct obstruction  2.  Normal gallbladder function    6/8/15 MRI lumbar spine w/o contrast:  L1-2: minimal broad based disc protrusion, moderate facet arthropathy  L2-3: mild broad based disc protrusion L > R, annular tear, moderate facet arthropathy, mild stenosis left lateral recess (with mild compression of left L3 root)  L3-4: mild broad based disc protrusion, mild/moderate facet arthropathy   L4-5: mild broad based disc protrusion, moderate/severe facet arthropathy, mild lateral recess stenosis (with mild compression of L5 nerve roots)  L5-S1: transitional vertebra. Mild broad based disc protrusion R > L. Moderate facet arthropathy. Mild/mod compression of right lateral recess with compression of right S1 root.  Chronic L1 fracture   Chronic healed T11 fracture     Results for orders placed or performed during the hospital encounter of 04/03/18   MRI Brain W WO Contrast    Narrative     MRI BRAIN WITH AND WITHOUT CONTRAST    CPT: 58586    Clinical data:  Neurological deficit.  Left hand paralysis.    Comparison: CT head 04/03/2018    Technique: Multiplanar, multisequence MRI of the brain was performed before and after administration of contrast.     Findings:     The diffusion weighted imaging is negative for acute or subacute ischemia.  Gray-white matter differentiation appears within normal limits.  No extra-axial fluid collection, hydrocephalus, mass effect,  midline shift is identified.  There is no acute hemorrhage.  Visualized vascular flow voids appear intact.  Visualized orbits appear unremarkable.  Visualized paranasal sinuses and mastoid air cells appear clear.  No mass is identified.  No pathologic enhancement is noted.  There is demonstrated a shallow sella turcica with minimal pituitary tissue identified.    Impression       1. No acute intracranial abnormality.  2. No pathologic enhancement.      Electronically signed by: NITHIN HOYT MD  Date:     04/03/18  Time:    19:40    CT Head Without Contrast    Narrative    CT scan of the head without contrast    Clinical history: Weakness in the fingers of the left hand    [id number]    Findings: Multiple computerized images of the head were obtained in axial projection using thin slices.  No intravenous contrast was administered.  The study was viewed at soft tissue and bone window settings.  Reconstructions were done in coronal and sagittal planes.    No focal lesions are identified.  There is no evidence of ventricular dilatation, midline shift, or hemorrhage.  No territorial infarct was visualized.  The gray-white junction is maintained throughout.  No extracerebral fluid collection was demonstrated.  The bony calvarium appears intact.  No sinus or mastoid abnormality was noted.  The sella is shallow and very small which is of uncertain significance.  This might be better evaluated with an MRI scan.    Impression     There are no acute findings.  Note is made of the sella being shallow and somewhat small which is of uncertain significance.  An MRI scan may be helpful in further evaluation of this area.      Electronically signed by: RUTHIE MO MD  Date:     04/03/18  Time:    16:23        Lab Results   Component Value Date     (L) 06/17/2018    K 3.6 06/17/2018    MG 1.7 04/03/2018    CL 96 06/17/2018    CO2 27 06/17/2018    BUN 11 06/17/2018    CREATININE 0.74 06/17/2018    GLU 71 06/17/2018    HGBA1C  4.9 04/04/2018    AST 86 (H) 06/17/2018    ALT 46 (H) 06/17/2018    ALBUMIN 3.3 (L) 06/17/2018    PROT 6.4 06/17/2018    BILITOT 0.3 06/17/2018       Lab Results   Component Value Date    WBC 5.49 06/17/2018    HGB 11.1 (L) 06/17/2018    HCT 33.1 (L) 06/17/2018    MCV 85 06/17/2018     06/17/2018       Lab Results   Component Value Date    TSH 0.918 04/03/2018       Assessment & Plan:       Problem List Items Addressed This Visit        Neuro    Spondylosis of lumbar region without myelopathy or radiculopathy    Overview     Axial back pain more so that radicular radiation down to knees. 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 Paraspinals tender. SLR negative. SI joint non tender. + facet loading bilaterally. Pain most likely facetogenic given this exam pattern and worse pain with extension / roation.    Reviewed the records from her previous pain physician, patient had never been offered LMBBs before. She is bilateral lumbar medial branch block at L2-3-4-5 on 08/09/2018 and 08/16/2018 with 50% pain relief for approximately 12 hours after both blocks.  Proceeded with RFA of the same nerves on 8/30/18 (left) then 9/6/18 (right) with 50% relief of back pain    Continue duloxetine for chronic musculoskeletal pain at lower dose since 40mg caused diffuse myoclonus         Relevant Medications    morphine (MS CONTIN) 15 MG 12 hr tablet (Start on 9/23/2018)    oxyCODONE-acetaminophen (PERCOCET) 7.5-325 mg per tablet (Start on 9/23/2018)    Compression fracture of body of thoracic vertebra - Primary    Overview     Patient has known chronic T11 and L1 fractures but has a more recent T9 fracture sustained after April 2018.  She does have more upper back pain than previous and I am concerned this may be coming from the new T9 fracture so for this reason I  referred her to Neurosurgery for consideration of vertebroplasty or kyphoplasty and this visit is pending.             Psychiatric    Chronically on opiate therapy    Overview     Patient would like me to take over her chronic pain management. Reviewed her previous pain physician's notes which show good relief and no signs of adverse behavior. She has been on the same regimen for chronic low back pain for years. Her UDS was consistent with prescribed substances. Opiod risk tool shows she is low risk with score 1. Her MME is low at 60 MME daily.     Sign chronic opiate contract  Continue MS contin 30mg daily  Continue Percocet 7.5mg/325mg QID prn          Relevant Medications    morphine (MS CONTIN) 15 MG 12 hr tablet (Start on 9/23/2018)    oxyCODONE-acetaminophen (PERCOCET) 7.5-325 mg per tablet (Start on 9/23/2018)       Orthopedic    Chronic bilateral low back pain without sciatica    Overview     Combination of degenerative disc disease facet arthropathy chronic L1 compression fracture.         Relevant Medications    morphine (MS CONTIN) 15 MG 12 hr tablet (Start on 9/23/2018)    oxyCODONE-acetaminophen (PERCOCET) 7.5-325 mg per tablet (Start on 9/23/2018)                TESTING:  -- none     REFERRAL:  -- referral to neurosurgery for evaluation of T9 compression fracture - PENDING     PREVENTION OF PAIN:   -- continue duloxetine 20mg daily     AS-NEEDED TREATMENT OF PAIN:  -- refill MS Contin 15mg daily #30 9/23/18 - 10/23/18 (call several days before you need a refill, ideally 10/18)  -- refill Percocet 7.5mg/325mg 4x per day #120  9/23/18 - 10/23/18   --continue Flexeril 0mg twice a day     Try BIOTENE mouthwash and other products for dry mouth (over the counter)     Follow-up in about 4 months (around 1/21/2019).     Kelly Prajapati MD

## 2018-10-22 ENCOUNTER — TELEPHONE (OUTPATIENT)
Dept: PAIN MEDICINE | Facility: CLINIC | Age: 43
End: 2018-10-22

## 2018-10-22 DIAGNOSIS — Z79.891 CHRONICALLY ON OPIATE THERAPY: ICD-10-CM

## 2018-10-22 DIAGNOSIS — G89.29 CHRONIC BILATERAL LOW BACK PAIN WITHOUT SCIATICA: ICD-10-CM

## 2018-10-22 DIAGNOSIS — M54.50 CHRONIC BILATERAL LOW BACK PAIN WITHOUT SCIATICA: ICD-10-CM

## 2018-10-22 NOTE — TELEPHONE ENCOUNTER
----- Message from Anna Contreras sent at 10/22/2018  8:46 AM CDT -----  Contact: patient, [phone]  Refill request  oxyCODONE-acetaminophen (PERCOCET) 7.5-325 mg per tablet  morphine (MS CONTIN) 15 MG 12 hr tablet    [facility]/pharmacy #[id number] - [place], [place] - [address]  [place] [address]  Phone: [phone] Fax: [phone]

## 2018-10-22 NOTE — TELEPHONE ENCOUNTER
----- Message from Lina Briggs sent at 10/22/2018  2:45 PM CDT -----  Contact: Patient  Patient is asking if a refill can be called in for oxyCODONE-acetaminophen (PERCOCET) 7.5-325 mg per tablet 1   20 tablet     [facility]/pharmacy #[id number] - [place], [place] - [address] [address]  Phone: [phone] Fax: [phone].  Thank you

## 2018-10-22 NOTE — TELEPHONE ENCOUNTER
Returned call and spoke with patient. Patient said medication refill was received by her pharmacy.

## 2018-10-23 ENCOUNTER — PATIENT MESSAGE (OUTPATIENT)
Dept: NEUROLOGY | Facility: CLINIC | Age: 43
End: 2018-10-23

## 2018-10-23 RX ORDER — OXYCODONE AND ACETAMINOPHEN 7.5; 325 MG/1; MG/1
1 TABLET ORAL 4 TIMES DAILY PRN
Qty: 120 TABLET | Refills: 0 | Status: SHIPPED | OUTPATIENT
Start: 2018-10-23 | End: 2018-11-19 | Stop reason: SDUPTHER

## 2018-10-23 RX ORDER — MORPHINE SULFATE 15 MG/1
15 TABLET, FILM COATED, EXTENDED RELEASE ORAL DAILY
Qty: 30 TABLET | Refills: 0 | Status: SHIPPED | OUTPATIENT
Start: 2018-10-23 | End: 2018-11-19 | Stop reason: SDUPTHER

## 2018-11-19 ENCOUNTER — PATIENT MESSAGE (OUTPATIENT)
Dept: NEUROLOGY | Facility: CLINIC | Age: 43
End: 2018-11-19

## 2018-11-19 DIAGNOSIS — Z79.891 CHRONICALLY ON OPIATE THERAPY: ICD-10-CM

## 2018-11-19 DIAGNOSIS — M54.50 CHRONIC BILATERAL LOW BACK PAIN WITHOUT SCIATICA: ICD-10-CM

## 2018-11-19 DIAGNOSIS — G89.29 CHRONIC BILATERAL LOW BACK PAIN WITHOUT SCIATICA: ICD-10-CM

## 2018-11-20 RX ORDER — OXYCODONE AND ACETAMINOPHEN 7.5; 325 MG/1; MG/1
1 TABLET ORAL 4 TIMES DAILY PRN
Qty: 120 TABLET | Refills: 0 | Status: SHIPPED | OUTPATIENT
Start: 2018-11-21 | End: 2018-11-21 | Stop reason: SDUPTHER

## 2018-11-20 RX ORDER — MORPHINE SULFATE 15 MG/1
15 TABLET, FILM COATED, EXTENDED RELEASE ORAL DAILY
Qty: 30 TABLET | Refills: 0 | Status: SHIPPED | OUTPATIENT
Start: 2018-11-21 | End: 2018-12-20 | Stop reason: SDUPTHER

## 2018-11-21 ENCOUNTER — PATIENT MESSAGE (OUTPATIENT)
Dept: NEUROLOGY | Facility: CLINIC | Age: 43
End: 2018-11-21

## 2018-11-21 DIAGNOSIS — M54.50 CHRONIC BILATERAL LOW BACK PAIN WITHOUT SCIATICA: ICD-10-CM

## 2018-11-21 DIAGNOSIS — Z79.891 CHRONICALLY ON OPIATE THERAPY: ICD-10-CM

## 2018-11-21 DIAGNOSIS — G89.29 CHRONIC BILATERAL LOW BACK PAIN WITHOUT SCIATICA: ICD-10-CM

## 2018-11-21 RX ORDER — OXYCODONE AND ACETAMINOPHEN 7.5; 325 MG/1; MG/1
1 TABLET ORAL 4 TIMES DAILY PRN
Qty: 120 TABLET | Refills: 0 | Status: SHIPPED | OUTPATIENT
Start: 2018-11-21 | End: 2018-12-20 | Stop reason: SDUPTHER

## 2018-11-21 NOTE — TELEPHONE ENCOUNTER
Please see message from patient. I called [facility] and they do not have it and will not be in Monday. Patient would like it resent to [facility]. Please advise.

## 2018-12-20 ENCOUNTER — PATIENT MESSAGE (OUTPATIENT)
Dept: NEUROLOGY | Facility: CLINIC | Age: 43
End: 2018-12-20

## 2018-12-20 DIAGNOSIS — G89.29 CHRONIC BILATERAL LOW BACK PAIN WITHOUT SCIATICA: ICD-10-CM

## 2018-12-20 DIAGNOSIS — M54.50 CHRONIC BILATERAL LOW BACK PAIN WITHOUT SCIATICA: ICD-10-CM

## 2018-12-20 DIAGNOSIS — Z79.891 CHRONICALLY ON OPIATE THERAPY: ICD-10-CM

## 2018-12-20 RX ORDER — OXYCODONE AND ACETAMINOPHEN 7.5; 325 MG/1; MG/1
1 TABLET ORAL 4 TIMES DAILY PRN
Qty: 120 TABLET | Refills: 0 | Status: SHIPPED | OUTPATIENT
Start: 2018-12-21 | End: 2019-01-17 | Stop reason: SDUPTHER

## 2018-12-20 RX ORDER — MORPHINE SULFATE 15 MG/1
15 TABLET, FILM COATED, EXTENDED RELEASE ORAL DAILY
Qty: 30 TABLET | Refills: 0 | Status: SHIPPED | OUTPATIENT
Start: 2018-12-21 | End: 2019-01-17 | Stop reason: SDUPTHER

## 2019-01-17 DIAGNOSIS — G89.29 CHRONIC BILATERAL LOW BACK PAIN WITHOUT SCIATICA: ICD-10-CM

## 2019-01-17 DIAGNOSIS — M54.50 CHRONIC BILATERAL LOW BACK PAIN WITHOUT SCIATICA: ICD-10-CM

## 2019-01-17 DIAGNOSIS — Z79.891 CHRONICALLY ON OPIATE THERAPY: ICD-10-CM

## 2019-01-17 RX ORDER — OXYCODONE AND ACETAMINOPHEN 7.5; 325 MG/1; MG/1
1 TABLET ORAL 4 TIMES DAILY PRN
Qty: 120 TABLET | Refills: 0 | Status: SHIPPED | OUTPATIENT
Start: 2019-01-20 | End: 2019-01-21 | Stop reason: SDUPTHER

## 2019-01-17 RX ORDER — OXYCODONE AND ACETAMINOPHEN 7.5; 325 MG/1; MG/1
1 TABLET ORAL 4 TIMES DAILY PRN
Qty: 120 TABLET | Refills: 0 | OUTPATIENT
Start: 2019-01-20 | End: 2019-02-19

## 2019-01-17 RX ORDER — MORPHINE SULFATE 15 MG/1
15 TABLET, FILM COATED, EXTENDED RELEASE ORAL DAILY
Qty: 30 TABLET | Refills: 0 | OUTPATIENT
Start: 2019-01-20 | End: 2019-02-19

## 2019-01-17 RX ORDER — MORPHINE SULFATE 15 MG/1
15 TABLET, FILM COATED, EXTENDED RELEASE ORAL DAILY
Qty: 30 TABLET | Refills: 0 | Status: SHIPPED | OUTPATIENT
Start: 2019-01-20 | End: 2019-02-15 | Stop reason: SDUPTHER

## 2019-01-21 ENCOUNTER — PATIENT MESSAGE (OUTPATIENT)
Dept: NEUROLOGY | Facility: CLINIC | Age: 44
End: 2019-01-21

## 2019-01-21 DIAGNOSIS — Z79.891 CHRONICALLY ON OPIATE THERAPY: ICD-10-CM

## 2019-01-21 DIAGNOSIS — M54.50 CHRONIC BILATERAL LOW BACK PAIN WITHOUT SCIATICA: ICD-10-CM

## 2019-01-21 DIAGNOSIS — G89.29 CHRONIC BILATERAL LOW BACK PAIN WITHOUT SCIATICA: ICD-10-CM

## 2019-01-21 RX ORDER — OXYCODONE AND ACETAMINOPHEN 7.5; 325 MG/1; MG/1
1 TABLET ORAL 4 TIMES DAILY PRN
Qty: 120 TABLET | Refills: 0 | Status: SHIPPED | OUTPATIENT
Start: 2019-01-21 | End: 2019-01-22 | Stop reason: SDUPTHER

## 2019-01-22 ENCOUNTER — PATIENT MESSAGE (OUTPATIENT)
Dept: NEUROLOGY | Facility: CLINIC | Age: 44
End: 2019-01-22

## 2019-01-22 DIAGNOSIS — G89.29 CHRONIC BILATERAL LOW BACK PAIN WITHOUT SCIATICA: ICD-10-CM

## 2019-01-22 DIAGNOSIS — M54.50 CHRONIC BILATERAL LOW BACK PAIN WITHOUT SCIATICA: ICD-10-CM

## 2019-01-22 DIAGNOSIS — Z79.891 CHRONICALLY ON OPIATE THERAPY: ICD-10-CM

## 2019-01-22 RX ORDER — OXYCODONE AND ACETAMINOPHEN 7.5; 325 MG/1; MG/1
1 TABLET ORAL 4 TIMES DAILY PRN
Qty: 120 TABLET | Refills: 0 | Status: SHIPPED | OUTPATIENT
Start: 2019-01-22 | End: 2019-02-15 | Stop reason: SDUPTHER

## 2019-01-23 ENCOUNTER — TELEPHONE (OUTPATIENT)
Dept: NEUROLOGY | Facility: CLINIC | Age: 44
End: 2019-01-23

## 2019-01-24 ENCOUNTER — TELEPHONE (OUTPATIENT)
Dept: NEUROLOGY | Facility: CLINIC | Age: 44
End: 2019-01-24

## 2019-01-24 ENCOUNTER — PATIENT MESSAGE (OUTPATIENT)
Dept: NEUROLOGY | Facility: CLINIC | Age: 44
End: 2019-01-24

## 2019-01-24 NOTE — TELEPHONE ENCOUNTER
Prior authorization for the medication Morphine completed through [facility].

## 2019-01-28 ENCOUNTER — PATIENT MESSAGE (OUTPATIENT)
Dept: NEUROLOGY | Facility: CLINIC | Age: 44
End: 2019-01-28

## 2019-02-14 ENCOUNTER — PATIENT MESSAGE (OUTPATIENT)
Dept: NEUROLOGY | Facility: CLINIC | Age: 44
End: 2019-02-14

## 2019-02-14 DIAGNOSIS — G89.29 CHRONIC BILATERAL LOW BACK PAIN WITHOUT SCIATICA: ICD-10-CM

## 2019-02-14 DIAGNOSIS — Z79.891 CHRONICALLY ON OPIATE THERAPY: ICD-10-CM

## 2019-02-14 DIAGNOSIS — M54.50 CHRONIC BILATERAL LOW BACK PAIN WITHOUT SCIATICA: ICD-10-CM

## 2019-02-15 RX ORDER — MORPHINE SULFATE 15 MG/1
15 TABLET, FILM COATED, EXTENDED RELEASE ORAL DAILY
Qty: 30 TABLET | Refills: 0 | Status: SHIPPED | OUTPATIENT
Start: 2019-02-19 | End: 2019-02-28 | Stop reason: SDUPTHER

## 2019-02-15 RX ORDER — OXYCODONE AND ACETAMINOPHEN 7.5; 325 MG/1; MG/1
1 TABLET ORAL 4 TIMES DAILY PRN
Qty: 120 TABLET | Refills: 0 | Status: SHIPPED | OUTPATIENT
Start: 2019-02-19 | End: 2019-02-28 | Stop reason: SDUPTHER

## 2019-02-16 ENCOUNTER — PATIENT MESSAGE (OUTPATIENT)
Dept: NEUROLOGY | Facility: CLINIC | Age: 44
End: 2019-02-16

## 2019-02-28 ENCOUNTER — OFFICE VISIT (OUTPATIENT)
Dept: NEUROLOGY | Facility: CLINIC | Age: 44
End: 2019-02-28
Payer: MEDICARE

## 2019-02-28 VITALS
WEIGHT: 130.94 LBS | DIASTOLIC BLOOD PRESSURE: 74 MMHG | HEART RATE: 109 BPM | SYSTOLIC BLOOD PRESSURE: 113 MMHG | RESPIRATION RATE: 16 BRPM | BODY MASS INDEX: 23.2 KG/M2 | HEIGHT: 63 IN

## 2019-02-28 DIAGNOSIS — M54.50 CHRONIC BILATERAL LOW BACK PAIN WITHOUT SCIATICA: ICD-10-CM

## 2019-02-28 DIAGNOSIS — S22.000A COMPRESSION FRACTURE OF BODY OF THORACIC VERTEBRA: ICD-10-CM

## 2019-02-28 DIAGNOSIS — G89.29 CHRONIC BILATERAL LOW BACK PAIN WITHOUT SCIATICA: ICD-10-CM

## 2019-02-28 DIAGNOSIS — M47.816 SPONDYLOSIS OF LUMBAR REGION WITHOUT MYELOPATHY OR RADICULOPATHY: ICD-10-CM

## 2019-02-28 DIAGNOSIS — Z79.891 CHRONICALLY ON OPIATE THERAPY: Primary | ICD-10-CM

## 2019-02-28 PROCEDURE — 3008F PR BODY MASS INDEX (BMI) DOCUMENTED: ICD-10-PCS | Mod: CPTII,S$GLB,, | Performed by: PSYCHIATRY & NEUROLOGY

## 2019-02-28 PROCEDURE — 3008F BODY MASS INDEX DOCD: CPT | Mod: CPTII,S$GLB,, | Performed by: PSYCHIATRY & NEUROLOGY

## 2019-02-28 PROCEDURE — 99214 PR OFFICE/OUTPT VISIT, EST, LEVL IV, 30-39 MIN: ICD-10-PCS | Mod: S$GLB,,, | Performed by: PSYCHIATRY & NEUROLOGY

## 2019-02-28 PROCEDURE — 99999 PR PBB SHADOW E&M-EST. PATIENT-LVL III: CPT | Mod: PBBFAC,,, | Performed by: PSYCHIATRY & NEUROLOGY

## 2019-02-28 PROCEDURE — 99214 OFFICE O/P EST MOD 30 MIN: CPT | Mod: S$GLB,,, | Performed by: PSYCHIATRY & NEUROLOGY

## 2019-02-28 PROCEDURE — 99999 PR PBB SHADOW E&M-EST. PATIENT-LVL III: ICD-10-PCS | Mod: PBBFAC,,, | Performed by: PSYCHIATRY & NEUROLOGY

## 2019-02-28 RX ORDER — OXYCODONE AND ACETAMINOPHEN 7.5; 325 MG/1; MG/1
1 TABLET ORAL EVERY 6 HOURS PRN
Qty: 120 TABLET | Refills: 0 | Status: SHIPPED | OUTPATIENT
Start: 2019-04-20 | End: 2019-05-08 | Stop reason: SDUPTHER

## 2019-02-28 RX ORDER — MORPHINE SULFATE 15 MG/1
15 TABLET, FILM COATED, EXTENDED RELEASE ORAL DAILY
Qty: 30 TABLET | Refills: 0 | Status: SHIPPED | OUTPATIENT
Start: 2019-03-21 | End: 2019-04-20

## 2019-02-28 RX ORDER — MORPHINE SULFATE 15 MG/1
15 TABLET, FILM COATED, EXTENDED RELEASE ORAL NIGHTLY
Qty: 30 TABLET | Refills: 0 | Status: SHIPPED | OUTPATIENT
Start: 2019-04-20 | End: 2019-05-08 | Stop reason: SDUPTHER

## 2019-02-28 RX ORDER — OXYCODONE AND ACETAMINOPHEN 7.5; 325 MG/1; MG/1
1 TABLET ORAL 4 TIMES DAILY PRN
Qty: 120 TABLET | Refills: 0 | Status: SHIPPED | OUTPATIENT
Start: 2019-03-21 | End: 2019-04-20

## 2019-02-28 NOTE — PATIENT INSTRUCTIONS
TESTING:  -- none     REFERRAL:  -- none    PREVENTION OF PAIN:   -- continue duloxetine 20mg daily - take in the MORNING to help with insomnia     AS-NEEDED TREATMENT OF PAIN:  -- refill MS Contin 15mg daily #30, 3/21 to 4/20/19 and 4/20/19 to 5/20/19   -- refill Percocet 7.5mg/325mg 4x per day #120,  3/21 to 4/20/19 and 4/20/19 to 5/20/19   -- continue Flexeril 10mg twice a day

## 2019-02-28 NOTE — PROGRESS NOTES
Date of service: 2/28/2019  Referring provider: No ref. provider found    Subjective:      Chief complaint: Lumbar Spine Pain (L-Spine)       Patient ID: Radha Ramirez is a 43 y.o. 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  The lumbar facet mediated pain continues to be fine.  She continues to experience bone pain in the middle of her back to the compression fracture.  She has not consulted with Neurosurgery due to chickening out.  Her chronic opiate therapy continues to be effective.  She has intermittent constipation that she is managing with MiraLax.  Duloxetine 20 mg is effective for pain. She takes it at night.  She does noted makes her a little bit jittery.  She has been having insomnia related to health issues with her mom.  She initially thought this was Percocet interfering with her sleep and she taper down but her sleep did not improve. Her current pain score is four with a range 4 to 9.  Sometimes the left hand feels weak with grasp.    INTERVAL HISTORY - 9/21/18    She is status post lumbar medial branch nerve RFA at L2-3-4-5 on 8/30/18 (left) then 9/6/18 (right). In the interim she had developed some myoclonic twitching in the lower more than upper extremities which I suspected was probably from duloxetine so I asked her to stop and then resume at lower dose.     She reports that since the RFA her back pain has lessened but now she feels her bone pain more. She rates her relief as 50%. The pain continues to be in the lower back. Her pain score is 5 with a range of 4-10.    The twitching did lessen on the Cymbalta 20 mg.    She will see Dr. Cooper on 10/16/18 2 discussed the vertebroplasty or kyphoplasty    INTERVAL HISTORY - 8/17/18     She is status post bilateral lumbar  medial branch block at L2-3-4-5 on 08/09/2018 and 08/16/2018 with 50% pain relief for approximately 12 hours after both blocks. During that time she was able to stand up and cook herself a meal which she is normally unable to do. She was less sedentary and able to stand more than normal and was able to grocery shop.  She would like to proceed with RFA of these nerves.    In the interim I received imaging records from [facility] specifically her MRI thoracic spine without contrast which shows that she has a severe T9 compression fracture of indeterminate age but we do know it is new since 04/13/2018 and was already seen on x-ray on 06/16/2018.  She does have mid back pain but the most severe of her pains in the lower spine.  She also has the chronic severe L1 compression fracture.    She continues on her chronic opiate regimen with good relief no adverse effects and no Advair behaviors.  If her current pain score 5 with a range of 3-10.    INTERVAL HISTORY - 7/20/18    UDS done last visit 6/18/18 concordant with prescribed morphine, oxycodone and fentanyl given in ED day before    Today she reports she is about the same. Pain is in the middle and lower back, currently 6/10, range 4 to 10.     She reports she had new Xrays done showing new compression fractures with her PCP. She is pending an MRI. She will also be getting a bone density scan.    Current back pain is 6/10 with range of 4 to 10.     INTERVAL HISTORY - 6/18/18     The below problem (posterior interosseus neuropathy) has completely resolved and did so really a week later after onset. She has had no new weakness occur.     She currently follows for chronic pain management with neurologist Dr. Hunter Gayle in [facility] but requests to change to me to have closer to home provider. Her main pain is axial back pain with radiation down the legs to the level of the knees. Her current pain score is 8. She last filled her medications on 5/26. Her  stable regimen is listed below. Her mother expresses concern as to whether anything else can be done to minimize her medications. She reports she cannot get ESIs due to her chronic steroid use. She reports she has never received lumbar facet blocks / RFAs.    Her pain is currently flared because she recently became dizzy in the setting of UTI, poor PO intake, and fell, on her sacrum. Was evaluated in the ED. XR showed chronic L1 fracture, age indeterminate but new since 4/3/18 T9 fracture, and possible fracture at the sacrococcygeal junction. She was discharged after having been given toradol and percocet in the ED.     ORIGINAL NEURO HISTORY - 4/5/18   Patient woke up yesterday 6am to use restroom, noticed left hand was weak. Went back to sleep woke up at 1030 am, still weak. Reports she cannot extend the fingers. Wrsit ok. NO PAIN. There is no numbness or tingling at all. She slept on her back, with the left wrist extended and under her chin, but was never on her humerus. No previous episodes.      Incidentally found to have optic nerve thinning by eye doctor - undergoing visual fields for further diagnosis.      Takes MS contin, percocet, benadryl, flexeril before bed but all doses were typical. No alcohol before bed.     HOSPITAL COURSE:  4/5/18: no clinical changes. OT recommended outpatient therapy. MRI cervical done and normal. No pain.      #1 Left hand weakness - occurring after waking up after sleeping with left wrist extended supporting the chin. No numbness at all and no pain. Because this initially appeared to me to be involvement of 3 separate peripheral nerves (median, ulnar, radial) or C7-T1 roots and patient has bilateral babinski I recommend cervical spine imaging with contrast which was completely normal. 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 This single nerve explains all her motor weakness and as it is a pure motor nerve explains why she has no sensory loss. Her inflammatory markers are elevated but the sudden onset and complete lack of pain argue against a mononeuritis multilpex or an early parsonage reina as the diagnosis. Thus, I do think this is compressive, from a position she sustained while sleeping. I expect this to resolve or markedly improve within 3 months.     Current acute treatment:  -- MS Contin 15mg nightly  -- Percocet 7.5mg/325mg QID     Current prevention:  -- duloxetine 20 mg - some jitteriness    Previously tried:   -- none    Procedural history:   -- bilateral lumbar medial branch block at L2-3-4-5 on 08/09/2018 and 08/16/2018 with 50% pain relief for approximately 12 hours after both blocks.      Review of patient's allergies indicates:  No Known Allergies  Current Outpatient Medications   Medication Sig Dispense Refill    calcitRIOL (ROCALTROL) 0.5 MCG Cap Take 0.5 mcg by mouth every Mon, Wed, Fri.      cyclobenzaprine (FLEXERIL) 10 MG tablet Take 1 tablet (10 mg total) by mouth 2 (two) times daily. 60 tablet 11    dicyclomine (BENTYL) 20 mg tablet Take 20 mg by mouth 4 (four) times daily as needed.      DULoxetine (CYMBALTA) 20 MG capsule Take 2 capsules (40 mg total) by mouth once daily. 60 capsule 11    ergocalciferol (VITAMIN D2) 50,000 unit Cap Take 50,000 Units by mouth every 7 days.      hydrocortisone (CORTEF) 20 MG Tab Take 10 mg by mouth once daily.      levothyroxine (SYNTHROID, LEVOTHROID) 175 MCG tablet Take 175 mcg by mouth once daily.      liothyronine (CYTOMEL) 5 MCG Tab Take 25 mcg by mouth once daily.      [START ON 3/21/2019] morphine (MS CONTIN) 15 MG 12  "hr tablet Take 1 tablet (15 mg total) by mouth once daily. 30 tablet 0    [START ON 3/21/2019] oxyCODONE-acetaminophen (PERCOCET) 7.5-325 mg per tablet Take 1 tablet by mouth 4 (four) times daily as needed for Pain. 120 tablet 0    promethazine (PHENERGAN) 25 MG tablet Take 25 mg by mouth every 4 (four) hours.      [START ON 4/20/2019] morphine (MS CONTIN) 15 MG 12 hr tablet Take 1 tablet (15 mg total) by mouth every evening. 30 tablet 0    [START ON 4/20/2019] oxyCODONE-acetaminophen (PERCOCET) 7.5-325 mg per tablet Take 1 tablet by mouth every 6 (six) hours as needed for Pain. 120 tablet 0     No current facility-administered medications for this visit.        Past Medical History  Past Medical History:   Diagnosis Date    Adrenal insufficiency     Arthritis     "Bulging and herniated lumbar discs"     Chronic instability of knee     Encounter for blood transfusion     Humerus fracture     Panhypopituitarism     Pelvic fracture     Rib fracture     Thyroid disease        Past Surgical History  Past Surgical History:   Procedure Laterality Date    Block-nerve-medial branch-lumbar- L2-3-4-5 Bilateral 8/9/2018    Performed [facility] at [facility]    Block-nerve-medial branch-lumbar-L2-3-4-5 Bilateral 8/16/2018    Per[facility] at [facility] OR    GASTRIC BYPASS  2003    RADIOFREQUENCY ABLATION- L2-3-4-5 Left 8/30/2018    Performed [facility] at [facility] OR    RADIOFREQUENCY ABLATION-L2-3-4-5 Right 9/6/2018    Performed by [facility] at [facility] OR       Family History  Family History   Problem Relation Age of Onset    Atrial fibrillation Mother        Social History  Social History     Socioeconomic History    Marital status: Single     Spouse name: Not on file    Number of children: Not on file    Years of education: Not on file    Highest education level: Not on file   Social Needs    Financial resource strain: Not on file    Food insecurity - worry: Not on file    " Food insecurity - inability: Not on file    Transportation needs - medical: Not on file    Transportation needs - non-medical: Not on file   Occupational History    Not on file   Tobacco Use    Smoking status: Never Smoker    Smokeless tobacco: Never Used   Substance and Sexual Activity    Alcohol use: No    Drug use: No    Sexual activity: Not on file   Other Topics Concern    Not on file   Social History Narrative    Not on file        Review of Systems  14-point review of systems as follows:   No check adore indicates NEGATIVE response   Constitutional: [] weight loss, [] change to appetite   Eyes: [] change in vision, [] double vision   Ears, nose, mouth, throat: [] frequent nose bleeds, [] ringing in the ears   Respiratory: [] cough, [] wheezing   Cardiovascular: [] chest pain, [] palpitations   Gastrointestinal: [] jaundice, [] nausea/vomiting   Genitourinary: [] incontinence, [] burning with urination   Hematologic/lymphatic: [] easy bruising/bleeding, [] night sweats   Neurological: [] numbness, [x] weakness   Endocrine: [] fatigue, [] heat/cold intolerance   Allergy/Immunologic: [] fevers, [] chills   Musculoskeletal: [] muscle pain, [] joint pain   Psychiatric: [] thoughts of harming self/others, [] depression   Integumentary: [] rashes, [] sores that do not heal     Objective:        Vitals:    02/28/19 1403   BP: 113/74   Pulse: 109   Resp: 16     Body mass index is 23.2 kg/m².    Previous -   General: Well developed, well nourished.  No acute distress.   HEENT: Atraumatic, normocephalic. Poor dentition.   Neck: Trachea midline  Cardiovascular: Vitals reviewed. Normal peripheral perfusion.   Pulmonary: No increased work of breathing.  Abdomen/GI: No guarding  Musculoskeletal: No obvious joint deformities, moves all extremities symmetrically and well.     LUMBAR SPINE:  INSPECTION: no lesions   ROM: normal flexion but limited extension and lateral rotation   MUSCLE SPASM: mild   PARASPINAL  TENDERNESS: bilateral   FACET LOADING: bilateral left worse than right   SI JOINT TENDERNESS: neg  JI: neg  STRAIGHT LEG RAISE: neg  CROSSED STRAIGHT LEG RAISE: neg  HIP INTERNAL ROM: normal  HIP EXTERNAL ROM: normal   GT TENDERNESS:       Data Review:     I have personally reviewed the referring provider's notes, labs, & imaging made available to me today.     RADIOLOGY STUDIES:  I have personally reviewed the pertinent images performed.     7/24/18 MRI thoracic spine without contrast  1.  Severe T9 compression fracture of indeterminate age which is at least partially subacute with minimal bone edema.  2.  Severe L1 compression fracture which appears chronic, with no associated bone marrow edema.  3.  Chronic T11 Schmorl's with no associated bone marrow edema.  4.  Mild multilevel degenerative disc disease  5.  There is no spinal or foraminal stenosis appreciated  6.  Cholelithiasis    07/24/2018 MRI lumbar spine without contrast  1.  Severe chronic L1 compression fracture with no bone marrow edema appreciated  2.  Mild degenerative disc disease and mild to moderate facet arthropathy predominantly lower spine    08/01/2018 NM HIDA scan  1.  No evidence of cystic or common duct obstruction  2.  Normal gallbladder function    6/8/15 MRI lumbar spine w/o contrast:  L1-2: minimal broad based disc protrusion, moderate facet arthropathy  L2-3: mild broad based disc protrusion L > R, annular tear, moderate facet arthropathy, mild stenosis left lateral recess (with mild compression of left L3 root)  L3-4: mild broad based disc protrusion, mild/moderate facet arthropathy   L4-5: mild broad based disc protrusion, moderate/severe facet arthropathy, mild lateral recess stenosis (with mild compression of L5 nerve roots)  L5-S1: transitional vertebra. Mild broad based disc protrusion R > L. Moderate facet arthropathy. Mild/mod compression of right lateral recess with compression of right S1 root.  Chronic L1 fracture    Chronic healed T11 fracture     Results for orders placed or performed during the hospital encounter of 04/03/18   MRI Brain W WO Contrast    Narrative     MRI BRAIN WITH AND WITHOUT CONTRAST    CPT: 02135    Clinical data:  Neurological deficit.  Left hand paralysis.    Comparison: CT head 04/03/2018    Technique: Multiplanar, multisequence MRI of the brain was performed before and after administration of contrast.     Findings:     The diffusion weighted imaging is negative for acute or subacute ischemia.  Gray-white matter differentiation appears within normal limits.  No extra-axial fluid collection, hydrocephalus, mass effect, midline shift is identified.  There is no acute hemorrhage.  Visualized vascular flow voids appear intact.  Visualized orbits appear unremarkable.  Visualized paranasal sinuses and mastoid air cells appear clear.  No mass is identified.  No pathologic enhancement is noted.  There is demonstrated a shallow sella turcica with minimal pituitary tissue identified.    Impression       1. No acute intracranial abnormality.  2. No pathologic enhancement.      Electronically signed by: NITHIN HOYT MD  Date:     04/03/18  Time:    19:40    CT Head Without Contrast    Narrative    CT scan of the head without contrast    Clinical history: Weakness in the fingers of the left hand    [id number]    Findings: Multiple computerized images of the head were obtained in axial projection using thin slices.  No intravenous contrast was administered.  The study was viewed at soft tissue and bone window settings.  Reconstructions were done in coronal and sagittal planes.    No focal lesions are identified.  There is no evidence of ventricular dilatation, midline shift, or hemorrhage.  No territorial infarct was visualized.  The gray-white junction is maintained throughout.  No extracerebral fluid collection was demonstrated.  The bony calvarium appears intact.  No sinus or mastoid abnormality was noted.   The sella is shallow and very small which is of uncertain significance.  This might be better evaluated with an MRI scan.    Impression     There are no acute findings.  Note is made of the sella being shallow and somewhat small which is of uncertain significance.  An MRI scan may be helpful in further evaluation of this area.      Electronically signed by: RUTHIE MO MD  Date:     04/03/18  Time:    16:23        Lab Results   Component Value Date     (L) 06/17/2018    K 3.6 06/17/2018    MG 1.7 04/03/2018    CL 96 06/17/2018    CO2 27 06/17/2018    BUN 11 06/17/2018    CREATININE 0.74 06/17/2018    GLU 71 06/17/2018    HGBA1C 4.9 04/04/2018    AST 86 (H) 06/17/2018    ALT 46 (H) 06/17/2018    ALBUMIN 3.3 (L) 06/17/2018    PROT 6.4 06/17/2018    BILITOT 0.3 06/17/2018       Lab Results   Component Value Date    WBC 5.49 06/17/2018    HGB 11.1 (L) 06/17/2018    HCT 33.1 (L) 06/17/2018    MCV 85 06/17/2018     06/17/2018       Lab Results   Component Value Date    TSH 0.918 04/03/2018       Assessment & Plan:       Problem List Items Addressed This Visit        Neuro    Spondylosis of lumbar region without myelopathy or radiculopathy    Overview     Axial back pain more so that radicular radiation down to knees. 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 Paraspinals tender. SLR negative. SI joint non tender. + facet loading bilaterally. Pain most likely facetogenic given this exam pattern and worse pain with extension / roation.    Reviewed the records from her previous pain physician, patient had never been offered LMBBs before. She is bilateral lumbar medial branch block at L2-3-4-5 on 08/09/2018 and 08/16/2018 with 50% pain relief for approximately 12 hours after both blocks.  Proceeded with RFA of the same nerves on 8/30/18 (left) then  9/6/18 (right) with 50% relief of back pain    Continue duloxetine for chronic musculoskeletal pain at lower dose since 40mg caused diffuse myoclonus         Relevant Medications    oxyCODONE-acetaminophen (PERCOCET) 7.5-325 mg per tablet (Start on 3/21/2019)    morphine (MS CONTIN) 15 MG 12 hr tablet (Start on 3/21/2019)    oxyCODONE-acetaminophen (PERCOCET) 7.5-325 mg per tablet (Start on 4/20/2019)    morphine (MS CONTIN) 15 MG 12 hr tablet (Start on 4/20/2019)    Compression fracture of body of thoracic vertebra    Overview     Patient has known chronic T11 and L1 fractures but has a more recent T9 fracture sustained after April 2018.  She does have more upper back pain than previous and I am concerned this may be coming from the new T9 fracture so for this reason I referred her to Neurosurgery but she declined this visit.            Psychiatric    Chronically on opiate therapy - Primary    Overview     Patient would like me to take over her chronic pain management. Reviewed her previous pain physician's notes which show good relief and no signs of adverse behavior. She has been on the same regimen for chronic low back pain for years. Her UDS was consistent with prescribed substances. Opiod risk tool shows she is low risk with score 1. Her MME is low at 60 MME daily.     chronic opiate contract on file  Continue MS contin 30mg daily  Continue Percocet 7.5mg/325mg QID prn          Relevant Medications    oxyCODONE-acetaminophen (PERCOCET) 7.5-325 mg per tablet (Start on 3/21/2019)    morphine (MS CONTIN) 15 MG 12 hr tablet (Start on 3/21/2019)    oxyCODONE-acetaminophen (PERCOCET) 7.5-325 mg per tablet (Start on 4/20/2019)    morphine (MS CONTIN) 15 MG 12 hr tablet (Start on 4/20/2019)       Orthopedic    Chronic bilateral low back pain without sciatica    Overview     Combination of degenerative disc disease facet arthropathy chronic L1 compression fracture.         Relevant Medications     oxyCODONE-acetaminophen (PERCOCET) 7.5-325 mg per tablet (Start on 3/21/2019)    morphine (MS CONTIN) 15 MG 12 hr tablet (Start on 3/21/2019)    oxyCODONE-acetaminophen (PERCOCET) 7.5-325 mg per tablet (Start on 4/20/2019)    morphine (MS CONTIN) 15 MG 12 hr tablet (Start on 4/20/2019)                TESTING:  -- none     REFERRAL:  -- none    PREVENTION OF PAIN:   -- continue duloxetine 20mg daily - take in the MORNING to help with insomnia     AS-NEEDED TREATMENT OF PAIN:  -- refill MS Contin 15mg daily #30, 3/21 to 4/20/19 and 4/20/19 to 5/20/19   -- refill Percocet 7.5mg/325mg 4x per day #120,  3/21 to 4/20/19 and 4/20/19 to 5/20/19   -- continue Flexeril 10mg twice a day       Follow-up in about 3 months (around 5/17/2019).     Kelly Prajapati MD

## 2019-03-12 ENCOUNTER — PATIENT MESSAGE (OUTPATIENT)
Dept: NEUROLOGY | Facility: CLINIC | Age: 44
End: 2019-03-12

## 2019-03-21 ENCOUNTER — PATIENT MESSAGE (OUTPATIENT)
Dept: NEUROLOGY | Facility: CLINIC | Age: 44
End: 2019-03-21

## 2019-03-22 ENCOUNTER — TELEPHONE (OUTPATIENT)
Dept: NEUROLOGY | Facility: CLINIC | Age: 44
End: 2019-03-22

## 2019-03-25 ENCOUNTER — PATIENT MESSAGE (OUTPATIENT)
Dept: NEUROLOGY | Facility: CLINIC | Age: 44
End: 2019-03-25

## 2019-04-17 ENCOUNTER — OFFICE VISIT (OUTPATIENT)
Dept: ENDOCRINOLOGY | Facility: CLINIC | Age: 44
End: 2019-04-17
Payer: MEDICARE

## 2019-04-17 VITALS
BODY MASS INDEX: 23.3 KG/M2 | HEIGHT: 63 IN | HEART RATE: 77 BPM | SYSTOLIC BLOOD PRESSURE: 98 MMHG | WEIGHT: 131.5 LBS | DIASTOLIC BLOOD PRESSURE: 60 MMHG

## 2019-04-17 DIAGNOSIS — E03.8 SECONDARY HYPOTHYROIDISM: ICD-10-CM

## 2019-04-17 DIAGNOSIS — E83.51 HYPOCALCEMIA: ICD-10-CM

## 2019-04-17 DIAGNOSIS — M81.0 OSTEOPOROSIS, UNSPECIFIED OSTEOPOROSIS TYPE, UNSPECIFIED PATHOLOGICAL FRACTURE PRESENCE: ICD-10-CM

## 2019-04-17 DIAGNOSIS — E23.0 PANHYPOPITUITARISM: Primary | ICD-10-CM

## 2019-04-17 DIAGNOSIS — E27.49 SECONDARY ADRENAL INSUFFICIENCY: ICD-10-CM

## 2019-04-17 PROCEDURE — 3008F BODY MASS INDEX DOCD: CPT | Mod: CPTII,S$GLB,, | Performed by: INTERNAL MEDICINE

## 2019-04-17 PROCEDURE — 99999 PR PBB SHADOW E&M-EST. PATIENT-LVL III: CPT | Mod: PBBFAC,,, | Performed by: INTERNAL MEDICINE

## 2019-04-17 PROCEDURE — 99204 OFFICE O/P NEW MOD 45 MIN: CPT | Mod: S$GLB,,, | Performed by: INTERNAL MEDICINE

## 2019-04-17 PROCEDURE — 99999 PR PBB SHADOW E&M-EST. PATIENT-LVL III: ICD-10-PCS | Mod: PBBFAC,,, | Performed by: INTERNAL MEDICINE

## 2019-04-17 PROCEDURE — 99204 PR OFFICE/OUTPT VISIT, NEW, LEVL IV, 45-59 MIN: ICD-10-PCS | Mod: S$GLB,,, | Performed by: INTERNAL MEDICINE

## 2019-04-17 PROCEDURE — 3008F PR BODY MASS INDEX (BMI) DOCUMENTED: ICD-10-PCS | Mod: CPTII,S$GLB,, | Performed by: INTERNAL MEDICINE

## 2019-04-17 NOTE — PROGRESS NOTES
CHIEF COMPLAINT: Panhypopit  43 year old being seen as a new patient. Had been seeing Dr. Blair. On cortef 20 once daily. Synthroid 175 mcg and cytomel 25 mcg daily.  has panhypopit that was diagnosed at age 16 due to delayed puberty- At [facility]. States had to induce puberty by pediatric Endocrinology. States had been on that dose of steroids since diagnosis. No palpitations. No tremors. Overall feeling well. States on calcitriol for history of hypocalcemia.  has had 2 separate pelvic fractures as well as rib fractures.  All from falls. Not wearing medic alert bracelet. States in 2011 had an adrenal crisis. Aware of sick day precautions.  has not had any HRT since late 20's. Last DEXA in Care [facility] ([facility]) in 2016. Has done reclast x 1 and Prolia in the past. No tx since then. Taking Ca + D. Had an MRI 7/24/18 at [facility] showing L1 compression fracture. Has not had a fall in a year.       PAST MEDICAL HISTORY/PAST SURGICAL HISTORY:  Reviewed in [facility]    SOCIAL HISTORY: NO T/A    FAMILY HISTORY:  + Hypothyroidism. No DM.     MEDICATIONS/ALLERGIES: The patient's MedCard has been updated and reviewed.      ROS:   Constitutional: No recent significant weight change  Eyes: No recent visual changes  ENT: No dysphagia  Cardiovascular: Denies current anginal symptoms  Respiratory: Denies current respiratory difficulty  Gastrointestinal: No N/V  GenitoUrinary - No dysuria  Skin: No new skin rash  Neurologic: No focal neurologic complaints  Remainder ROS negative        PE:    GENERAL: Well developed, well nourished.  PSYCH:  appropriate mood and affect  EYES:  PERRL, EOM intact.  ENT: Nares patent, oropharynx clear, mucosa pink, Poor dentition.   NECK: Supple, trachea midline, no palpable thyroid nodules  CHEST: Resp even and unlabored, CTA bilateral.  CARDIAC: RRR, S1, S2 heard, no murmurs, rubs, S3, or S4  ABDOMEN: Soft, non-tender, non-distended;  No organomegaly  VASCULAR:  DP pulses +2/4  bilaterally, no edema  NEURO: Gait steady, CN II-VII grossly intact  SKIN: No areas of breakdown, no acanthosis nigracans.    LABS     ASSESSMENT/PLAN:  1. Panhypopit- Appears diagnosed as a teenager. Puberty had to be induced. Will need to get previous records. Will check remainder of pituitary axis.     2. Secondary Adrenal Insufficiency- discussed stress dose steroids. Discussed wearing a medic alert bracelet. Currently only taking Cortef once daily. Ideally should be on it BID. However, has been on this dose for many years. Last adrenal crisis in 2011.     3. Osteoporosis- with multiple fractures. Had been on Prolia and reclast in the past. Appears that has not been on HRT. Will do w/u as seen below. Will need to restart therapy. Discussed possible increased risk for ONJ with poor dentition and use of an antiresorptive agent. However, with several fractures the benefit may outweigh the potential risk. Advised seeking dental care. Check Vit D.     4. Secondary Hypothyroidism- Check TFT. On T4 and T3 therapy. Will check Ab just to verify not autimmune. However with history most likely secondary and in that case will need to follow FT4 and FT3 and not TSH.     5. Hypocalcemia- unclear etiology. Will need to review records. On rocaltrol.       FOLLOWUP  CMP, TSH, Ft4, T3, fasting CTX, TPO, 8 AM ACTH, IGF-1, LH, FSH, prolactin, PTH, Vit D  DEXA  Records from Dr. Chakraborty Office.

## 2019-04-18 ENCOUNTER — TELEPHONE (OUTPATIENT)
Dept: ENDOCRINOLOGY | Facility: CLINIC | Age: 44
End: 2019-04-18

## 2019-04-24 ENCOUNTER — HOSPITAL ENCOUNTER (OUTPATIENT)
Dept: RADIOLOGY | Facility: HOSPITAL | Age: 44
Discharge: HOME | End: 2019-04-24
Attending: INTERNAL MEDICINE
Payer: MEDICARE

## 2019-04-24 DIAGNOSIS — E23.0 PANHYPOPITUITARISM: ICD-10-CM

## 2019-04-24 DIAGNOSIS — M81.0 OSTEOPOROSIS, UNSPECIFIED OSTEOPOROSIS TYPE, UNSPECIFIED PATHOLOGICAL FRACTURE PRESENCE: ICD-10-CM

## 2019-04-24 PROCEDURE — 77080 DEXA BONE DENSITY SPINE HIP: ICD-10-PCS | Mod: 26,,, | Performed by: RADIOLOGY

## 2019-04-24 PROCEDURE — 77080 DXA BONE DENSITY AXIAL: CPT | Mod: TC,PO

## 2019-04-24 PROCEDURE — 77080 DXA BONE DENSITY AXIAL: CPT | Mod: 26,,, | Performed by: RADIOLOGY

## 2019-04-30 ENCOUNTER — PATIENT MESSAGE (OUTPATIENT)
Dept: NEUROLOGY | Facility: CLINIC | Age: 44
End: 2019-04-30

## 2019-05-01 ENCOUNTER — PATIENT MESSAGE (OUTPATIENT)
Dept: NEUROLOGY | Facility: CLINIC | Age: 44
End: 2019-05-01

## 2019-05-02 ENCOUNTER — PATIENT MESSAGE (OUTPATIENT)
Dept: NEUROLOGY | Facility: CLINIC | Age: 44
End: 2019-05-02

## 2019-05-07 ENCOUNTER — TELEPHONE (OUTPATIENT)
Dept: NEUROLOGY | Facility: CLINIC | Age: 44
End: 2019-05-07

## 2019-05-08 ENCOUNTER — OFFICE VISIT (OUTPATIENT)
Dept: NEUROLOGY | Facility: CLINIC | Age: 44
End: 2019-05-08
Payer: MEDICARE

## 2019-05-08 VITALS
RESPIRATION RATE: 16 BRPM | DIASTOLIC BLOOD PRESSURE: 71 MMHG | SYSTOLIC BLOOD PRESSURE: 98 MMHG | BODY MASS INDEX: 23.63 KG/M2 | HEART RATE: 75 BPM | WEIGHT: 133.38 LBS | HEIGHT: 63 IN

## 2019-05-08 DIAGNOSIS — S22.000A COMPRESSION FRACTURE OF BODY OF THORACIC VERTEBRA: ICD-10-CM

## 2019-05-08 DIAGNOSIS — R20.2 NUMBNESS AND TINGLING OF BOTH LEGS BELOW KNEES: ICD-10-CM

## 2019-05-08 DIAGNOSIS — M47.816 SPONDYLOSIS OF LUMBAR REGION WITHOUT MYELOPATHY OR RADICULOPATHY: ICD-10-CM

## 2019-05-08 DIAGNOSIS — M54.50 CHRONIC BILATERAL LOW BACK PAIN WITHOUT SCIATICA: ICD-10-CM

## 2019-05-08 DIAGNOSIS — Z79.891 CHRONICALLY ON OPIATE THERAPY: Primary | ICD-10-CM

## 2019-05-08 DIAGNOSIS — R20.0 NUMBNESS AND TINGLING OF BOTH LEGS BELOW KNEES: ICD-10-CM

## 2019-05-08 DIAGNOSIS — G89.29 CHRONIC BILATERAL LOW BACK PAIN WITHOUT SCIATICA: ICD-10-CM

## 2019-05-08 DIAGNOSIS — M46.1 SI (SACROILIAC) JOINT INFLAMMATION: ICD-10-CM

## 2019-05-08 PROCEDURE — 99999 PR PBB SHADOW E&M-EST. PATIENT-LVL IV: ICD-10-PCS | Mod: PBBFAC,,, | Performed by: PSYCHIATRY & NEUROLOGY

## 2019-05-08 PROCEDURE — 99999 PR PBB SHADOW E&M-EST. PATIENT-LVL IV: CPT | Mod: PBBFAC,,, | Performed by: PSYCHIATRY & NEUROLOGY

## 2019-05-08 PROCEDURE — 99214 OFFICE O/P EST MOD 30 MIN: CPT | Mod: S$GLB,,, | Performed by: PSYCHIATRY & NEUROLOGY

## 2019-05-08 PROCEDURE — 3008F BODY MASS INDEX DOCD: CPT | Mod: CPTII,S$GLB,, | Performed by: PSYCHIATRY & NEUROLOGY

## 2019-05-08 PROCEDURE — 99214 PR OFFICE/OUTPT VISIT, EST, LEVL IV, 30-39 MIN: ICD-10-PCS | Mod: S$GLB,,, | Performed by: PSYCHIATRY & NEUROLOGY

## 2019-05-08 PROCEDURE — 3008F PR BODY MASS INDEX (BMI) DOCUMENTED: ICD-10-PCS | Mod: CPTII,S$GLB,, | Performed by: PSYCHIATRY & NEUROLOGY

## 2019-05-08 RX ORDER — CEFDINIR 300 MG/1
300 CAPSULE ORAL 2 TIMES DAILY
COMMUNITY
End: 2019-10-13

## 2019-05-08 RX ORDER — MORPHINE SULFATE 15 MG/1
15 TABLET, FILM COATED, EXTENDED RELEASE ORAL 2 TIMES DAILY
Qty: 60 TABLET | Refills: 0 | Status: SHIPPED | OUTPATIENT
Start: 2019-05-08 | End: 2019-06-07

## 2019-05-08 RX ORDER — OXYCODONE AND ACETAMINOPHEN 7.5; 325 MG/1; MG/1
1 TABLET ORAL EVERY 6 HOURS PRN
Qty: 120 TABLET | Refills: 0 | Status: SHIPPED | OUTPATIENT
Start: 2019-05-20 | End: 2019-06-12 | Stop reason: SDUPTHER

## 2019-05-08 NOTE — PATIENT INSTRUCTIONS
TESTING:  -- MRI thoracic and lumbar spine to evaluate worsening tingling     REFERRAL:  -- none    PREVENTION OF PAIN:   -- continue duloxetine 20mg daily - take in the MORNING to help with insomnia   -- return to surgery center 5/23/19 for bilateral sacroiliac joint injection (no sedation)     AS-NEEDED TREATMENT OF PAIN:  -- RAISE MS Contin 15mg nightly to TWICE A DAY - use up your current prescription and then when it is running low fill the new paper prescription   -- will refill Percocet 7.5mg/325mg 4x per day #120 on 5/20/19   -- continue Flexeril 10mg twice a day

## 2019-05-08 NOTE — H&P (VIEW-ONLY)
Date of service: 5/8/2019  Referring provider: No ref. provider found    Subjective:      Chief complaint: Leg Pain; Back Pain; and Numbness       Patient ID: Radah Ramirez is a 43 y.o. 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  Today she reports she is a little worse.  She feels that the lumbar facet mediated pain is returning and she does not think her pain medications are working as well as previously (percocet lasting only 1-2 hours).  Her pain is located in the middle and low back. Her current pain score seven with a range of 4-9.  She remains on duloxetine.  She remains on Percocet 7.5 mg q.i.d. and morphine XR 15 mg nightly.    She reports she noted a bruise on her back last week. She does not know how it got there, she does not recall falling or hitting anything. The pain that is present is in the sacrum radiating into the buttocks / hips. There is tingling pain in the thighs and numbness in the feet (dorsum) right much worse than left. No bowel bladder changes.       INTERVAL HISTORY - 2/28/19    Today she reports she is a little bit better.  The lumbar facet mediated pain continues to be fine.  She continues to experience bone pain in the middle of her back to the compression fracture.  She has not consulted with Neurosurgery due to chickening out.  Her chronic opiate therapy continues to be effective.  She has intermittent constipation that she is managing with MiraLax.  Duloxetine 20 mg is effective for pain. She takes it at night.  She does noted makes her a little bit jittery.  She has been having insomnia related to health issues with her mom.  She initially thought this was Percocet interfering with her sleep and she taper down but  her sleep did not improve. Her current pain score is four with a range 4 to 9.  Sometimes the left hand feels weak with grasp.    INTERVAL HISTORY - 9/21/18    She is status post lumbar medial branch nerve RFA at L2-3-4-5 on 8/30/18 (left) then 9/6/18 (right). In the interim she had developed some myoclonic twitching in the lower more than upper extremities which I suspected was probably from duloxetine so I asked her to stop and then resume at lower dose.     She reports that since the RFA her back pain has lessened but now she feels her bone pain more. She rates her relief as 50%. The pain continues to be in the lower back. Her pain score is 5 with a range of 4-10.    The twitching did lessen on the Cymbalta 20 mg.    She will see Dr. Cooper on 10/16/18 2 discussed the vertebroplasty or kyphoplasty    INTERVAL HISTORY - 8/17/18     She is status post bilateral lumbar medial branch block at L2-3-4-5 on 08/09/2018 and 08/16/2018 with 50% pain relief for approximately 12 hours after both blocks. During that time she was able to stand up and cook herself a meal which she is normally unable to do. She was less sedentary and able to stand more than normal and was able to grocery shop.  She would like to proceed with RFA of these nerves.    In the interim I received imaging records from [facility] specifically her MRI thoracic spine without contrast which shows that she has a severe T9 compression fracture of indeterminate age but we do know it is new since 04/13/2018 and was already seen on x-ray on 06/16/2018.  She does have mid back pain but the most severe of her pains in the lower spine.  She also has the chronic severe L1 compression fracture.    She continues on her chronic opiate regimen with good relief no adverse effects and no Advair behaviors.  If her current pain score 5 with a range of 3-10.    INTERVAL HISTORY - 7/20/18    UDS done last visit 6/18/18 concordant with prescribed morphine,  oxycodone and fentanyl given in ED day before    Today she reports she is about the same. Pain is in the middle and lower back, currently 6/10, range 4 to 10.     She reports she had new Xrays done showing new compression fractures with her PCP. She is pending an MRI. She will also be getting a bone density scan.    Current back pain is 6/10 with range of 4 to 10.     INTERVAL HISTORY - 6/18/18     The below problem (posterior interosseus neuropathy) has completely resolved and did so really a week later after onset. She has had no new weakness occur.     She currently follows for chronic pain management with neurologist Dr. Hunter Gayle in [facility] but requests to change to me to have closer to home provider. Her main pain is axial back pain with radiation down the legs to the level of the knees. Her current pain score is 8. She last filled her medications on 5/26. Her stable regimen is listed below. Her mother expresses concern as to whether anything else can be done to minimize her medications. She reports she cannot get ESIs due to her chronic steroid use. She reports she has never received lumbar facet blocks / RFAs.    Her pain is currently flared because she recently became dizzy in the setting of UTI, poor PO intake, and fell, on her sacrum. Was evaluated in the ED. XR showed chronic L1 fracture, age indeterminate but new since 4/3/18 T9 fracture, and possible fracture at the sacrococcygeal junction. She was discharged after having been given toradol and percocet in the ED.     ORIGINAL NEURO HISTORY - 4/5/18   Patient woke up yesterday 6am to use restroom, noticed left hand was weak. Went back to sleep woke up at 1030 am, still weak. Reports she cannot extend the fingers. Wrsit ok. NO PAIN. There is no numbness or tingling at all. She slept on her back, with the left wrist extended and under her chin, but was never on her humerus. No previous episodes.      Incidentally found to have optic nerve thinning  by eye doctor - undergoing visual fields for further diagnosis.      Takes MS contin, percocet, benadryl, flexeril before bed but all doses were typical. No alcohol before bed.     HOSPITAL COURSE:  4/5/18: no clinical changes. OT recommended outpatient therapy. MRI cervical done and normal. No pain.      #1 Left hand weakness - occurring after waking up after sleeping with left wrist extended supporting the chin. No numbness at all and no pain. Because this initially appeared to me to be involvement of 3 separate peripheral nerves (median, ulnar, radial) or C7-T1 roots and patient has bilateral babinski I recommend cervical spine imaging with contrast which was completely normal. 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 This single nerve explains all her motor weakness and as it is a pure motor nerve explains why she has no sensory loss. Her inflammatory markers are elevated but the sudden onset and complete lack of pain argue against a mononeuritis multilpex or an early parsonage reina as the diagnosis. Thus, I do think this is compressive, from a position she sustained while sleeping. I expect this to resolve or markedly improve within 3 months.     Current acute treatment:  -- MS Contin 15mg nightly  -- Percocet 7.5mg/325mg QID     Current prevention:  -- duloxetine 20 mg - some jitteriness    Previously tried:   -- none    Procedural history:   -- bilateral lumbar medial branch block at L2-3-4-5 on 08/09/2018 and 08/16/2018 with 50% pain relief for approximately 12 hours after both blocks.      Review of patient's  "allergies indicates:  No Known Allergies  Current Outpatient Medications   Medication Sig Dispense Refill    benzonatate (TESSALON PERLES ORAL) Take by mouth.      calcitRIOL (ROCALTROL) 0.5 MCG Cap Take 0.5 mcg by mouth every Mon, Wed, Fri.      cefdinir (OMNICEF) 300 MG capsule Take 300 mg by mouth 2 (two) times daily.      cyclobenzaprine (FLEXERIL) 10 MG tablet Take 1 tablet (10 mg total) by mouth 2 (two) times daily. 60 tablet 11    dicyclomine (BENTYL) 20 mg tablet Take 20 mg by mouth 4 (four) times daily as needed.      DULoxetine (CYMBALTA) 20 MG capsule Take 2 capsules (40 mg total) by mouth once daily. 60 capsule 11    ergocalciferol (VITAMIN D2) 50,000 unit Cap Take 50,000 Units by mouth every 7 days.      hydrocortisone (CORTEF) 20 MG Tab Take 20 mg by mouth once daily.       levothyroxine (SYNTHROID, LEVOTHROID) 175 MCG tablet Take 175 mcg by mouth once daily.      liothyronine (CYTOMEL) 5 MCG Tab Take 25 mcg by mouth once daily.      morphine (MS CONTIN) 15 MG 12 hr tablet Take 1 tablet (15 mg total) by mouth 2 (two) times daily. OK to fill early given dose change 60 tablet 0    oxyCODONE-acetaminophen (PERCOCET) 7.5-325 mg per tablet Take 1 tablet by mouth every 6 (six) hours as needed for Pain. 120 tablet 0    promethazine (PHENERGAN) 25 MG tablet Take 25 mg by mouth every 4 (four) hours.       No current facility-administered medications for this visit.        Past Medical History  Past Medical History:   Diagnosis Date    Adrenal insufficiency     Arthritis     "Bulging and herniated lumbar discs"     Chronic instability of knee     Encounter for blood transfusion     Humerus fracture     Panhypopituitarism     Pelvic fracture     Rib fracture     Thyroid disease        Past Surgical History  Past Surgical History:   Procedure Laterality Date    Block-nerve-medial branch-lumbar- L2-3-4-5 Bilateral 8/9/2018    [facility]    Block-nerve-medial " branch-lumbar-L2-3-4-5 Bilateral 8/16/2018    [facility] at [facility] OR    GASTRIC BYPASS  2003    RADIOFREQUENCY ABLATION- L2-3-4-5 Left 8/30/2018    Per[facility] at [facility] OR    RADIOFREQUENCY ABLATION-L2-3-4-5 Right 9/6/2018    Performed by [facility] at [facility] OR       Family History  Family History   Problem Relation Age of Onset    Atrial fibrillation Mother        Social History  Social History     Socioeconomic History    Marital status: Single     Spouse name: Not on file    Number of children: Not on file    Years of education: Not on file    Highest education level: Not on file   Occupational History    Not on file   Social Needs    Financial resource strain: Not on file    Food insecurity:     Worry: Not on file     Inability: Not on file    Transportation needs:     Medical: Not on file     Non-medical: Not on file   Tobacco Use    Smoking status: Never Smoker    Smokeless tobacco: Never Used   Substance and Sexual Activity    Alcohol use: No    Drug use: No    Sexual activity: Not on file   Lifestyle    Physical activity:     Days per week: Not on file     Minutes per session: Not on file    Stress: Not on file   Relationships    Social connections:     Talks on phone: Not on file     Gets together: Not on file     Attends [employer] service: Not on file     Active member of club or organization: Not on file     Attends meetings of clubs or organizations: Not on file     Relationship status: Not on file   Other Topics Concern    Not on file   Social History Narrative    Not on file        Review of Systems  14-point review of systems as follows:   No check adore indicates NEGATIVE response   Constitutional: [] weight loss, [] change to appetite   Eyes: [] change in vision, [] double vision   Ears, nose, mouth, throat: [] frequent nose bleeds, [] ringing in the ears   Respiratory: [x] cough, [] wheezing   Cardiovascular: [] chest pain,  [] palpitations   Gastrointestinal: [] jaundice, [] nausea/vomiting   Genitourinary: [] incontinence, [] burning with urination   Hematologic/lymphatic: [] easy bruising/bleeding, [] night sweats   Neurological: [x] numbness, [] weakness   Endocrine: [] fatigue, [x] heat/cold intolerance   Allergy/Immunologic: [] fevers, [] chills   Musculoskeletal: [x] muscle pain, [x] joint pain   Psychiatric: [] thoughts of harming self/others, [] depression   Integumentary: [] rashes, [] sores that do not heal     Objective:        Vitals:    05/08/19 1416   BP: 98/71   Pulse: 75   Resp: 16     Body mass index is 23.63 kg/m².       LUMBAR SPINE:  INSPECTION: no lesions   ROM: normal flexion but limited extension and lateral rotation   MUSCLE SPASM: mild    PARASPINAL TENDERNESS: bilateral   FACET LOADING: bilateral   SI JOINT TENDERNESS:  Bilateral  JI: neg  STRAIGHT LEG RAISE: neg  CROSSED STRAIGHT LEG RAISE: neg  HIP INTERNAL ROM: normal  HIP EXTERNAL ROM: normal   GT TENDERNESS:  Bilateral  Strength intact in all lumbosacral dermatomes 5/5 bilateral  Reflexes 2+ bilateral  Toes upgoing (chronic)    Previous -   General: Well developed, well nourished.  No acute distress.   HEENT: Atraumatic, normocephalic. Poor dentition.   Neck: Trachea midline  Cardiovascular: Vitals reviewed. Normal peripheral perfusion.   Pulmonary: No increased work of breathing.  Abdomen/GI: No guarding  Musculoskeletal: No obvious joint deformities, moves all extremities symmetrically and well.     LUMBAR SPINE:  INSPECTION: no lesions   ROM: normal flexion but limited extension and lateral rotation   MUSCLE SPASM: mild   PARASPINAL TENDERNESS: bilateral   FACET LOADING: bilateral left worse than right   SI JOINT TENDERNESS: neg  JI: neg  STRAIGHT LEG RAISE: neg  CROSSED STRAIGHT LEG RAISE: neg  HIP INTERNAL ROM: normal  HIP EXTERNAL ROM: normal   GT TENDERNESS:       Data Review:     I have personally reviewed the referring provider's notes,  labs, & imaging made available to me today.     RADIOLOGY STUDIES:  I have personally reviewed the pertinent images performed.     7/24/18 MRI thoracic spine without contrast  1.  Severe T9 compression fracture of indeterminate age which is at least partially subacute with minimal bone edema.  2.  Severe L1 compression fracture which appears chronic, with no associated bone marrow edema.  3.  Chronic T11 Schmorl's with no associated bone marrow edema.  4.  Mild multilevel degenerative disc disease  5.  There is no spinal or foraminal stenosis appreciated  6.  Cholelithiasis    07/24/2018 MRI lumbar spine without contrast  1.  Severe chronic L1 compression fracture with no bone marrow edema appreciated  2.  Mild degenerative disc disease and mild to moderate facet arthropathy predominantly lower spine    08/01/2018 NM HIDA scan  1.  No evidence of cystic or common duct obstruction  2.  Normal gallbladder function    6/8/15 MRI lumbar spine w/o contrast:  L1-2: minimal broad based disc protrusion, moderate facet arthropathy  L2-3: mild broad based disc protrusion L > R, annular tear, moderate facet arthropathy, mild stenosis left lateral recess (with mild compression of left L3 root)  L3-4: mild broad based disc protrusion, mild/moderate facet arthropathy   L4-5: mild broad based disc protrusion, moderate/severe facet arthropathy, mild lateral recess stenosis (with mild compression of L5 nerve roots)  L5-S1: transitional vertebra. Mild broad based disc protrusion R > L. Moderate facet arthropathy. Mild/mod compression of right lateral recess with compression of right S1 root.  Chronic L1 fracture   Chronic healed T11 fracture     Results for orders placed or performed during the hospital encounter of 04/03/18   MRI Brain W WO Contrast    Narrative     MRI BRAIN WITH AND WITHOUT CONTRAST    CPT: 58104    Clinical data:  Neurological deficit.  Left hand paralysis.    Comparison: CT head 04/03/2018    Technique:  Multiplanar, multisequence MRI of the brain was performed before and after administration of contrast.     Findings:     The diffusion weighted imaging is negative for acute or subacute ischemia.  Gray-white matter differentiation appears within normal limits.  No extra-axial fluid collection, hydrocephalus, mass effect, midline shift is identified.  There is no acute hemorrhage.  Visualized vascular flow voids appear intact.  Visualized orbits appear unremarkable.  Visualized paranasal sinuses and mastoid air cells appear clear.  No mass is identified.  No pathologic enhancement is noted.  There is demonstrated a shallow sella turcica with minimal pituitary tissue identified.    Impression       1. No acute intracranial abnormality.  2. No pathologic enhancement.      Electronically signed by: NITHIN HOYT MD  Date:     04/03/18  Time:    19:40    CT Head Without Contrast    Narrative    CT scan of the head without contrast    Clinical history: Weakness in the fingers of the left hand    [id number]    Findings: Multiple computerized images of the head were obtained in axial projection using thin slices.  No intravenous contrast was administered.  The study was viewed at soft tissue and bone window settings.  Reconstructions were done in coronal and sagittal planes.    No focal lesions are identified.  There is no evidence of ventricular dilatation, midline shift, or hemorrhage.  No territorial infarct was visualized.  The gray-white junction is maintained throughout.  No extracerebral fluid collection was demonstrated.  The bony calvarium appears intact.  No sinus or mastoid abnormality was noted.  The sella is shallow and very small which is of uncertain significance.  This might be better evaluated with an MRI scan.    Impression     There are no acute findings.  Note is made of the sella being shallow and somewhat small which is of uncertain significance.  An MRI scan may be helpful in further evaluation of  this area.      Electronically signed by: RUTHIE MO MD  Date:     04/03/18  Time:    16:23        Lab Results   Component Value Date     (L) 04/24/2019     (L) 04/24/2019    K 3.9 04/24/2019    K 3.9 04/24/2019    MG 1.7 04/03/2018     04/24/2019     04/24/2019    CO2 26 04/24/2019    CO2 26 04/24/2019    BUN 9 04/24/2019    BUN 9 04/24/2019    CREATININE 0.9 04/24/2019    CREATININE 0.9 04/24/2019    GLU 71 04/24/2019    GLU 71 04/24/2019    HGBA1C 4.9 04/04/2018    AST 41 (H) 04/24/2019    AST 41 (H) 04/24/2019    ALT 12 04/24/2019    ALT 12 04/24/2019    ALBUMIN 3.7 04/24/2019    ALBUMIN 3.7 04/24/2019    PROT 7.1 04/24/2019    PROT 7.1 04/24/2019    BILITOT 0.3 04/24/2019    BILITOT 0.3 04/24/2019       Lab Results   Component Value Date    WBC 5.49 06/17/2018    HGB 11.1 (L) 06/17/2018    HCT 33.1 (L) 06/17/2018    MCV 85 06/17/2018     06/17/2018       Lab Results   Component Value Date    TSH 0.575 04/24/2019       Assessment & Plan:       Problem List Items Addressed This Visit        Neuro    Spondylosis of lumbar region without myelopathy or radiculopathy    Overview     Axial back pain more so that radicular radiation down to knees. 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 Paraspinals tender. SLR negative. SI joint non tender. + facet loading bilaterally. Pain most likely facetogenic given this exam pattern and worse pain with extension / roation.    Reviewed the records from her previous pain physician, patient had never been offered LMBBs before. She is bilateral lumbar medial branch block at L2-3-4-5 on 08/09/2018 and 08/16/2018 with 50% pain relief for approximately 12 hours after both blocks.  Proceeded with RFA of the same nerves on 8/30/18 (left) then 9/6/18 (right) with 50% relief of back  pain    Continue duloxetine for chronic musculoskeletal pain at lower dose since 40mg caused diffuse myoclonus         Relevant Medications    morphine (MS CONTIN) 15 MG 12 hr tablet    Compression fracture of body of thoracic vertebra    Overview     Patient has known chronic T11 and L1 fractures but has a more recent T9 fracture sustained after April 2018.  She does have more upper back pain than previous and I am concerned this may be coming from the new T9 fracture so for this reason I referred her to Neurosurgery but she declined this visit.    Due to progressive numbness in the feet repeat thoracic imaging         Relevant Orders    MRI Thoracic Spine Without Contrast    MRI Lumbar Spine Without Contrast       Psychiatric    Chronically on opiate therapy - Primary    Overview     Patient would like me to take over her chronic pain management. Reviewed her previous pain physician's notes which show good relief and no signs of adverse behavior. She has been on the same regimen for chronic low back pain for years. Her UDS was consistent with prescribed substances. Opiod risk tool shows she is low risk with score 1. Her MME is low at 60 MME daily.     chronic opiate contract on file  Raise MS Contin from 15 nightly to 15 yolanda BID   Continue Percocet 7.5mg/325mg QID prn          Relevant Medications    morphine (MS CONTIN) 15 MG 12 hr tablet       Orthopedic    Chronic bilateral low back pain without sciatica    Overview     Combination of degenerative disc disease facet arthropathy chronic L1 compression fracture.         Relevant Medications    morphine (MS CONTIN) 15 MG 12 hr tablet    SI (sacroiliac) joint inflammation    Overview     Markedly tender were bilateral sacroiliac joints  I think at least in part some of the new pain is SI joint mediated  Not clear that the lumbar facet mediated pain is actually returning  Explained that this would not cause the numbness in her feet that must be a separate  issue    Return for SI joint injection bilateral as she is already on chronic opiates in limited with anti-inflammatories due to chronic steroid use            Other    Numbness and tingling of both legs below knees    Overview     Bilateral L5 distribution tingling though not reproduced on exam  Concerned that this is representing new thoracic or lumbar spine pathology due to her numerous history of compression fractures  Evaluate with updated thoracic and lumbar imaging         Relevant Orders    MRI Thoracic Spine Without Contrast    MRI Lumbar Spine Without Contrast                TESTING:  -- MRI thoracic and lumbar spine to evaluate worsening tingling     REFERRAL:  -- none    PREVENTION OF PAIN:   -- continue duloxetine 20mg daily - take in the MORNING to help with insomnia   -- return to surgery center 5/23/19 for bilateral sacroiliac joint injection (no sedation)     AS-NEEDED TREATMENT OF PAIN:  -- RAISE MS Contin 15mg nightly to TWICE A DAY - use up your current prescription and then when it is running low fill the new paper prescription   -- will refill Percocet 7.5mg/325mg 4x per day #120 on 5/20/19   -- continue Flexeril 10mg twice a day       Follow up in about 1 month (around 6/6/2019).     Kelly Prajapati MD

## 2019-05-08 NOTE — PROGRESS NOTES
Date of service: 5/8/2019  Referring provider: No ref. provider found    Subjective:      Chief complaint: Leg Pain; Back Pain; and Numbness       Patient ID: Radha Ramirez is a 43 y.o. 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  Today she reports she is a little worse.  She feels that the lumbar facet mediated pain is returning and she does not think her pain medications are working as well as previously (percocet lasting only 1-2 hours).  Her pain is located in the middle and low back. Her current pain score seven with a range of 4-9.  She remains on duloxetine.  She remains on Percocet 7.5 mg q.i.d. and morphine XR 15 mg nightly.    She reports she noted a bruise on her back last week. She does not know how it got there, she does not recall falling or hitting anything. The pain that is present is in the sacrum radiating into the buttocks / hips. There is tingling pain in the thighs and numbness in the feet (dorsum) right much worse than left. No bowel bladder changes.       INTERVAL HISTORY - 2/28/19    Today she reports she is a little bit better.  The lumbar facet mediated pain continues to be fine.  She continues to experience bone pain in the middle of her back to the compression fracture.  She has not consulted with Neurosurgery due to chickening out.  Her chronic opiate therapy continues to be effective.  She has intermittent constipation that she is managing with MiraLax.  Duloxetine 20 mg is effective for pain. She takes it at night.  She does noted makes her a little bit jittery.  She has been having insomnia related to health issues with her mom.  She initially thought this was Percocet interfering with her sleep and she taper down but  her sleep did not improve. Her current pain score is four with a range 4 to 9.  Sometimes the left hand feels weak with grasp.    INTERVAL HISTORY - 9/21/18    She is status post lumbar medial branch nerve RFA at L2-3-4-5 on 8/30/18 (left) then 9/6/18 (right). In the interim she had developed some myoclonic twitching in the lower more than upper extremities which I suspected was probably from duloxetine so I asked her to stop and then resume at lower dose.     She reports that since the RFA her back pain has lessened but now she feels her bone pain more. She rates her relief as 50%. The pain continues to be in the lower back. Her pain score is 5 with a range of 4-10.    The twitching did lessen on the Cymbalta 20 mg.    She will see Dr. Cooper on 10/16/18 2 discussed the vertebroplasty or kyphoplasty    INTERVAL HISTORY - 8/17/18     She is status post bilateral lumbar medial branch block at L2-3-4-5 on 08/09/2018 and 08/16/2018 with 50% pain relief for approximately 12 hours after both blocks. During that time she was able to stand up and cook herself a meal which she is normally unable to do. She was less sedentary and able to stand more than normal and was able to grocery shop.  She would like to proceed with RFA of these nerves.    In the interim I received imaging records from [facility] specifically her MRI thoracic spine without contrast which shows that she has a severe T9 compression fracture of indeterminate age but we do know it is new since 04/13/2018 and was already seen on x-ray on 06/16/2018.  She does have mid back pain but the most severe of her pains in the lower spine.  She also has the chronic severe L1 compression fracture.    She continues on her chronic opiate regimen with good relief no adverse effects and no Advair behaviors.  If her current pain score 5 with a range of 3-10.    INTERVAL HISTORY - 7/20/18    UDS done last visit 6/18/18 concordant with prescribed morphine,  oxycodone and fentanyl given in ED day before    Today she reports she is about the same. Pain is in the middle and lower back, currently 6/10, range 4 to 10.     She reports she had new Xrays done showing new compression fractures with her PCP. She is pending an MRI. She will also be getting a bone density scan.    Current back pain is 6/10 with range of 4 to 10.     INTERVAL HISTORY - 6/18/18     The below problem (posterior interosseus neuropathy) has completely resolved and did so really a week later after onset. She has had no new weakness occur.     She currently follows for chronic pain management with neurologist Dr. Hunter Gayle in [facility] but requests to change to me to have closer to home provider. Her main pain is axial back pain with radiation down the legs to the level of the knees. Her current pain score is 8. She last filled her medications on 5/26. Her stable regimen is listed below. Her mother expresses concern as to whether anything else can be done to minimize her medications. She reports she cannot get ESIs due to her chronic steroid use. She reports she has never received lumbar facet blocks / RFAs.    Her pain is currently flared because she recently became dizzy in the setting of UTI, poor PO intake, and fell, on her sacrum. Was evaluated in the ED. XR showed chronic L1 fracture, age indeterminate but new since 4/3/18 T9 fracture, and possible fracture at the sacrococcygeal junction. She was discharged after having been given toradol and percocet in the ED.     ORIGINAL NEURO HISTORY - 4/5/18   Patient woke up yesterday 6am to use restroom, noticed left hand was weak. Went back to sleep woke up at 1030 am, still weak. Reports she cannot extend the fingers. Wrsit ok. NO PAIN. There is no numbness or tingling at all. She slept on her back, with the left wrist extended and under her chin, but was never on her humerus. No previous episodes.      Incidentally found to have optic nerve thinning  by eye doctor - undergoing visual fields for further diagnosis.      Takes MS contin, percocet, benadryl, flexeril before bed but all doses were typical. No alcohol before bed.     HOSPITAL COURSE:  4/5/18: no clinical changes. OT recommended outpatient therapy. MRI cervical done and normal. No pain.      #1 Left hand weakness - occurring after waking up after sleeping with left wrist extended supporting the chin. No numbness at all and no pain. Because this initially appeared to me to be involvement of 3 separate peripheral nerves (median, ulnar, radial) or C7-T1 roots and patient has bilateral babinski I recommend cervical spine imaging with contrast which was completely normal. 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 This single nerve explains all her motor weakness and as it is a pure motor nerve explains why she has no sensory loss. Her inflammatory markers are elevated but the sudden onset and complete lack of pain argue against a mononeuritis multilpex or an early parsonage reina as the diagnosis. Thus, I do think this is compressive, from a position she sustained while sleeping. I expect this to resolve or markedly improve within 3 months.     Current acute treatment:  -- MS Contin 15mg nightly  -- Percocet 7.5mg/325mg QID     Current prevention:  -- duloxetine 20 mg - some jitteriness    Previously tried:   -- none    Procedural history:   -- bilateral lumbar medial branch block at L2-3-4-5 on 08/09/2018 and 08/16/2018 with 50% pain relief for approximately 12 hours after both blocks.      Review of patient's  "allergies indicates:  No Known Allergies  Current Outpatient Medications   Medication Sig Dispense Refill    benzonatate (TESSALON PERLES ORAL) Take by mouth.      calcitRIOL (ROCALTROL) 0.5 MCG Cap Take 0.5 mcg by mouth every Mon, Wed, Fri.      cefdinir (OMNICEF) 300 MG capsule Take 300 mg by mouth 2 (two) times daily.      cyclobenzaprine (FLEXERIL) 10 MG tablet Take 1 tablet (10 mg total) by mouth 2 (two) times daily. 60 tablet 11    dicyclomine (BENTYL) 20 mg tablet Take 20 mg by mouth 4 (four) times daily as needed.      DULoxetine (CYMBALTA) 20 MG capsule Take 2 capsules (40 mg total) by mouth once daily. 60 capsule 11    ergocalciferol (VITAMIN D2) 50,000 unit Cap Take 50,000 Units by mouth every 7 days.      hydrocortisone (CORTEF) 20 MG Tab Take 20 mg by mouth once daily.       levothyroxine (SYNTHROID, LEVOTHROID) 175 MCG tablet Take 175 mcg by mouth once daily.      liothyronine (CYTOMEL) 5 MCG Tab Take 25 mcg by mouth once daily.      morphine (MS CONTIN) 15 MG 12 hr tablet Take 1 tablet (15 mg total) by mouth 2 (two) times daily. OK to fill early given dose change 60 tablet 0    oxyCODONE-acetaminophen (PERCOCET) 7.5-325 mg per tablet Take 1 tablet by mouth every 6 (six) hours as needed for Pain. 120 tablet 0    promethazine (PHENERGAN) 25 MG tablet Take 25 mg by mouth every 4 (four) hours.       No current facility-administered medications for this visit.        Past Medical History  Past Medical History:   Diagnosis Date    Adrenal insufficiency     Arthritis     "Bulging and herniated lumbar discs"     Chronic instability of knee     Encounter for blood transfusion     Humerus fracture     Panhypopituitarism     Pelvic fracture     Rib fracture     Thyroid disease        Past Surgical History  Past Surgical History:   Procedure Laterality Date    Block-nerve-medial branch-lumbar- L2-3-4-5 Bilateral 8/9/2018    Performed by [facility] at [facility]    Block-nerve-medial " branch-lumbar-L2-3-4-5 Bilateral 8/16/2018    [facility] at [facility] OR    GASTRIC BYPASS  2003    RADIOFREQUENCY ABLATION- L2-3-4-5 Left 8/30/2018    Performed [facility] at [facility] OR    RADIOFREQUENCY ABLATION-L2-3-4-5 Right 9/6/2018    Performed by [facility] at [facility] OR       Family History  Family History   Problem Relation Age of Onset    Atrial fibrillation Mother        Social History  Social History     Socioeconomic History    Marital status: Single     Spouse name: Not on file    Number of children: Not on file    Years of education: Not on file    Highest education level: Not on file   Occupational History    Not on file   Social Needs    Financial resource strain: Not on file    Food insecurity:     Worry: Not on file     Inability: Not on file    Transportation needs:     Medical: Not on file     Non-medical: Not on file   Tobacco Use    Smoking status: Never Smoker    Smokeless tobacco: Never Used   Substance and Sexual Activity    Alcohol use: No    Drug use: No    Sexual activity: Not on file   Lifestyle    Physical activity:     Days per week: Not on file     Minutes per session: Not on file    Stress: Not on file   Relationships    Social connections:     Talks on phone: Not on file     Gets together: Not on file     Attends Mandaeism service: Not on file     Active member of club or organization: Not on file     Attends meetings of clubs or organizations: Not on file     Relationship status: Not on file   Other Topics Concern    Not on file   Social History Narrative    Not on file        Review of Systems  14-point review of systems as follows:   No check adore indicates NEGATIVE response   Constitutional: [] weight loss, [] change to appetite   Eyes: [] change in vision, [] double vision   Ears, nose, mouth, throat: [] frequent nose bleeds, [] ringing in the ears   Respiratory: [x] cough, [] wheezing   Cardiovascular: [] chest pain,  [] palpitations   Gastrointestinal: [] jaundice, [] nausea/vomiting   Genitourinary: [] incontinence, [] burning with urination   Hematologic/lymphatic: [] easy bruising/bleeding, [] night sweats   Neurological: [x] numbness, [] weakness   Endocrine: [] fatigue, [x] heat/cold intolerance   Allergy/Immunologic: [] fevers, [] chills   Musculoskeletal: [x] muscle pain, [x] joint pain   Psychiatric: [] thoughts of harming self/others, [] depression   Integumentary: [] rashes, [] sores that do not heal     Objective:        Vitals:    05/08/19 1416   BP: 98/71   Pulse: 75   Resp: 16     Body mass index is 23.63 kg/m².       LUMBAR SPINE:  INSPECTION: no lesions   ROM: normal flexion but limited extension and lateral rotation   MUSCLE SPASM: mild    PARASPINAL TENDERNESS: bilateral   FACET LOADING: bilateral   SI JOINT TENDERNESS:  Bilateral  JI: neg  STRAIGHT LEG RAISE: neg  CROSSED STRAIGHT LEG RAISE: neg  HIP INTERNAL ROM: normal  HIP EXTERNAL ROM: normal   GT TENDERNESS:  Bilateral  Strength intact in all lumbosacral dermatomes 5/5 bilateral  Reflexes 2+ bilateral  Toes upgoing (chronic)    Previous -   General: Well developed, well nourished.  No acute distress.   HEENT: Atraumatic, normocephalic. Poor dentition.   Neck: Trachea midline  Cardiovascular: Vitals reviewed. Normal peripheral perfusion.   Pulmonary: No increased work of breathing.  Abdomen/GI: No guarding  Musculoskeletal: No obvious joint deformities, moves all extremities symmetrically and well.     LUMBAR SPINE:  INSPECTION: no lesions   ROM: normal flexion but limited extension and lateral rotation   MUSCLE SPASM: mild   PARASPINAL TENDERNESS: bilateral   FACET LOADING: bilateral left worse than right   SI JOINT TENDERNESS: neg  JI: neg  STRAIGHT LEG RAISE: neg  CROSSED STRAIGHT LEG RAISE: neg  HIP INTERNAL ROM: normal  HIP EXTERNAL ROM: normal   GT TENDERNESS:       Data Review:     I have personally reviewed the referring provider's notes,  labs, & imaging made available to me today.     RADIOLOGY STUDIES:  I have personally reviewed the pertinent images performed.     7/24/18 MRI thoracic spine without contrast  1.  Severe T9 compression fracture of indeterminate age which is at least partially subacute with minimal bone edema.  2.  Severe L1 compression fracture which appears chronic, with no associated bone marrow edema.  3.  Chronic T11 Schmorl's with no associated bone marrow edema.  4.  Mild multilevel degenerative disc disease  5.  There is no spinal or foraminal stenosis appreciated  6.  Cholelithiasis    07/24/2018 MRI lumbar spine without contrast  1.  Severe chronic L1 compression fracture with no bone marrow edema appreciated  2.  Mild degenerative disc disease and mild to moderate facet arthropathy predominantly lower spine    08/01/2018 NM HIDA scan  1.  No evidence of cystic or common duct obstruction  2.  Normal gallbladder function    6/8/15 MRI lumbar spine w/o contrast:  L1-2: minimal broad based disc protrusion, moderate facet arthropathy  L2-3: mild broad based disc protrusion L > R, annular tear, moderate facet arthropathy, mild stenosis left lateral recess (with mild compression of left L3 root)  L3-4: mild broad based disc protrusion, mild/moderate facet arthropathy   L4-5: mild broad based disc protrusion, moderate/severe facet arthropathy, mild lateral recess stenosis (with mild compression of L5 nerve roots)  L5-S1: transitional vertebra. Mild broad based disc protrusion R > L. Moderate facet arthropathy. Mild/mod compression of right lateral recess with compression of right S1 root.  Chronic L1 fracture   Chronic healed T11 fracture     Results for orders placed or performed during the hospital encounter of 04/03/18   MRI Brain W WO Contrast    Narrative     MRI BRAIN WITH AND WITHOUT CONTRAST    CPT: 26791    Clinical data:  Neurological deficit.  Left hand paralysis.    Comparison: CT head 04/03/2018    Technique:  Multiplanar, multisequence MRI of the brain was performed before and after administration of contrast.     Findings:     The diffusion weighted imaging is negative for acute or subacute ischemia.  Gray-white matter differentiation appears within normal limits.  No extra-axial fluid collection, hydrocephalus, mass effect, midline shift is identified.  There is no acute hemorrhage.  Visualized vascular flow voids appear intact.  Visualized orbits appear unremarkable.  Visualized paranasal sinuses and mastoid air cells appear clear.  No mass is identified.  No pathologic enhancement is noted.  There is demonstrated a shallow sella turcica with minimal pituitary tissue identified.    Impression       1. No acute intracranial abnormality.  2. No pathologic enhancement.      Electronically signed by: NITHIN HOYT MD  Date:     04/03/18  Time:    19:40    CT Head Without Contrast    Narrative    CT scan of the head without contrast    Clinical history: Weakness in the fingers of the left hand    [id number]    Findings: Multiple computerized images of the head were obtained in axial projection using thin slices.  No intravenous contrast was administered.  The study was viewed at soft tissue and bone window settings.  Reconstructions were done in coronal and sagittal planes.    No focal lesions are identified.  There is no evidence of ventricular dilatation, midline shift, or hemorrhage.  No territorial infarct was visualized.  The gray-white junction is maintained throughout.  No extracerebral fluid collection was demonstrated.  The bony calvarium appears intact.  No sinus or mastoid abnormality was noted.  The sella is shallow and very small which is of uncertain significance.  This might be better evaluated with an MRI scan.    Impression     There are no acute findings.  Note is made of the sella being shallow and somewhat small which is of uncertain significance.  An MRI scan may be helpful in further evaluation of  this area.      Electronically signed by: RUTHIE MO MD  Date:     04/03/18  Time:    16:23        Lab Results   Component Value Date     (L) 04/24/2019     (L) 04/24/2019    K 3.9 04/24/2019    K 3.9 04/24/2019    MG 1.7 04/03/2018     04/24/2019     04/24/2019    CO2 26 04/24/2019    CO2 26 04/24/2019    BUN 9 04/24/2019    BUN 9 04/24/2019    CREATININE 0.9 04/24/2019    CREATININE 0.9 04/24/2019    GLU 71 04/24/2019    GLU 71 04/24/2019    HGBA1C 4.9 04/04/2018    AST 41 (H) 04/24/2019    AST 41 (H) 04/24/2019    ALT 12 04/24/2019    ALT 12 04/24/2019    ALBUMIN 3.7 04/24/2019    ALBUMIN 3.7 04/24/2019    PROT 7.1 04/24/2019    PROT 7.1 04/24/2019    BILITOT 0.3 04/24/2019    BILITOT 0.3 04/24/2019       Lab Results   Component Value Date    WBC 5.49 06/17/2018    HGB 11.1 (L) 06/17/2018    HCT 33.1 (L) 06/17/2018    MCV 85 06/17/2018     06/17/2018       Lab Results   Component Value Date    TSH 0.575 04/24/2019       Assessment & Plan:       Problem List Items Addressed This Visit        Neuro    Spondylosis of lumbar region without myelopathy or radiculopathy    Overview     Axial back pain more so that radicular radiation down to knees. 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 Paraspinals tender. SLR negative. SI joint non tender. + facet loading bilaterally. Pain most likely facetogenic given this exam pattern and worse pain with extension / roation.    Reviewed the records from her previous pain physician, patient had never been offered LMBBs before. She is bilateral lumbar medial branch block at L2-3-4-5 on 08/09/2018 and 08/16/2018 with 50% pain relief for approximately 12 hours after both blocks.  Proceeded with RFA of the same nerves on 8/30/18 (left) then 9/6/18 (right) with 50% relief of back  pain    Continue duloxetine for chronic musculoskeletal pain at lower dose since 40mg caused diffuse myoclonus         Relevant Medications    morphine (MS CONTIN) 15 MG 12 hr tablet    Compression fracture of body of thoracic vertebra    Overview     Patient has known chronic T11 and L1 fractures but has a more recent T9 fracture sustained after April 2018.  She does have more upper back pain than previous and I am concerned this may be coming from the new T9 fracture so for this reason I referred her to Neurosurgery but she declined this visit.    Due to progressive numbness in the feet repeat thoracic imaging         Relevant Orders    MRI Thoracic Spine Without Contrast    MRI Lumbar Spine Without Contrast       Psychiatric    Chronically on opiate therapy - Primary    Overview     Patient would like me to take over her chronic pain management. Reviewed her previous pain physician's notes which show good relief and no signs of adverse behavior. She has been on the same regimen for chronic low back pain for years. Her UDS was consistent with prescribed substances. Opiod risk tool shows she is low risk with score 1. Her MME is low at 60 MME daily.     chronic opiate contract on file  Raise MS Contin from 15 nightly to 15 yolanda BID   Continue Percocet 7.5mg/325mg QID prn          Relevant Medications    morphine (MS CONTIN) 15 MG 12 hr tablet       Orthopedic    Chronic bilateral low back pain without sciatica    Overview     Combination of degenerative disc disease facet arthropathy chronic L1 compression fracture.         Relevant Medications    morphine (MS CONTIN) 15 MG 12 hr tablet    SI (sacroiliac) joint inflammation    Overview     Markedly tender were bilateral sacroiliac joints  I think at least in part some of the new pain is SI joint mediated  Not clear that the lumbar facet mediated pain is actually returning  Explained that this would not cause the numbness in her feet that must be a separate  issue    Return for SI joint injection bilateral as she is already on chronic opiates in limited with anti-inflammatories due to chronic steroid use            Other    Numbness and tingling of both legs below knees    Overview     Bilateral L5 distribution tingling though not reproduced on exam  Concerned that this is representing new thoracic or lumbar spine pathology due to her numerous history of compression fractures  Evaluate with updated thoracic and lumbar imaging         Relevant Orders    MRI Thoracic Spine Without Contrast    MRI Lumbar Spine Without Contrast                TESTING:  -- MRI thoracic and lumbar spine to evaluate worsening tingling     REFERRAL:  -- none    PREVENTION OF PAIN:   -- continue duloxetine 20mg daily - take in the MORNING to help with insomnia   -- return to surgery center 5/23/19 for bilateral sacroiliac joint injection (no sedation)     AS-NEEDED TREATMENT OF PAIN:  -- RAISE MS Contin 15mg nightly to TWICE A DAY - use up your current prescription and then when it is running low fill the new paper prescription   -- will refill Percocet 7.5mg/325mg 4x per day #120 on 5/20/19   -- continue Flexeril 10mg twice a day       Follow up in about 1 month (around 6/6/2019).     Kelly Prajapati MD

## 2019-05-09 ENCOUNTER — TELEPHONE (OUTPATIENT)
Dept: ENDOCRINOLOGY | Facility: CLINIC | Age: 44
End: 2019-05-09

## 2019-05-09 RX ORDER — ERGOCALCIFEROL 1.25 MG/1
50000 CAPSULE ORAL
Qty: 4 CAPSULE | Refills: 5 | Status: SHIPPED | OUTPATIENT
Start: 2019-05-09

## 2019-05-09 NOTE — TELEPHONE ENCOUNTER
Can you verify she is taking her thyroid medication by itself and has not missed doses.     Vit D is low.   Needs ergo once weekly x 6 months. Once complete will need OTC vit D 2000 daily

## 2019-05-09 NOTE — TELEPHONE ENCOUNTER
Spoke with pt states she is taking her thyroid medication every morning by itself with water and has not missed any doses  Please advise

## 2019-05-10 DIAGNOSIS — M46.1 SACROILIITIS: Primary | ICD-10-CM

## 2019-05-10 RX ORDER — SODIUM CHLORIDE, SODIUM LACTATE, POTASSIUM CHLORIDE, CALCIUM CHLORIDE 600; 310; 30; 20 MG/100ML; MG/100ML; MG/100ML; MG/100ML
INJECTION, SOLUTION INTRAVENOUS CONTINUOUS
Status: CANCELLED | OUTPATIENT
Start: 2019-05-23

## 2019-05-16 ENCOUNTER — PATIENT MESSAGE (OUTPATIENT)
Dept: ENDOCRINOLOGY | Facility: CLINIC | Age: 44
End: 2019-05-16

## 2019-05-21 ENCOUNTER — HOSPITAL ENCOUNTER (OUTPATIENT)
Dept: RADIOLOGY | Facility: HOSPITAL | Age: 44
Discharge: HOME | End: 2019-05-21
Attending: PSYCHIATRY & NEUROLOGY
Payer: MEDICARE

## 2019-05-21 ENCOUNTER — HOSPITAL ENCOUNTER (OUTPATIENT)
Dept: RADIOLOGY | Facility: HOSPITAL | Age: 44
Discharge: HOME | End: 2019-05-21
Attending: PSYCHIATRY & NEUROLOGY | Admitting: PSYCHIATRY & NEUROLOGY
Payer: MEDICARE

## 2019-05-21 DIAGNOSIS — R20.0 NUMBNESS AND TINGLING OF BOTH LEGS BELOW KNEES: ICD-10-CM

## 2019-05-21 DIAGNOSIS — R20.2 NUMBNESS AND TINGLING OF BOTH LEGS BELOW KNEES: ICD-10-CM

## 2019-05-21 DIAGNOSIS — S22.000A COMPRESSION FRACTURE OF BODY OF THORACIC VERTEBRA: ICD-10-CM

## 2019-05-21 PROCEDURE — 72148 MRI LUMBAR SPINE WITHOUT CONTRAST: ICD-10-PCS | Mod: 26,,, | Performed by: RADIOLOGY

## 2019-05-21 PROCEDURE — 72146 MRI THORACIC SPINE WITHOUT CONTRAST: ICD-10-PCS | Mod: 26,,, | Performed by: RADIOLOGY

## 2019-05-21 PROCEDURE — 72148 MRI LUMBAR SPINE W/O DYE: CPT | Mod: 26,,, | Performed by: RADIOLOGY

## 2019-05-21 PROCEDURE — 72146 MRI CHEST SPINE W/O DYE: CPT | Mod: 26,,, | Performed by: RADIOLOGY

## 2019-05-21 PROCEDURE — 72148 MRI LUMBAR SPINE W/O DYE: CPT | Mod: TC,PO

## 2019-05-21 PROCEDURE — 72146 MRI CHEST SPINE W/O DYE: CPT | Mod: TC,PO

## 2019-05-22 ENCOUNTER — TELEPHONE (OUTPATIENT)
Dept: SURGERY | Facility: HOSPITAL | Age: 44
End: 2019-05-22

## 2019-05-22 DIAGNOSIS — M51.36 DDD (DEGENERATIVE DISC DISEASE), LUMBAR: Primary | ICD-10-CM

## 2019-05-22 NOTE — TELEPHONE ENCOUNTER
Patient states she takes Hydrocortisone everyday, as well as MS contin and Hydrocodone.  Instructed patient that if she needs pain meds the morning of surgery, to be sure and let pre op nurse and Surgeon know.  Just wanted to make sure there wasn't a problem with the steroid.  Please advise patient if so.  Thanks

## 2019-05-23 ENCOUNTER — HOSPITAL ENCOUNTER (OUTPATIENT)
Dept: RADIOLOGY | Facility: HOSPITAL | Age: 44
Discharge: HOME | End: 2019-05-23
Attending: PSYCHIATRY & NEUROLOGY | Admitting: PSYCHIATRY & NEUROLOGY
Payer: MEDICARE

## 2019-05-23 ENCOUNTER — TELEPHONE (OUTPATIENT)
Dept: NEUROLOGY | Facility: CLINIC | Age: 44
End: 2019-05-23

## 2019-05-23 ENCOUNTER — HOSPITAL ENCOUNTER (OUTPATIENT)
Facility: HOSPITAL | Age: 44
Discharge: HOME | End: 2019-05-23
Attending: PSYCHIATRY & NEUROLOGY | Admitting: PSYCHIATRY & NEUROLOGY
Payer: MEDICARE

## 2019-05-23 VITALS
RESPIRATION RATE: 16 BRPM | OXYGEN SATURATION: 100 % | TEMPERATURE: 97 F | HEART RATE: 82 BPM | SYSTOLIC BLOOD PRESSURE: 91 MMHG | DIASTOLIC BLOOD PRESSURE: 62 MMHG

## 2019-05-23 DIAGNOSIS — M46.1 SACROILIITIS: Primary | ICD-10-CM

## 2019-05-23 DIAGNOSIS — R20.2 NUMBNESS AND TINGLING OF BOTH LEGS BELOW KNEES: Primary | ICD-10-CM

## 2019-05-23 DIAGNOSIS — R20.0 NUMBNESS AND TINGLING OF BOTH LEGS BELOW KNEES: Primary | ICD-10-CM

## 2019-05-23 DIAGNOSIS — M51.36 DDD (DEGENERATIVE DISC DISEASE), LUMBAR: ICD-10-CM

## 2019-05-23 PROCEDURE — 76000 FLUOROSCOPY <1 HR PHYS/QHP: CPT | Mod: TC,PO

## 2019-05-23 PROCEDURE — 27096 PR INJECTION,SACROILIAC JOINT: ICD-10-PCS | Mod: 50,,, | Performed by: PSYCHIATRY & NEUROLOGY

## 2019-05-23 PROCEDURE — 27096 INJECT SACROILIAC JOINT: CPT | Mod: 50,PO | Performed by: PSYCHIATRY & NEUROLOGY

## 2019-05-23 PROCEDURE — 63600175 PHARM REV CODE 636 W HCPCS: Mod: PO | Performed by: PSYCHIATRY & NEUROLOGY

## 2019-05-23 PROCEDURE — 27096 INJECT SACROILIAC JOINT: CPT | Mod: 50,,, | Performed by: PSYCHIATRY & NEUROLOGY

## 2019-05-23 PROCEDURE — 25000003 PHARM REV CODE 250: Mod: PO | Performed by: PSYCHIATRY & NEUROLOGY

## 2019-05-23 RX ORDER — BUPIVACAINE HYDROCHLORIDE 2.5 MG/ML
INJECTION, SOLUTION EPIDURAL; INFILTRATION; INTRACAUDAL
Status: DISCONTINUED | OUTPATIENT
Start: 2019-05-23 | End: 2019-05-23 | Stop reason: HOSPADM

## 2019-05-23 RX ORDER — SODIUM CHLORIDE, SODIUM LACTATE, POTASSIUM CHLORIDE, CALCIUM CHLORIDE 600; 310; 30; 20 MG/100ML; MG/100ML; MG/100ML; MG/100ML
INJECTION, SOLUTION INTRAVENOUS CONTINUOUS
Status: DISCONTINUED | OUTPATIENT
Start: 2019-05-23 | End: 2019-05-23

## 2019-05-23 RX ORDER — LIDOCAINE HYDROCHLORIDE 10 MG/ML
INJECTION, SOLUTION EPIDURAL; INFILTRATION; INTRACAUDAL; PERINEURAL
Status: DISCONTINUED | OUTPATIENT
Start: 2019-05-23 | End: 2019-05-23 | Stop reason: HOSPADM

## 2019-05-23 RX ORDER — METHYLPREDNISOLONE ACETATE 80 MG/ML
INJECTION, SUSPENSION INTRA-ARTICULAR; INTRALESIONAL; INTRAMUSCULAR; SOFT TISSUE
Status: DISCONTINUED | OUTPATIENT
Start: 2019-05-23 | End: 2019-05-23 | Stop reason: HOSPADM

## 2019-05-23 NOTE — OP NOTE
PROCEDURE:  Bilateral sacroiliac joint injection utilizing fluoroscopy.    CPT:   82261 - 50    DIAGNOSIS: Bilateral sacroiliitis    Post op diagnosis: same    PHYSICIAN: Kelly Prajapati MD    MEDICATIONS INJECTED:  Methylprednisone 80mg and 9ml Bupivacaine 0.25%    LOCAL ANESTHETIC USED: Lidocaine 1%, 4ml total      SEDATION MEDICATIONS: none     ESTIMATED BLOOD LOSS: <5cc    COMPLICATIONS: none     TECHNIQUE:   Time-out taken to identify patient and procedure side prior to starting the procedure. After placing the patient in the prone position, the patient was prepped and draped in the usual sterile fashion using ChloraPrep and sterile towels.  The area was determined under fluoroscopy in the AP view.  Lidocaine was injected by raising a wheal and going down to the periosteum using a 25-gauge 1.5 inch needle.  The 3.5 inch 22-gauge spinal needle was introduced into inferior opening of the left sacroiliac joint.  Negative pressure applied to confirm no intravascular placement. The medication was then injected slowly (5cc per joint). The same exact procedure was repeated on the right side. The patient tolerated the procedure well.    The patient was monitored after the procedure.  The patient was given post procedure and discharge instructions to follow at home. The patient was discharged in a stable condition

## 2019-05-23 NOTE — DISCHARGE SUMMARY
[facility]  Discharge Note  Short Stay    Admit Date: 5/23/2019    Discharge Date: 5/23/2019    Attending Physician: Kelly Prajapati MD     Discharge Provider: Kelly Prajapati    Diagnoses:  Active Hospital Problems    Diagnosis  POA    *Sacroiliitis [M46.1]  Yes      Resolved Hospital Problems   No resolved problems to display.       Discharged Condition: good    Final Diagnoses: Sacroiliitis [M46.1]    Disposition: Home or Self Care    Hospital Course: no complications, uneventful    Outcome of Hospitalization, Treatment, Procedure, or Surgery:  Patient was admitted for outpatient procedure. The patient underwent procedure without complications and are discharged home    Follow up/Patient Instructions:  Follow up as scheduled/Patient has received instructions and follow up date    Medications:  Continue previous medications    Discharge Procedure Orders   Notify your health care provider if you experience any of the following:  temperature >100.4     Notify your health care provider if you experience any of the following:  persistent nausea and vomiting or diarrhea     Notify your health care provider if you experience any of the following:  severe uncontrolled pain     Notify your health care provider if you experience any of the following:  redness, tenderness, or signs of infection (pain, swelling, redness, odor or green/yellow discharge around incision site)     Notify your health care provider if you experience any of the following:  difficulty breathing or increased cough     Notify your health care provider if you experience any of the following:  severe persistent headache     Notify your health care provider if you experience any of the following:  worsening rash     Notify your health care provider if you experience any of the following:  persistent dizziness, light-headedness, or visual disturbances     Notify your health care provider if you experience any of the following:  increased confusion  or weakness     No dressing needed         Discharge Procedure Orders (must include Diet, Follow-up, Activity):   Discharge Procedure Orders (must include Diet, Follow-up, Activity)   Notify your health care provider if you experience any of the following:  temperature >100.4     Notify your health care provider if you experience any of the following:  persistent nausea and vomiting or diarrhea     Notify your health care provider if you experience any of the following:  severe uncontrolled pain     Notify your health care provider if you experience any of the following:  redness, tenderness, or signs of infection (pain, swelling, redness, odor or green/yellow discharge around incision site)     Notify your health care provider if you experience any of the following:  difficulty breathing or increased cough     Notify your health care provider if you experience any of the following:  severe persistent headache     Notify your health care provider if you experience any of the following:  worsening rash     Notify your health care provider if you experience any of the following:  persistent dizziness, light-headedness, or visual disturbances     Notify your health care provider if you experience any of the following:  increased confusion or weakness     No dressing needed

## 2019-05-23 NOTE — TELEPHONE ENCOUNTER
----- Message from Kelly Prajapati MD sent at 5/23/2019  8:50 AM CDT -----   Please call patient Friday/next business day to -    Joint in injection (SI, hip etc) - check on how patient is doing. Determine relief if any. Remind that full relief will take up to 2 weeks.     Please schedule NCS/EMG with Dr Alonso kuhnteral lower extremities. Follow up in clinic after, that can also count as her procedure follow up

## 2019-05-24 ENCOUNTER — TELEPHONE (OUTPATIENT)
Dept: NEUROLOGY | Facility: CLINIC | Age: 44
End: 2019-05-24

## 2019-05-27 ENCOUNTER — TELEPHONE (OUTPATIENT)
Dept: NEUROLOGY | Facility: CLINIC | Age: 44
End: 2019-05-27

## 2019-05-27 ENCOUNTER — PATIENT MESSAGE (OUTPATIENT)
Dept: ENDOCRINOLOGY | Facility: CLINIC | Age: 44
End: 2019-05-27

## 2019-05-27 DIAGNOSIS — E23.0 PANHYPOPITUITARISM: Primary | ICD-10-CM

## 2019-05-27 NOTE — TELEPHONE ENCOUNTER
Called pt to schedule pt for EMG per Dr. Prajapati with Dr. Gupta; no answer and no option to leave voicemail.

## 2019-05-28 NOTE — TELEPHONE ENCOUNTER
Spoke to pt and adv of Dr Krishnan's previous message. Scheduled non fasting labs, adv date and location, voiced understanding.

## 2019-05-28 NOTE — TELEPHONE ENCOUNTER
She does have osteoporosis that warrants Treatment. At our last visit she mentioned seeing the dentist to see what dental care is needed. If planning on doing any dental procedure may be worth doing that 1st and then starting medication for bones.   As fas as the thyroid levels- Check FT4, T3 in 4 weeks now that taking it by itself

## 2019-06-12 ENCOUNTER — PATIENT MESSAGE (OUTPATIENT)
Dept: NEUROLOGY | Facility: CLINIC | Age: 44
End: 2019-06-12

## 2019-06-12 DIAGNOSIS — M54.50 CHRONIC BILATERAL LOW BACK PAIN WITHOUT SCIATICA: ICD-10-CM

## 2019-06-12 DIAGNOSIS — G89.29 CHRONIC BILATERAL LOW BACK PAIN WITHOUT SCIATICA: ICD-10-CM

## 2019-06-12 DIAGNOSIS — Z79.891 CHRONICALLY ON OPIATE THERAPY: ICD-10-CM

## 2019-06-12 RX ORDER — MORPHINE SULFATE 15 MG/1
15 TABLET, FILM COATED, EXTENDED RELEASE ORAL 2 TIMES DAILY
Qty: 12 TABLET | Refills: 0 | Status: SHIPPED | OUTPATIENT
Start: 2019-06-13 | End: 2019-06-19

## 2019-06-12 RX ORDER — MORPHINE SULFATE 15 MG/1
15 TABLET, FILM COATED, EXTENDED RELEASE ORAL 2 TIMES DAILY
Qty: 60 TABLET | Refills: 0 | Status: SHIPPED | OUTPATIENT
Start: 2019-06-19 | End: 2019-07-16 | Stop reason: SDUPTHER

## 2019-06-12 RX ORDER — OXYCODONE AND ACETAMINOPHEN 7.5; 325 MG/1; MG/1
1 TABLET ORAL EVERY 6 HOURS PRN
Qty: 120 TABLET | Refills: 0 | Status: SHIPPED | OUTPATIENT
Start: 2019-06-19 | End: 2019-07-16 | Stop reason: SDUPTHER

## 2019-06-12 NOTE — TELEPHONE ENCOUNTER
MS contin 15 b.i.d. failed for 6/13 to 6/19 #12    Both Percocet 7.5 #120 and MS contin 15 BID filled 6/19 to 7/19

## 2019-06-13 ENCOUNTER — PROCEDURE VISIT (OUTPATIENT)
Dept: NEUROLOGY | Facility: CLINIC | Age: 44
End: 2019-06-13
Payer: MEDICARE

## 2019-06-13 DIAGNOSIS — G72.9 MYOPATHY: Primary | ICD-10-CM

## 2019-06-13 DIAGNOSIS — R20.2 NUMBNESS AND TINGLING OF BOTH LEGS BELOW KNEES: ICD-10-CM

## 2019-06-13 DIAGNOSIS — R20.0 NUMBNESS AND TINGLING OF BOTH LEGS BELOW KNEES: ICD-10-CM

## 2019-06-13 PROCEDURE — 95911 NRV CNDJ TEST 9-10 STUDIES: CPT | Mod: S$GLB,,, | Performed by: PSYCHIATRY & NEUROLOGY

## 2019-06-13 PROCEDURE — 95911 PR NERVE CONDUCTION STUDY; 9-10 STUDIES: ICD-10-PCS | Mod: S$GLB,,, | Performed by: PSYCHIATRY & NEUROLOGY

## 2019-06-13 PROCEDURE — 95886 MUSC TEST DONE W/N TEST COMP: CPT | Mod: S$GLB,,, | Performed by: PSYCHIATRY & NEUROLOGY

## 2019-06-13 PROCEDURE — 95886 PR EMG COMPLETE, W/ NERVE CONDUCTION STUDIES, 5+ MUSCLES: ICD-10-PCS | Mod: S$GLB,,, | Performed by: PSYCHIATRY & NEUROLOGY

## 2019-06-13 NOTE — PROCEDURES
[facility]   [facility]  [address] [place] [address]  [phone]             Full Name: Radha Ramirez Gender: Female  Patient ID: [id number] YOB: 1975      Visit Date: 6/13/2019 11:22  Age: 43 Years 9 Months Old  Examining Physician: Deepali Gupta D.O., ABPN, AOBNP, ABEM   Referring Physician: Kelly Prajapati M.D.   Height: 5 feet 3 inch  History: Patient complains of numbness/tingling and pains in both legs from the knee down.  She also complains of back pain.  She has some weakness.  Evaluate for neuropathy versus radiculopathy.      Sensory NCS      Nerve / Sites Rec. Site Onset Lat Peak Lat NP Amp Segments Distance Velocity Temp.     ms ms µV  cm m/s °C   R Sural - Ankle (Calf)      Calf Ankle NR NR NR Calf - Ankle 14 NR 35      Ref.   ?4.50 ?5.0 Ref.  ?40    L Sural - Ankle (Calf)      Calf Ankle 2.81 3.33 6.3 Calf - Ankle 14 50 35      Ref.   ?4.50 ?5.0 Ref.  ?40    R Superficial peroneal - Ankle      Lat leg Ankle 3.07 3.75 14.5 Lat leg - Ankle 12 39 35      Ref.   ?4.50 ?5.0 Ref.      L Superficial peroneal - Ankle      Lat leg Ankle 2.55 3.18 5.6 Lat leg - Ankle 12 47 35      Ref.   ?4.50 ?5.0 Ref.          Motor NCS      Nerve / Sites Muscle Latency Ref. Amplitude Ref. Amp % Duration Segments Distance Lat Diff Velocity Ref. Temp.     ms ms mV mV % ms  cm ms m/s m/s °C   R Peroneal - EDB      Ankle EDB 7.29 ?5.50 0.6 ?3.0 100 2.76 Ankle - EDB     35   L Peroneal - EDB      Ankle EDB 4.17 ?5.50 0.8 ?3.0 100 6.72 Ankle - EDB     35   R Peroneal - Tib Ant      Fib Head Tib Ant 2.34 ?4.00 4.3 ?4.0 100 12.19 Fib Head - Tib Ant     35      Pop fossa Tib Ant 4.06  3.8  88.9 12.97 Pop fossa - Fib Head 9 1.72 52 ?40 35   L Peroneal - Tib Ant      Fib Head Tib Ant 2.24 ?4.00 3.7 ?4.0 100 8.07 Fib Head - Tib Ant     35      Pop fossa Tib Ant 3.75  4.0  108 8.59 Pop fossa - Fib Head 8 1.51 53 ?40 35   R Tibial - AH      Ankle AH 6.15 ?6.00 1.1 ?3.0  100 7.40 Ankle - AH     35      Pop fossa AH 13.49  1.4  124 9.38 Pop fossa - Ankle 39 7.34 53 ?40 35   L Tibial - AH      Ankle AH 5.83 ?6.00 1.2 ?3.0 100 3.91 Ankle - AH     35      Pop fossa AH 15.73  1.5  128  Pop fossa - Ankle 39 9.90 39 ?40 35       EMG Summary Table     Spontaneous Recruitment Activation Duration Amplitude Polyphasia Comment   Muscle Ins Act Fib Fasc Pattern - - - - -   R. Tibialis anterior Normal 0 0 Normal Normal Normal Normal Normal Normal   R. Gastrocnemius (Medial head) Normal 0 0 Normal Normal Normal Normal Normal Normal   R. Extensor hallucis longus Normal 0 0 Sl Dec Normal N/+1 N/+1 N/+1 Normal   R. Flexor digitorum longus Normal 0 0 Normal Normal Normal Normal Normal Normal   R. Vastus medialis Normal 0 0 Early Normal N/-1 N/-1 N/+1 Myopathic   R. Tensor fasciae latae Normal 0 0 Early Normal N/-1 N/-1 N/+1 Myopathic   R. Biceps femoris (short head) Normal 0 0 Normal Normal Normal Normal Normal Normal   R. Lumbar paraspinals Normal 0 0 Early Normal N/-1 N/-1 N/+1 Myopathic   L. Tibialis anterior Normal 0 0 Normal Normal Normal Normal Normal Normal   L. Gastrocnemius (Medial head) Normal 0 0 Normal Normal Normal Normal Normal Normal   L. Extensor hallucis longus Normal 0 0 Sl Dec Normal N/+1 N/+1 N/+1 Normal   L. Flexor digitorum longus Normal 0 0 Normal Normal Normal Normal Normal Normal   L. Vastus medialis Normal 0 0 Normal Normal Normal Normal Normal Normal   L. Tensor fasciae latae Normal 0 0 Early Normal N/-1 N/-1 N/+1 Myopathic   L. Biceps femoris (short head) Normal 0 0 Normal Normal Normal Normal Normal Normal   L. Lumbar paraspinals Normal 0 0 Early Normal N/-1 N/-1 N/+1 Myopathic         Summary:  Nerve conduction studies were performed on both lower extremities.  The exam is somewhat complicated by patient's low tolerance for pain as well as body habitus including localized adiposity and edema (particularly at the ankles).  Right sural sensory response was absent.  Left  sural sensory response was normal in amplitude and latency.  Bilateral superficial peroneal sensory responses were normal in amplitude and latency, however the left was significantly decreased as compared to the right.  Bilateral peroneal motor responses recording the extensor digitorum brevis were markedly reduced in amplitude with slowing of the velocity on the right.  Bilateral peroneal motor responses recording the tibialis anterior muscle was normal in latency was slight reduction in amplitude on the right and normal velocities.  Bilateral tibial motor studies were reduced in amplitude with borderline slowing of the latency on right and slowing of the velocity on the left.  Needle EMG was performed in both lower extremities and lumbar paraspinal muscles.  No active denervation was present in any muscle tested.  Motor units were enlarged and poly phasic with reduced recruitment in the extensor hallucis longus muscles bilaterally.  Motor units were myopathic in appearance in the right vastus medialis, tensor fascia elodia and lumbar paraspinal muscles.  In addition motor units were myopathic appearance on the left tensor fascia elodia and lumbar paraspinal muscles.  All other motor unit morphology and recruitment patterns were normal.    Impression:  This is an abnormal EMG of the lower extremities. As noted above there was some complexity to the examination.  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  In addition, there are findings that are suggestive, but not diagnostic of a mild peripheral polyneuropathy.  This study does not support a diagnosis of lumbar radiculopathy and there is no evidence of focal neuropathy on this study.  Given her recent onset of symptoms, a repeat study can be performed in 3 months if clinically warranted.      Thank you for referring to the [facility] in [place].  Please feel free  to contact the clinic if you have any further questions regarding this study or report.        ____________________________  Deepali Gupta D.O., ELIAS, AOCORINAP, KINGA

## 2019-06-17 ENCOUNTER — PATIENT MESSAGE (OUTPATIENT)
Dept: NEUROLOGY | Facility: CLINIC | Age: 44
End: 2019-06-17

## 2019-06-24 ENCOUNTER — TELEPHONE (OUTPATIENT)
Dept: NEUROLOGY | Facility: CLINIC | Age: 44
End: 2019-06-24

## 2019-06-24 NOTE — TELEPHONE ENCOUNTER
Returned patients call in regards to tailbone pain and falling on it. Informed patient Dr Prajapati is out of clinic this week and she can call her PCP in regards to tailbone pain and needing an x ray. Patient verbalized understanding.

## 2019-06-24 NOTE — TELEPHONE ENCOUNTER
----- Message from Melina Klein sent at 6/24/2019 12:51 PM CDT -----  Contact: Radha pettit  Type: Needs Medical Advice    Who Called:  Radha  Symptoms (please be specific):  She fell on her tailbone last week. Its still hurting  How long has patient had these symptoms:  Last week  Best Call Back Number: [phone]  Additional Information: She is requesting to see if she can go do some xrays

## 2019-07-10 ENCOUNTER — PATIENT MESSAGE (OUTPATIENT)
Dept: NEUROLOGY | Facility: CLINIC | Age: 44
End: 2019-07-10

## 2019-07-15 ENCOUNTER — PATIENT MESSAGE (OUTPATIENT)
Dept: NEUROLOGY | Facility: CLINIC | Age: 44
End: 2019-07-15

## 2019-07-15 DIAGNOSIS — G89.29 CHRONIC BILATERAL LOW BACK PAIN WITHOUT SCIATICA: ICD-10-CM

## 2019-07-15 DIAGNOSIS — Z79.891 CHRONICALLY ON OPIATE THERAPY: ICD-10-CM

## 2019-07-15 DIAGNOSIS — M54.50 CHRONIC BILATERAL LOW BACK PAIN WITHOUT SCIATICA: ICD-10-CM

## 2019-07-15 RX ORDER — GABAPENTIN 300 MG/1
300 CAPSULE ORAL NIGHTLY
Qty: 30 CAPSULE | Refills: 11 | Status: SHIPPED | OUTPATIENT
Start: 2019-07-15 | End: 2019-07-26 | Stop reason: DRUGHIGH

## 2019-07-16 RX ORDER — MORPHINE SULFATE 15 MG/1
15 TABLET, FILM COATED, EXTENDED RELEASE ORAL 2 TIMES DAILY
Qty: 60 TABLET | Refills: 0 | Status: SHIPPED | OUTPATIENT
Start: 2019-07-19 | End: 2019-08-15 | Stop reason: SDUPTHER

## 2019-07-16 RX ORDER — OXYCODONE AND ACETAMINOPHEN 7.5; 325 MG/1; MG/1
1 TABLET ORAL EVERY 6 HOURS PRN
Qty: 120 TABLET | Refills: 0 | Status: SHIPPED | OUTPATIENT
Start: 2019-07-19 | End: 2019-08-15 | Stop reason: SDUPTHER

## 2019-07-19 ENCOUNTER — PATIENT MESSAGE (OUTPATIENT)
Dept: NEUROLOGY | Facility: CLINIC | Age: 44
End: 2019-07-19

## 2019-07-26 ENCOUNTER — PATIENT MESSAGE (OUTPATIENT)
Dept: NEUROLOGY | Facility: CLINIC | Age: 44
End: 2019-07-26

## 2019-07-26 ENCOUNTER — OFFICE VISIT (OUTPATIENT)
Dept: NEUROLOGY | Facility: CLINIC | Age: 44
End: 2019-07-26
Payer: MEDICARE

## 2019-07-26 VITALS
HEART RATE: 78 BPM | HEIGHT: 63 IN | SYSTOLIC BLOOD PRESSURE: 102 MMHG | WEIGHT: 128.06 LBS | BODY MASS INDEX: 22.69 KG/M2 | RESPIRATION RATE: 18 BRPM | DIASTOLIC BLOOD PRESSURE: 73 MMHG

## 2019-07-26 DIAGNOSIS — G72.9 MYOPATHY: ICD-10-CM

## 2019-07-26 DIAGNOSIS — Z79.891 CHRONICALLY ON OPIATE THERAPY: ICD-10-CM

## 2019-07-26 DIAGNOSIS — R20.2 NUMBNESS AND TINGLING OF BOTH LEGS BELOW KNEES: ICD-10-CM

## 2019-07-26 DIAGNOSIS — S22.000A COMPRESSION FRACTURE OF BODY OF THORACIC VERTEBRA: Primary | ICD-10-CM

## 2019-07-26 DIAGNOSIS — R20.0 NUMBNESS AND TINGLING OF BOTH LEGS BELOW KNEES: ICD-10-CM

## 2019-07-26 DIAGNOSIS — E23.0 PANHYPOPITUITARISM: ICD-10-CM

## 2019-07-26 PROCEDURE — 99999 PR PBB SHADOW E&M-EST. PATIENT-LVL IV: ICD-10-PCS | Mod: PBBFAC,,, | Performed by: PSYCHIATRY & NEUROLOGY

## 2019-07-26 PROCEDURE — 3008F PR BODY MASS INDEX (BMI) DOCUMENTED: ICD-10-PCS | Mod: CPTII,S$GLB,, | Performed by: PSYCHIATRY & NEUROLOGY

## 2019-07-26 PROCEDURE — 99214 PR OFFICE/OUTPT VISIT, EST, LEVL IV, 30-39 MIN: ICD-10-PCS | Mod: S$GLB,,, | Performed by: PSYCHIATRY & NEUROLOGY

## 2019-07-26 PROCEDURE — 99214 OFFICE O/P EST MOD 30 MIN: CPT | Mod: S$GLB,,, | Performed by: PSYCHIATRY & NEUROLOGY

## 2019-07-26 PROCEDURE — 3008F BODY MASS INDEX DOCD: CPT | Mod: CPTII,S$GLB,, | Performed by: PSYCHIATRY & NEUROLOGY

## 2019-07-26 PROCEDURE — 99999 PR PBB SHADOW E&M-EST. PATIENT-LVL IV: CPT | Mod: PBBFAC,,, | Performed by: PSYCHIATRY & NEUROLOGY

## 2019-07-26 RX ORDER — GABAPENTIN 100 MG/1
100 CAPSULE ORAL NIGHTLY
Qty: 30 CAPSULE | Refills: 11 | Status: SHIPPED | OUTPATIENT
Start: 2019-07-26 | End: 2020-07-25

## 2019-07-26 NOTE — PROGRESS NOTES
Date of service: 7/26/2019  Referring provider: No ref. provider found    Subjective:      Chief complaint: Back Pain       Patient ID: Radha Ramirez is a 43 y.o. 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 Gupta which showed a mild proximal myopathy in the lower extremities, presumably from her chronic steroid therapy.  There was no neuropathy and there is no radiculopathy.    Today she reports she is worse.  She fell approximately 1 month ago (late June) and the pain in her lower back got worse., especially on the left side.  Her primary ordered x-rays and found that she had a new compression fracture at T11.    Pain is in the low back, off to the left, radiating down the anterior left thigh down to the knee.  Her current pain score is 8 with range of 7-10.    I prescribed her gabapentin 300 mg q.h.s. in the interim when she notified me of this new pain but she could not take this dose at all, it made her extremely sedated in conjunction with her chronic opiate therapy and Flexeril.  Her stable dose of chronic opiate therapy has not helped this new pain, mom gave her a Tylenol No.  3 which took the edge off and on 2 occasions patient doubled up on her Percocet with better relief.     There is no numbness or tingling.    She does note that her posture has worsened since.    INTERVAL HISTORY - 5/8/19     At last visit three months ago she was stable and we made no changes to her regimen.  Today she reports she is a little worse.  She feels that the lumbar facet mediated pain is returning and she does not think her pain medications are working as well as previously (percocet lasting only 1-2 hours).  Her pain is located in the middle and low back. Her current pain score  seven with a range of 4-9.  She remains on duloxetine.  She remains on Percocet 7.5 mg q.i.d. and morphine XR 15 mg nightly.    She reports she noted a bruise on her back last week. She does not know how it got there, she does not recall falling or hitting anything. The pain that is present is in the sacrum radiating into the buttocks / hips. There is tingling pain in the thighs and numbness in the feet (dorsum) right much worse than left. No bowel bladder changes.       INTERVAL HISTORY - 2/28/19    Today she reports she is a little bit better.  The lumbar facet mediated pain continues to be fine.  She continues to experience bone pain in the middle of her back to the compression fracture.  She has not consulted with Neurosurgery due to chickening out.  Her chronic opiate therapy continues to be effective.  She has intermittent constipation that she is managing with MiraLax.  Duloxetine 20 mg is effective for pain. She takes it at night.  She does noted makes her a little bit jittery.  She has been having insomnia related to health issues with her mom.  She initially thought this was Percocet interfering with her sleep and she taper down but her sleep did not improve. Her current pain score is four with a range 4 to 9.  Sometimes the left hand feels weak with grasp.    INTERVAL HISTORY - 9/21/18    She is status post lumbar medial branch nerve RFA at L2-3-4-5 on 8/30/18 (left) then 9/6/18 (right). In the interim she had developed some myoclonic twitching in the lower more than upper extremities which I suspected was probably from duloxetine so I asked her to stop and then resume at lower dose.     She reports that since the RFA her back pain has lessened but now she feels her bone pain more. She rates her relief as 50%. The pain continues to be in the lower back. Her pain score is 5 with a range of 4-10.    The twitching did lessen on the Cymbalta 20 mg.    She will see Dr. Cooper on 10/16/18 2 discussed the  vertebroplasty or kyphoplasty    INTERVAL HISTORY - 8/17/18     She is status post bilateral lumbar medial branch block at L2-3-4-5 on 08/09/2018 and 08/16/2018 with 50% pain relief for approximately 12 hours after both blocks. During that time she was able to stand up and cook herself a meal which she is normally unable to do. She was less sedentary and able to stand more than normal and was able to grocery shop.  She would like to proceed with RFA of these nerves.    In the interim I received imaging records from [facility] specifically her MRI thoracic spine without contrast which shows that she has a severe T9 compression fracture of indeterminate age but we do know it is new since 04/13/2018 and was already seen on x-ray on 06/16/2018.  She does have mid back pain but the most severe of her pains in the lower spine.  She also has the chronic severe L1 compression fracture.    She continues on her chronic opiate regimen with good relief no adverse effects and no Advair behaviors.  If her current pain score 5 with a range of 3-10.    INTERVAL HISTORY - 7/20/18    UDS done last visit 6/18/18 concordant with prescribed morphine, oxycodone and fentanyl given in ED day before    Today she reports she is about the same. Pain is in the middle and lower back, currently 6/10, range 4 to 10.     She reports she had new Xrays done showing new compression fractures with her PCP. She is pending an MRI. She will also be getting a bone density scan.    Current back pain is 6/10 with range of 4 to 10.     INTERVAL HISTORY - 6/18/18     The below problem (posterior interosseus neuropathy) has completely resolved and did so really a week later after onset. She has had no new weakness occur.     She currently follows for chronic pain management with neurologist Dr. Hunter Gayle in [facility] but requests to change to me to have closer to home provider. Her main pain is axial back pain with radiation down the legs  to the level of the knees. Her current pain score is 8. She last filled her medications on 5/26. Her stable regimen is listed below. Her mother expresses concern as to whether anything else can be done to minimize her medications. She reports she cannot get ESIs due to her chronic steroid use. She reports she has never received lumbar facet blocks / RFAs.    Her pain is currently flared because she recently became dizzy in the setting of UTI, poor PO intake, and fell, on her sacrum. Was evaluated in the ED. XR showed chronic L1 fracture, age indeterminate but new since 4/3/18 T9 fracture, and possible fracture at the sacrococcygeal junction. She was discharged after having been given toradol and percocet in the ED.     ORIGINAL NEURO HISTORY - 4/5/18   Patient woke up yesterday 6am to use restroom, noticed left hand was weak. Went back to sleep woke up at 1030 am, still weak. Reports she cannot extend the fingers. Wrsit ok. NO PAIN. There is no numbness or tingling at all. She slept on her back, with the left wrist extended and under her chin, but was never on her humerus. No previous episodes.      Incidentally found to have optic nerve thinning by eye doctor - undergoing visual fields for further diagnosis.      Takes MS contin, percocet, benadryl, flexeril before bed but all doses were typical. No alcohol before bed.     HOSPITAL COURSE:  4/5/18: no clinical changes. OT recommended outpatient therapy. MRI cervical done and normal. No pain.      #1 Left hand weakness - occurring after waking up after sleeping with left wrist extended supporting the chin. No numbness at all and no pain. Because this initially appeared to me to be involvement of 3 separate peripheral nerves (median, ulnar, radial) or C7-T1 roots and patient has bilateral babinski I recommend cervical spine imaging with contrast which was completely normal. 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 This single nerve explains all her motor weakness and as it is a pure motor nerve explains why she has no sensory loss. Her inflammatory markers are elevated but the sudden onset and complete lack of pain argue against a mononeuritis multilpex or an early parsonage reina as the diagnosis. Thus, I do think this is compressive, from a position she sustained while sleeping. I expect this to resolve or markedly improve within 3 months.     Current acute treatment:  -- MS Contin 15mg BID  -- Percocet 7.5mg/325mg QID     Current prevention:  -- duloxetine 20 mg - some jitteriness (40mg caused myoclonus)  -- gabapentin 300mg qHS - could not tolerate stopped taking    Previously tried:   -- none    Procedural history:   -- bilateral lumbar medial branch block at L2-3-4-5 on 08/09/2018 and 08/16/2018 with 50% pain relief for approximately 12 hours after both blocks.  -- lumbar RFA at L2-3-4-5 on 8/30/18 (left) then 9/6/18 (right) - 50% relief of low back pain   -- 5/23/19 bilateral SI joint      Review of patient's allergies indicates:  No Known Allergies  Current Outpatient Medications   Medication Sig Dispense Refill    benzonatate (TESSALON PERLES ORAL) Take by mouth.      calcitRIOL (ROCALTROL) 0.5 MCG Cap Take 0.5 mcg by mouth every Mon, Wed, Fri.      cefdinir (OMNICEF) 300 MG capsule Take 300 mg by mouth 2 (two) times daily.      dicyclomine (BENTYL) 20 mg tablet Take 20 mg by mouth 4 (four) times daily as needed.      DULoxetine (CYMBALTA) 20 MG capsule Take 2 capsules (40 mg total) by mouth once daily. 60 capsule 11    ergocalciferol (ERGOCALCIFEROL) 50,000 unit  "Cap Take 1 capsule (50,000 Units total) by mouth every 7 days. 4 capsule 5    hydrocortisone (CORTEF) 20 MG Tab Take 20 mg by mouth once daily.       levothyroxine (SYNTHROID, LEVOTHROID) 175 MCG tablet Take 175 mcg by mouth once daily.      liothyronine (CYTOMEL) 5 MCG Tab Take 25 mcg by mouth once daily.      morphine (MS CONTIN) 15 MG 12 hr tablet Take 1 tablet (15 mg total) by mouth 2 (two) times daily. 60 tablet 0    oxyCODONE-acetaminophen (PERCOCET) 7.5-325 mg per tablet Take 1 tablet by mouth every 6 (six) hours as needed for Pain. 120 tablet 0    promethazine (PHENERGAN) 25 MG tablet Take 25 mg by mouth every 4 (four) hours.      gabapentin (NEURONTIN) 100 MG capsule Take 1 capsule (100 mg total) by mouth every evening. 30 capsule 11     No current facility-administered medications for this visit.        Past Medical History  Past Medical History:   Diagnosis Date    Adrenal insufficiency     Arthritis     "Bulging and herniated lumbar discs"     Chronic instability of knee     Encounter for blood transfusion     Humerus fracture     Panhypopituitarism     Pelvic fracture     Rib fracture     Thyroid disease        Past Surgical History  Past Surgical History:   Procedure Laterality Date    BLOCK, SACROILIAC JOINT Bilateral 5/23/2019    [facility] by [facility]    Block-nerve-medial branch-lumbar- L2-3-4-5 Bilateral 8/9/2018    [facility]    Block-nerve-medial branch-lumbar-L2-3-4-5 Bilateral 8/16/2018    [facility] by [facility]    GASTRIC BYPASS  2003    RADIOFREQUENCY ABLATION- L2-3-4-5 Left 8/30/2018    [facility]    RADIOFREQUENCY ABLATION-L2-3-4-5 Right 9/6/2018    [facility] by [facility]ati [facility]       Family History  Family History   Problem Relation Age of Onset    Atrial fibrillation Mother        Social History  Social History     Socioeconomic History    Marital status: " Single     Spouse name: Not on file    Number of children: Not on file    Years of education: Not on file    Highest education level: Not on file   Occupational History    Not on file   Social Needs    Financial resource strain: Not on file    Food insecurity:     Worry: Not on file     Inability: Not on file    Transportation needs:     Medical: Not on file     Non-medical: Not on file   Tobacco Use    Smoking status: Never Smoker    Smokeless tobacco: Never Used   Substance and Sexual Activity    Alcohol use: No    Drug use: No    Sexual activity: Not on file   Lifestyle    Physical activity:     Days per week: Not on file     Minutes per session: Not on file    Stress: Not on file   Relationships    Social connections:     Talks on phone: Not on file     Gets together: Not on file     Attends Shinto service: Not on file     Active member of club or organization: Not on file     Attends meetings of clubs or organizations: Not on file     Relationship status: Not on file   Other Topics Concern    Not on file   Social History Narrative    Not on file        Review of Systems  14-point review of systems as follows:   No check adore indicates NEGATIVE response   Constitutional: [] weight loss, [] change to appetite   Eyes: [] change in vision, [] double vision   Ears, nose, mouth, throat: [] frequent nose bleeds, [] ringing in the ears   Respiratory: [x] cough, [] wheezing   Cardiovascular: [] chest pain, [] palpitations   Gastrointestinal: [] jaundice, [] nausea/vomiting   Genitourinary: [] incontinence, [] burning with urination   Hematologic/lymphatic: [x] easy bruising/bleeding, [] night sweats   Neurological: [x] numbness, [x] weakness   Endocrine: [x] fatigue, [x] heat/cold intolerance   Allergy/Immunologic: [] fevers, [] chills   Musculoskeletal: [x] muscle pain, [x] joint pain   Psychiatric: [] thoughts of harming self/others, [] depression   Integumentary: [] rashes, [] sores that do not  heal     Objective:        Vitals:    07/26/19 1621   BP: 102/73   Pulse: 78   Resp: 18     Body mass index is 22.69 kg/m².     7/26/19 -   Patient appears more kyphotic than usual   Exquisite tenderness over the low thoracic spine  Normal tone in the lower extremities  All lumbar myotomes 5/5  Patella and Achilles 2+ bilateral  No clonus  (patient has baseline upgoing toes)   Patient is ambulatory    5/8/19 -   LUMBAR SPINE:  INSPECTION: no lesions   ROM: normal flexion but limited extension and lateral rotation   MUSCLE SPASM: mild    PARASPINAL TENDERNESS: bilateral   FACET LOADING: bilateral   SI JOINT TENDERNESS:  Bilateral  JI: neg  STRAIGHT LEG RAISE: neg  CROSSED STRAIGHT LEG RAISE: neg  HIP INTERNAL ROM: normal  HIP EXTERNAL ROM: normal   GT TENDERNESS:  Bilateral  Strength intact in all lumbosacral dermatomes 5/5 bilateral  Reflexes 2+ bilateral  Toes upgoing (chronic)        Data Review:     I have personally reviewed the referring provider's notes, labs, & imaging made available to me today.     RADIOLOGY STUDIES:  I have personally reviewed the pertinent images performed.     6/25/19 [facility]       7/24/18 MRI thoracic spine without contrast  1.  Severe T9 compression fracture of indeterminate age which is at least partially subacute with minimal bone edema.  2.  Severe L1 compression fracture which appears chronic, with no associated bone marrow edema.  3.  Chronic T11 Schmorl's with no associated bone marrow edema.  4.  Mild multilevel degenerative disc disease  5.  There is no spinal or foraminal stenosis appreciated  6.  Cholelithiasis    07/24/2018 MRI lumbar spine without contrast  1.  Severe chronic L1 compression fracture with no bone marrow edema appreciated  2.  Mild degenerative disc disease and mild to moderate facet arthropathy predominantly lower spine    08/01/2018 [facility] HIDA scan  1.  No evidence of cystic or common duct obstruction  2.  Normal gallbladder function    6/8/15 MRI lumbar  spine w/o contrast:  L1-2: minimal broad based disc protrusion, moderate facet arthropathy  L2-3: mild broad based disc protrusion L > R, annular tear, moderate facet arthropathy, mild stenosis left lateral recess (with mild compression of left L3 root)  L3-4: mild broad based disc protrusion, mild/moderate facet arthropathy   L4-5: mild broad based disc protrusion, moderate/severe facet arthropathy, mild lateral recess stenosis (with mild compression of L5 nerve roots)  L5-S1: transitional vertebra. Mild broad based disc protrusion R > L. Moderate facet arthropathy. Mild/mod compression of right lateral recess with compression of right S1 root.  Chronic L1 fracture   Chronic healed T11 fracture     Results for orders placed or performed during the hospital encounter of 04/03/18   MRI Brain W WO Contrast    Narrative     MRI BRAIN WITH AND WITHOUT CONTRAST    CPT: 53362    Clinical data:  Neurological deficit.  Left hand paralysis.    Comparison: CT head 04/03/2018    Technique: Multiplanar, multisequence MRI of the brain was performed before and after administration of contrast.     Findings:     The diffusion weighted imaging is negative for acute or subacute ischemia.  Gray-white matter differentiation appears within normal limits.  No extra-axial fluid collection, hydrocephalus, mass effect, midline shift is identified.  There is no acute hemorrhage.  Visualized vascular flow voids appear intact.  Visualized orbits appear unremarkable.  Visualized paranasal sinuses and mastoid air cells appear clear.  No mass is identified.  No pathologic enhancement is noted.  There is demonstrated a shallow sella turcica with minimal pituitary tissue identified.    Impression       1. No acute intracranial abnormality.  2. No pathologic enhancement.      Electronically signed by: NITHIN HOYT MD  Date:     04/03/18  Time:    19:40    CT Head Without Contrast    Narrative    CT scan of the head without contrast    Clinical  history: Weakness in the fingers of the left hand    [id number]    Findings: Multiple computerized images of the head were obtained in axial projection using thin slices.  No intravenous contrast was administered.  The study was viewed at soft tissue and bone window settings.  Reconstructions were done in coronal and sagittal planes.    No focal lesions are identified.  There is no evidence of ventricular dilatation, midline shift, or hemorrhage.  No territorial infarct was visualized.  The gray-white junction is maintained throughout.  No extracerebral fluid collection was demonstrated.  The bony calvarium appears intact.  No sinus or mastoid abnormality was noted.  The sella is shallow and very small which is of uncertain significance.  This might be better evaluated with an MRI scan.    Impression     There are no acute findings.  Note is made of the sella being shallow and somewhat small which is of uncertain significance.  An MRI scan may be helpful in further evaluation of this area.      Electronically signed by: RUTHIE MO MD  Date:     04/03/18  Time:    16:23        Lab Results   Component Value Date     (L) 04/24/2019     (L) 04/24/2019    K 3.9 04/24/2019    K 3.9 04/24/2019    MG 1.7 04/03/2018     04/24/2019     04/24/2019    CO2 26 04/24/2019    CO2 26 04/24/2019    BUN 9 04/24/2019    BUN 9 04/24/2019    CREATININE 0.9 04/24/2019    CREATININE 0.9 04/24/2019    GLU 71 04/24/2019    GLU 71 04/24/2019    HGBA1C 4.9 04/04/2018    AST 41 (H) 04/24/2019    AST 41 (H) 04/24/2019    ALT 12 04/24/2019    ALT 12 04/24/2019    ALBUMIN 3.7 04/24/2019    ALBUMIN 3.7 04/24/2019    PROT 7.1 04/24/2019    PROT 7.1 04/24/2019    BILITOT 0.3 04/24/2019    BILITOT 0.3 04/24/2019       Lab Results   Component Value Date    WBC 5.49 06/17/2018    HGB 11.1 (L) 06/17/2018    HCT 33.1 (L) 06/17/2018    MCV 85 06/17/2018     06/17/2018       Lab Results   Component Value Date    TSH 0.575  04/24/2019       Assessment & Plan:       Problem List Items Addressed This Visit        Neuro    Compression fracture of body of thoracic vertebra - Primary    Overview     Chronic L1  T9 sustained after a fall in April 2018  New T11 sustained after a fall in late June 2019 with dramatically worsening back pain; new radicular pain in the left thigh; worsening kyphosis evident on exam and on x-ray    The fracture is probably causing a mild upper lumbar radiculopathy given the new left thigh pain  Radiculopathy was not seen on EMG shortly prior to her new compression fracture  Overall trying to minimize steroids given her chronic oral steroid use, and multiple steroid induced symptoms (osteoporosis, myopathy, probable small fiber neuropathy)    Order repeat thoracic imaging  Refer to Dr. Whitaker for consideration of T9 kyphoplasty given the above         Relevant Orders    Ambulatory referral to Neurosurgery       Psychiatric    Chronically on opiate therapy    Overview     Patient would like me to take over her chronic pain management. Reviewed her previous pain physician's notes which show good relief and no signs of adverse behavior. She has been on the same regimen for chronic low back pain for years. Her UDS was consistent with prescribed substances. Opiod risk tool shows she is low risk with score 1. Her MME is low at 60 MME daily.     chronic opiate contract on file  Continue MS Contin 15 yolanda BID   Continue Percocet 7.5mg/325mg QID prn             Endocrine    Panhypopituitarism    Overview     Since birth, on chronic hydrocortisone and thyroid replacement            Orthopedic    Myopathy    Overview     Mild proximal myopathy bilateral lower extremity seen on EMG 06/13/2019  The EMG was due originally ordered for evaluation of radiculopathy which was not seen            Other    Numbness and tingling of both legs below knees    Overview     Bilateral L5 distribution tingling though not reproduced on  exam  Nerve conduction on 06/13/2019 did not show any neuropathy or radiculopathy  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     Kelly Prajapati MD

## 2019-07-26 NOTE — PATIENT INSTRUCTIONS
TESTING:  -- none     REFERRAL:  -- let's have you see Dr Cooper for your spinal compression fractures especially the new T11 one     PREVENTION OF PAIN:   -- continue duloxetine 20mg daily - take in the MORNING to help with insomnia   -- start gabapentin 100mg at night to help with new left leg nerve pain     AS-NEEDED TREATMENT OF PAIN:  -- continue MS Contin 15mg twice per day   -- continue Percocet 7.5mg/325mg 4x per day #120   -- continue Flexeril 10mg twice a day

## 2019-07-29 ENCOUNTER — PATIENT MESSAGE (OUTPATIENT)
Dept: NEUROLOGY | Facility: CLINIC | Age: 44
End: 2019-07-29

## 2019-07-29 ENCOUNTER — TELEPHONE (OUTPATIENT)
Dept: NEUROLOGY | Facility: CLINIC | Age: 44
End: 2019-07-29

## 2019-07-29 NOTE — TELEPHONE ENCOUNTER
----- Message from Kelly Prajapati MD sent at 7/26/2019  5:54 PM CDT -----  I ordered an MRI thoracic spine after she left, please call her to schedule

## 2019-07-30 ENCOUNTER — PATIENT MESSAGE (OUTPATIENT)
Dept: NEUROLOGY | Facility: CLINIC | Age: 44
End: 2019-07-30

## 2019-07-30 RX ORDER — CYCLOBENZAPRINE HCL 10 MG
TABLET ORAL
Qty: 90 TABLET | Refills: 11 | Status: ON HOLD | OUTPATIENT
Start: 2019-07-30 | End: 2019-10-16 | Stop reason: HOSPADM

## 2019-08-01 ENCOUNTER — PATIENT MESSAGE (OUTPATIENT)
Dept: NEUROLOGY | Facility: CLINIC | Age: 44
End: 2019-08-01

## 2019-08-06 ENCOUNTER — HOSPITAL ENCOUNTER (OUTPATIENT)
Dept: RADIOLOGY | Facility: HOSPITAL | Age: 44
Discharge: HOME | End: 2019-08-06
Attending: PSYCHIATRY & NEUROLOGY
Payer: MEDICARE

## 2019-08-06 DIAGNOSIS — S22.000A COMPRESSION FRACTURE OF BODY OF THORACIC VERTEBRA: ICD-10-CM

## 2019-08-06 PROCEDURE — 72146 MRI THORACIC SPINE WITHOUT CONTRAST: ICD-10-PCS | Mod: 26,,, | Performed by: RADIOLOGY

## 2019-08-06 PROCEDURE — 72146 MRI CHEST SPINE W/O DYE: CPT | Mod: 26,,, | Performed by: RADIOLOGY

## 2019-08-06 PROCEDURE — 72146 MRI CHEST SPINE W/O DYE: CPT | Mod: TC,PO

## 2019-08-07 ENCOUNTER — TELEPHONE (OUTPATIENT)
Dept: PAIN MEDICINE | Facility: HOSPITAL | Age: 44
End: 2019-08-07

## 2019-08-07 ENCOUNTER — PATIENT MESSAGE (OUTPATIENT)
Dept: NEUROLOGY | Facility: CLINIC | Age: 44
End: 2019-08-07

## 2019-08-07 NOTE — TELEPHONE ENCOUNTER
Juan Whitney,  Absolutely, I'm going to have her come in for an evaluation. Thanks    Staff: please set up for eval on Thursday or Friday please

## 2019-08-07 NOTE — TELEPHONE ENCOUNTER
----- Message from Evelyne Bella MD sent at 8/7/2019  3:20 PM CDT -----  Juan Cheney, I am covering for Kelly. I reveiwed her thoracic CT from yesterday which reveals an interval vertebral  fracture. Could you evaluate for vertebroplasty? Thanks  Nataly  ----- Message -----  From: Li, Rad Results In  Sent: 8/6/2019   5:28 PM  To: Kelly Prajapati MD

## 2019-08-09 ENCOUNTER — OFFICE VISIT (OUTPATIENT)
Dept: PAIN MEDICINE | Facility: CLINIC | Age: 44
End: 2019-08-09
Payer: MEDICARE

## 2019-08-09 VITALS
BODY MASS INDEX: 25.14 KG/M2 | SYSTOLIC BLOOD PRESSURE: 90 MMHG | HEIGHT: 59 IN | WEIGHT: 124.69 LBS | DIASTOLIC BLOOD PRESSURE: 54 MMHG | TEMPERATURE: 97 F | HEART RATE: 90 BPM | RESPIRATION RATE: 20 BRPM | OXYGEN SATURATION: 98 %

## 2019-08-09 DIAGNOSIS — M47.816 LUMBAR FACET ARTHROPATHY: ICD-10-CM

## 2019-08-09 DIAGNOSIS — S22.000A CLOSED COMPRESSION FRACTURE OF THORACIC VERTEBRA, INITIAL ENCOUNTER: Primary | ICD-10-CM

## 2019-08-09 DIAGNOSIS — M47.816 SPONDYLOSIS OF LUMBAR SPINE: ICD-10-CM

## 2019-08-09 DIAGNOSIS — M48.061 SPINAL STENOSIS OF LUMBAR REGION WITHOUT NEUROGENIC CLAUDICATION: ICD-10-CM

## 2019-08-09 DIAGNOSIS — M54.16 LEFT LUMBAR RADICULOPATHY: ICD-10-CM

## 2019-08-09 PROCEDURE — 3008F BODY MASS INDEX DOCD: CPT | Mod: CPTII,S$GLB,, | Performed by: ANESTHESIOLOGY

## 2019-08-09 PROCEDURE — 99999 PR PBB SHADOW E&M-EST. PATIENT-LVL IV: ICD-10-PCS | Mod: PBBFAC,,, | Performed by: ANESTHESIOLOGY

## 2019-08-09 PROCEDURE — 99204 PR OFFICE/OUTPT VISIT, NEW, LEVL IV, 45-59 MIN: ICD-10-PCS | Mod: S$GLB,,, | Performed by: ANESTHESIOLOGY

## 2019-08-09 PROCEDURE — 99999 PR PBB SHADOW E&M-EST. PATIENT-LVL IV: CPT | Mod: PBBFAC,,, | Performed by: ANESTHESIOLOGY

## 2019-08-09 PROCEDURE — 99204 OFFICE O/P NEW MOD 45 MIN: CPT | Mod: S$GLB,,, | Performed by: ANESTHESIOLOGY

## 2019-08-09 PROCEDURE — 3008F PR BODY MASS INDEX (BMI) DOCUMENTED: ICD-10-PCS | Mod: CPTII,S$GLB,, | Performed by: ANESTHESIOLOGY

## 2019-08-09 NOTE — PROGRESS NOTES
"This note was completed with dictation software and grammatical errors may exist.    CC:  Back pain, hip pain, left leg pain    HPI:  The patient is a 43-year-old woman with a history of panhypopituitarism, subsequent adrenal insufficiency on chronic steroids with resulting osteoporosis who presents in referral from Dr. Prajapati for evaluation of a T11 compression fracture.  The patient has a history of previous T9 and at L1 compression fractures with development of kyphosis.  She has chronic back pain going back as far as about 13 years ago and has been treated with opioid medication from Dr. Joseph.  She recently started seeing Dr. Prajapati because she lives on the [place].  She has undergone bilateral L2 through L5 radiofrequency ablation procedures with some relief of her low back pain. However, she had fallen in the beginning of June, 2019 and began having worsened back pain at that point.  This happened to be several weeks after an MRI of the thoracic and lumbar spine was taken which had not shown any new compression fractures.  However, after the fall, an x-ray was performed that showed a new T11 compression fracture.  An MRI was then obtained on 08/06/2019 which I have reviewed as noted below, does show a compression fracture of the vertebral body of T11 but no remaining marrow edema in the body but does show edema in the spinous process. The patient is main complaint today is left hip pain, pain radiating in the tailbone to the bilateral hips and legs with numbness and tingling in her left posterior thigh.  She reports that she occasionally has pain in the right leg but this is minimal.  She states that probably her worst pain is located in the tailbone.  She does complain of pain when riding in the car and going over bumps this being located in the mid back.    From Dr. Prajapati's notes: "Her mother expresses concern as to whether anything else can be done to minimize her medications. She reports she " "cannot get ESIs due to her chronic steroid use."    Pain intervention history:She is status post lumbar medial branch nerve RFA at L2-3-4-5 on 8/30/18 (left) then 9/6/18 (right), she reports 50% relief from this.    ROS:  She reports rhinorrhea, hoarse voice, easy bruising, back pain.  Balance of review of systems is negative.      Past Medical History:   Diagnosis Date    Adrenal insufficiency     Arthritis     "Bulging and herniated lumbar discs"     Chronic instability of knee     Encounter for blood transfusion     Humerus fracture     Panhypopituitarism     Pelvic fracture     Rib fracture     Thyroid disease        Past Surgical History:   Procedure Laterality Date    BLOCK, SACROILIAC JOINT Bilateral 5/23/2019    [facility]    Block-nerve-medial branch-lumbar- L2-3-4-5 Bilateral 8/9/2018    [facility]    Block-nerve-medial branch-lumbar-L2-3-4-5 Bilateral 8/16/2018    [facility]    GASTRIC BYPASS  2003    RADIOFREQUENCY ABLATION- L2-3-4-5 Left 8/30/2018    [facility]    RADIOFREQUENCY ABLATION-L2-3-4-5 Right 9/6/2018    [facility]       Social History     Socioeconomic History    Marital status: Single     Spouse name: Not on file    Number of children: Not on file    Years of education: Not on file    Highest education level: Not on file   Occupational History    Not on file   Social Needs    Financial resource strain: Not on file    Food insecurity:     Worry: Not on file     Inability: Not on file    Transportation needs:     Medical: Not on file     Non-medical: Not on file   Tobacco Use    Smoking status: Never Smoker    Smokeless tobacco: Never Used   Substance and Sexual Activity    Alcohol use: No    Drug use: No    Sexual activity: Not on file   Lifestyle    Physical activity:     Days per week: Not on file     Minutes per " "session: Not on file    Stress: Not on file   Relationships    Social connections:     Talks on phone: Not on file     Gets together: Not on file     Attends Islam service: Not on file     Active member of club or organization: Not on file     Attends meetings of clubs or organizations: Not on file     Relationship status: Not on file   Other Topics Concern    Not on file   Social History Narrative    Not on file         Medications/Allergies: See med card    Vitals:    08/09/19 0852   BP: (!) 90/54   Pulse: 90   Resp: 20   Temp: 97.3 °F (36.3 °C)   TempSrc: Oral   SpO2: 98%   Weight: 56.5 kg (124 lb 10.7 oz)   Height: 4' 11" (1.499 m)   PainSc:   6   PainLoc: Back         Physical exam:  Gen: A and O x3, pleasant, well-groomed, cachectic  Skin: No rashes or obvious lesions  HEENT: PERRLA, no obvious deformities on ears or in canals. Trachea midline.  CVS: Regular rate and rhythm, normal palpable pulses.  Resp:No increased work of breathing, symmetrical chest rise.  Abdomen: Soft, NT/ND.  Musculoskeletal:  Has a severely kyphotic posture, walks with a slow cautious gait.  Neuro:  Lower extremities: 5/5 strength bilaterally  Reflexes: Patellar 1+, Achilles 1+ bilaterally.  Sensory:  Intact and symmetrical to light touch and pinprick in L2-S1 dermatomes bilaterally.    Lumbar spine:  Lumbar spine:  Range of motion is mildly reduced with forward flexion with mild increased pain in the lumbosacral junction, moderately reduced with extension with increased pain in the lumbosacral junction but also closer to the thoracolumbar junction.    Catalino's test causes no increased pain on either side.    Supine straight leg raise is negative bilaterally.    Internal and external rotation of the hip causes no increased pain on either side.  Myofascial exam:  There is tenderness palpation throughout the lumbar paraspinous musculature, there is tenderness to palpation over the upper lumbar and lower thoracic spinous " processes.    Imagin19 MRI T-spine:  1. Severe anterior compression deformity of the T11 vertebral body with approximately 70% vertebral body height loss, new since 2019.  No significant retropulsion or vertebral body marrow edema.  2. Prominent marrow edema in the T11 spinous process, possibly related to a traumatic bone contusion or nondisplaced fracture.  3. Stable marked chronic anterior compression deformities of the T9 and L1 vertebral bodies.  No spinal canal stenosis or cord compression at any level.    19 MRI L-spine:  T12-L1: There is a mild disc bulge which follows the mild retropulsion.  There is no spinal canal or foraminal stenosis.  L1-2: There is minimal bulging of the annulus and mild facet joint arthropathy.  There is no spinal canal or significant foraminal stenosis.  L2-3: There is a mild to moderate disc bulge with dorsal annular fissure.  Also, there is facet joint arthropathy with ligamentum flavum thickening.  There are small bilateral facet joint effusions.  There is borderline to mild spinal stenosis and mild bilateral foraminal stenosis.  L3-4: There is mild facet joint arthropathy and minimal bulging of the annulus with osteophytic ridging.  There is no spinal stenosis.  There is mild bilateral foraminal stenosis.  L4-5: There is mild-to-moderate facet joint arthropathy.  There is a diffuse disc bulge with osteophytic ridging.  There is minimal flattening of the ventral dural sac.  There is no spinal stenosis.  There is mild-to-moderate bilateral foraminal stenosis.  L5-S1: There is disc space narrowing, worse towards the left.  There is a mild disc bulge and there is mild facet joint arthropathy.  There is no significant spinal stenosis.  Also, there is no significant foraminal stenosis.      Assessment:  The patient is a 43-year-old woman with a history of panhypopituitarism, subsequent adrenal insufficiency on chronic steroids with resulting osteoporosis who presents  in referral from Dr. Prajapati for evaluation of a T11 compression fracture.    1. Closed compression fracture of thoracic vertebra, initial encounter  HME - OTHER   2. Spondylosis of lumbar spine  HME - OTHER   3. Lumbar facet arthropathy     4. Spinal stenosis of lumbar region without neurogenic claudication     5. Left lumbar radiculopathy         Plan:  1.  We discussed that she definitely had a compression fracture at T11 in the vertebral body but the MRI shows no remaining edema in the actual body itself so I do not think that her remaining pain is actually coming from the fracture of the body but instead the spinous process still shows edema and likely is the source of some of her lower thoracic back pain. Thus, I do not think that a kyphoplasty would necessarily improve her pain because this is meant to treat vertebral body pain, and since it has been two months since the fall, this likely healed on its own.  There would be considerable risk of creating a very stiff T11 vertebral body which would place the adjacent T10 and T12 vertebral bodies at risk for subsequent fractures which can often happen soon after the kyphoplasty treatment.  The remaining spinous process pain cannot be treated by kyphoplasty but instead I have provided her with a lumbar support brace that extends up into the thoracic region which will help stabilize and provide comfort while this continues to heal over the next month or two.  2.  Interestingly, her worst pain is actually located in the left leg and in the tailbone.  This sounds more like radicular symptoms and she does have some mild stenosis at L2/3 and foraminal stenosis at L4/5 which may contribute to the buttock and left leg pain. She is not a great candidate for epidural steroid injections because of her chronic steroid consumption but this may need to be considered and discussed with her endocrinologist.  For any type of decompression if foraminal stenosis or canal stenosis or  truly the source of pain, I do not think surgery would be a good option due to her severe osteoporosis.  In that case, spinal cord stimulation may be needed to treat back and radicular pain as an option.    Thank you for referring this interesting patient, and I look forward to continuing to collaborate in her care.

## 2019-08-09 NOTE — LETTER
August 10, 2019      Kelly Prajapati MD  [address] [facility] [address]  [address]  [facility] [address]           [facility]  [address] [facility] [address] [place] [address]  Phone: [phone]  Fax: [phone]          Patient: Radha Ramirez   MR Number: [id number]   YOB: 1975   Date of Visit: 8/9/2019       Dear Dr. Kelly Prajapati:    Thank you for referring Radha Ramirez to me for evaluation. Attached you will find relevant portions of my assessment and plan of care.    If you have questions, please do not hesitate to call me. I look forward to following Radha Ramirez along with you.    Sincerely,    Shravan Whitaker MD    Enclosure  CC:  No Recipients    If you would like to receive this communication electronically, please contact [email or web address] or [phone] to request more information on [employer] access.    For providers and/or their staff who would like to refer a patient to [facility], please contact us through our one-stop-shop provider referral line, [facility], at [phone].    If you feel you have received this communication in error or would no longer like to receive these types of communications, please e-mail [email or web address]

## 2019-08-15 ENCOUNTER — PATIENT MESSAGE (OUTPATIENT)
Dept: NEUROLOGY | Facility: CLINIC | Age: 44
End: 2019-08-15

## 2019-08-15 DIAGNOSIS — M54.50 CHRONIC BILATERAL LOW BACK PAIN WITHOUT SCIATICA: ICD-10-CM

## 2019-08-15 DIAGNOSIS — Z79.891 CHRONICALLY ON OPIATE THERAPY: ICD-10-CM

## 2019-08-15 DIAGNOSIS — G89.29 CHRONIC BILATERAL LOW BACK PAIN WITHOUT SCIATICA: ICD-10-CM

## 2019-08-15 RX ORDER — MORPHINE SULFATE 15 MG/1
15 TABLET, FILM COATED, EXTENDED RELEASE ORAL 2 TIMES DAILY
Qty: 60 TABLET | Refills: 0 | Status: SHIPPED | OUTPATIENT
Start: 2019-08-18 | End: 2019-09-16 | Stop reason: SDUPTHER

## 2019-08-15 RX ORDER — OXYCODONE AND ACETAMINOPHEN 7.5; 325 MG/1; MG/1
1 TABLET ORAL EVERY 6 HOURS PRN
Qty: 120 TABLET | Refills: 0 | Status: SHIPPED | OUTPATIENT
Start: 2019-08-18 | End: 2019-09-16 | Stop reason: SDUPTHER

## 2019-08-21 ENCOUNTER — PATIENT MESSAGE (OUTPATIENT)
Dept: NEUROLOGY | Facility: CLINIC | Age: 44
End: 2019-08-21

## 2019-08-21 ENCOUNTER — TELEPHONE (OUTPATIENT)
Dept: NEUROLOGY | Facility: CLINIC | Age: 44
End: 2019-08-21

## 2019-08-21 DIAGNOSIS — R20.0 NUMBNESS AND TINGLING OF BOTH LEGS BELOW KNEES: Primary | ICD-10-CM

## 2019-08-21 DIAGNOSIS — R20.2 NUMBNESS AND TINGLING OF BOTH LEGS BELOW KNEES: Primary | ICD-10-CM

## 2019-08-21 NOTE — TELEPHONE ENCOUNTER
----- Message from Richa Hickman sent at 8/21/2019  1:46 PM CDT -----  Type: Needs Medical Advice    Who Called:  Patient  Symptoms (please be specific):  Right foot went numb since 6:00 am, Left side of head is also numb but on and off for at least a month.  Pharmacy name and phone #:    [id number] - [place], [place] - [address] [address]  Phone: [phone] Fax: [phone]    Best Call Back Number: [phone]  Please call to advise if she should come in for an appointment or not.

## 2019-08-21 NOTE — TELEPHONE ENCOUNTER
Please schedule repeat nerve conduction study with Dr. Gupta in late September or early October for follow-up

## 2019-08-22 NOTE — TELEPHONE ENCOUNTER
Spoke with pt and scheduled EMG with Dr. Gupta; date/time/location confirmed; pt added to waiting list; verbalized understanding.

## 2019-09-03 ENCOUNTER — PATIENT MESSAGE (OUTPATIENT)
Dept: NEUROLOGY | Facility: CLINIC | Age: 44
End: 2019-09-03

## 2019-09-16 ENCOUNTER — PATIENT MESSAGE (OUTPATIENT)
Dept: NEUROLOGY | Facility: CLINIC | Age: 44
End: 2019-09-16

## 2019-09-16 DIAGNOSIS — Z79.891 CHRONICALLY ON OPIATE THERAPY: ICD-10-CM

## 2019-09-16 DIAGNOSIS — G89.29 CHRONIC BILATERAL LOW BACK PAIN WITHOUT SCIATICA: ICD-10-CM

## 2019-09-16 DIAGNOSIS — M54.50 CHRONIC BILATERAL LOW BACK PAIN WITHOUT SCIATICA: ICD-10-CM

## 2019-09-16 RX ORDER — OXYCODONE AND ACETAMINOPHEN 7.5; 325 MG/1; MG/1
1 TABLET ORAL EVERY 4 HOURS PRN
Qty: 150 TABLET | Refills: 0 | Status: ON HOLD | OUTPATIENT
Start: 2019-09-16 | End: 2019-10-15 | Stop reason: SDUPTHER

## 2019-09-16 RX ORDER — MORPHINE SULFATE 15 MG/1
15 TABLET, FILM COATED, EXTENDED RELEASE ORAL 2 TIMES DAILY
Qty: 60 TABLET | Refills: 0 | Status: SHIPPED | OUTPATIENT
Start: 2019-09-17 | End: 2019-10-18 | Stop reason: SDUPTHER

## 2019-09-16 NOTE — TELEPHONE ENCOUNTER
This month, I have sent in #150 of percocet 7.5/325 to account for dental work patient is having. Next month go to usual #120.

## 2019-09-24 ENCOUNTER — OFFICE VISIT (OUTPATIENT)
Dept: NEUROLOGY | Facility: CLINIC | Age: 44
End: 2019-09-24
Payer: MEDICARE

## 2019-09-24 VITALS
BODY MASS INDEX: 23.98 KG/M2 | HEIGHT: 59 IN | RESPIRATION RATE: 18 BRPM | HEART RATE: 96 BPM | WEIGHT: 118.94 LBS | DIASTOLIC BLOOD PRESSURE: 74 MMHG | SYSTOLIC BLOOD PRESSURE: 107 MMHG

## 2019-09-24 DIAGNOSIS — M54.50 CHRONIC BILATERAL LOW BACK PAIN WITHOUT SCIATICA: ICD-10-CM

## 2019-09-24 DIAGNOSIS — Z79.891 CHRONICALLY ON OPIATE THERAPY: ICD-10-CM

## 2019-09-24 DIAGNOSIS — G72.9 MYOPATHY: ICD-10-CM

## 2019-09-24 DIAGNOSIS — E23.0 PANHYPOPITUITARISM: ICD-10-CM

## 2019-09-24 DIAGNOSIS — S22.000A COMPRESSION FRACTURE OF BODY OF THORACIC VERTEBRA: ICD-10-CM

## 2019-09-24 DIAGNOSIS — G89.29 CHRONIC BILATERAL LOW BACK PAIN WITHOUT SCIATICA: ICD-10-CM

## 2019-09-24 DIAGNOSIS — R20.9 DISTURBANCE OF SKIN SENSATION: Primary | ICD-10-CM

## 2019-09-24 PROCEDURE — 99214 PR OFFICE/OUTPT VISIT, EST, LEVL IV, 30-39 MIN: ICD-10-PCS | Mod: S$GLB,,, | Performed by: PSYCHIATRY & NEUROLOGY

## 2019-09-24 PROCEDURE — 99999 PR PBB SHADOW E&M-EST. PATIENT-LVL IV: ICD-10-PCS | Mod: PBBFAC,,, | Performed by: PSYCHIATRY & NEUROLOGY

## 2019-09-24 PROCEDURE — 3008F PR BODY MASS INDEX (BMI) DOCUMENTED: ICD-10-PCS | Mod: CPTII,S$GLB,, | Performed by: PSYCHIATRY & NEUROLOGY

## 2019-09-24 PROCEDURE — 3008F BODY MASS INDEX DOCD: CPT | Mod: CPTII,S$GLB,, | Performed by: PSYCHIATRY & NEUROLOGY

## 2019-09-24 PROCEDURE — 99999 PR PBB SHADOW E&M-EST. PATIENT-LVL IV: CPT | Mod: PBBFAC,,, | Performed by: PSYCHIATRY & NEUROLOGY

## 2019-09-24 PROCEDURE — 99214 OFFICE O/P EST MOD 30 MIN: CPT | Mod: S$GLB,,, | Performed by: PSYCHIATRY & NEUROLOGY

## 2019-09-24 RX ORDER — DULOXETIN HYDROCHLORIDE 20 MG/1
40 CAPSULE, DELAYED RELEASE ORAL DAILY
Qty: 60 CAPSULE | Refills: 11 | Status: SHIPPED | OUTPATIENT
Start: 2019-09-24

## 2019-09-24 NOTE — PATIENT INSTRUCTIONS
TESTING:  -- schedule cervical xray to evaluate for cause of new left occipital neuralgia   -- return for skin biopsy to test for small fiber neuropathy as a cause of your feet tingling     REFERRAL:  -- none    PREVENTION OF PAIN:   -- continue duloxetine 20mg daily - take in the MORNING to help with insomnia   -- continue gabapentin 300mg at night to help with nerve pain in the feet     AS-NEEDED TREATMENT OF PAIN:  -- continue MS Contin 15mg twice per day   -- continue Percocet 7.5mg/325mg 4x per day #150 this month accounting for dental work, #120 next month   -- continue Flexeril 10mg twice a day

## 2019-09-24 NOTE — PROGRESS NOTES
"Date of service: 9/24/2019  Referring provider: No ref. provider found    Subjective:      Chief complaint: Back Pain       Patient ID: Radha Ramirez is a 44 y.o. 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  Dr. Whitaker gave her a brace.  We started low-dose gabapentin at night to help with new left leg nerve pain.    For this month by temporarily increased her Percocet from 120 tablets per month to 150 tablets to account for the extensive dental work that she will have this week.    Today she reports she is about the same.  She feels a tingly numbness in both feet much worse on the right, it affects the entire foot. The legs feel "numb" without pain. Also has numbness on the left side of her head.  Current pain score is 5 with a range of 5-10.    She was able to raise the gabapentin to 300mg qHS with improvement and tolerance.    The brace is helping the thoracic pain.       INTERVAL HISTORY - 7/26/19     In the interim she had her EMG nerve conduction with Dr. Gupta which showed a mild proximal myopathy in the lower extremities, presumably from her chronic steroid therapy.  There was no neuropathy and there is no radiculopathy.    Today she reports she is worse.  She fell approximately 1 month ago (late June) and the pain in her lower back got worse., especially on the left side.  Her primary ordered x-rays and found that she had a new compression fracture at T11.    Pain is in the low back, off to the left, radiating down the anterior left thigh down to the knee.  Her current pain score is 8 with range of 7-10.    I prescribed her gabapentin 300 mg q.h.s. in the interim when she notified me " of this new pain but she could not take this dose at all, it made her extremely sedated in conjunction with her chronic opiate therapy and Flexeril.  Her stable dose of chronic opiate therapy has not helped this new pain, mom gave her a Tylenol No.  3 which took the edge off and on 2 occasions patient doubled up on her Percocet with better relief.     There is no numbness or tingling.    She does note that her posture has worsened since.    INTERVAL HISTORY - 5/8/19     At last visit three months ago she was stable and we made no changes to her regimen.  Today she reports she is a little worse.  She feels that the lumbar facet mediated pain is returning and she does not think her pain medications are working as well as previously (percocet lasting only 1-2 hours).  Her pain is located in the middle and low back. Her current pain score seven with a range of 4-9.  She remains on duloxetine.  She remains on Percocet 7.5 mg q.i.d. and morphine XR 15 mg nightly.    She reports she noted a bruise on her back last week. She does not know how it got there, she does not recall falling or hitting anything. The pain that is present is in the sacrum radiating into the buttocks / hips. There is tingling pain in the thighs and numbness in the feet (dorsum) right much worse than left. No bowel bladder changes.       INTERVAL HISTORY - 2/28/19    Today she reports she is a little bit better.  The lumbar facet mediated pain continues to be fine.  She continues to experience bone pain in the middle of her back to the compression fracture.  She has not consulted with Neurosurgery due to chickening out.  Her chronic opiate therapy continues to be effective.  She has intermittent constipation that she is managing with MiraLax.  Duloxetine 20 mg is effective for pain. She takes it at night.  She does noted makes her a little bit jittery.  She has been having insomnia related to health issues with her mom.  She initially thought this  was Percocet interfering with her sleep and she taper down but her sleep did not improve. Her current pain score is four with a range 4 to 9.  Sometimes the left hand feels weak with grasp.    INTERVAL HISTORY - 9/21/18    She is status post lumbar medial branch nerve RFA at L2-3-4-5 on 8/30/18 (left) then 9/6/18 (right). In the interim she had developed some myoclonic twitching in the lower more than upper extremities which I suspected was probably from duloxetine so I asked her to stop and then resume at lower dose.     She reports that since the RFA her back pain has lessened but now she feels her bone pain more. She rates her relief as 50%. The pain continues to be in the lower back. Her pain score is 5 with a range of 4-10.    The twitching did lessen on the Cymbalta 20 mg.    She will see Dr. Cooper on 10/16/18 2 discussed the vertebroplasty or kyphoplasty    INTERVAL HISTORY - 8/17/18     She is status post bilateral lumbar medial branch block at L2-3-4-5 on 08/09/2018 and 08/16/2018 with 50% pain relief for approximately 12 hours after both blocks. During that time she was able to stand up and cook herself a meal which she is normally unable to do. She was less sedentary and able to stand more than normal and was able to grocery shop.  She would like to proceed with RFA of these nerves.    In the interim I received imaging records from [facility] specifically her MRI thoracic spine without contrast which shows that she has a severe T9 compression fracture of indeterminate age but we do know it is new since 04/13/2018 and was already seen on x-ray on 06/16/2018.  She does have mid back pain but the most severe of her pains in the lower spine.  She also has the chronic severe L1 compression fracture.    She continues on her chronic opiate regimen with good relief no adverse effects and no Advair behaviors.  If her current pain score 5 with a range of 3-10.    INTERVAL HISTORY - 7/20/18    UDS  done last visit 6/18/18 concordant with prescribed morphine, oxycodone and fentanyl given in ED day before    Today she reports she is about the same. Pain is in the middle and lower back, currently 6/10, range 4 to 10.     She reports she had new Xrays done showing new compression fractures with her PCP. She is pending an MRI. She will also be getting a bone density scan.    Current back pain is 6/10 with range of 4 to 10.     INTERVAL HISTORY - 6/18/18     The below problem (posterior interosseus neuropathy) has completely resolved and did so really a week later after onset. She has had no new weakness occur.     She currently follows for chronic pain management with neurologist Dr. Hunter Gayle in [facility] but requests to change to me to have closer to home provider. Her main pain is axial back pain with radiation down the legs to the level of the knees. Her current pain score is 8. She last filled her medications on 5/26. Her stable regimen is listed below. Her mother expresses concern as to whether anything else can be done to minimize her medications. She reports she cannot get ESIs due to her chronic steroid use. She reports she has never received lumbar facet blocks / RFAs.    Her pain is currently flared because she recently became dizzy in the setting of UTI, poor PO intake, and fell, on her sacrum. Was evaluated in the ED. XR showed chronic L1 fracture, age indeterminate but new since 4/3/18 T9 fracture, and possible fracture at the sacrococcygeal junction. She was discharged after having been given toradol and percocet in the ED.     ORIGINAL NEURO HISTORY - 4/5/18   Patient woke up yesterday 6am to use restroom, noticed left hand was weak. Went back to sleep woke up at 1030 am, still weak. Reports she cannot extend the fingers. Wrsit ok. NO PAIN. There is no numbness or tingling at all. She slept on her back, with the left wrist extended and under her chin, but was never on her humerus. No previous  episodes.      Incidentally found to have optic nerve thinning by eye doctor - undergoing visual fields for further diagnosis.      Takes MS contin, percocet, benadryl, flexeril before bed but all doses were typical. No alcohol before bed.     HOSPITAL COURSE:  4/5/18: no clinical changes. OT recommended outpatient therapy. MRI cervical done and normal. No pain.      #1 Left hand weakness - occurring after waking up after sleeping with left wrist extended supporting the chin. No numbness at all and no pain. Because this initially appeared to me to be involvement of 3 separate peripheral nerves (median, ulnar, radial) or C7-T1 roots and patient has bilateral babinski I recommend cervical spine imaging with contrast which was completely normal. 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 This single nerve explains all her motor weakness and as it is a pure motor nerve explains why she has no sensory loss. Her inflammatory markers are elevated but the sudden onset and complete lack of pain argue against a mononeuritis multilpex or an early parsonage reina as the diagnosis. Thus, I do think this is compressive, from a position she sustained while sleeping. I expect this to resolve or markedly improve within 3 months.     Current acute treatment:  -- MS Contin 15mg BID  -- Percocet 7.5mg/325mg QID     Current prevention:  -- duloxetine 20 mg - some jitteriness (40mg caused myoclonus)  -- gabapentin 100mg qHS     Previously tried:   -- none    Procedural history:   -- bilateral lumbar medial branch block at L2-3-4-5 on 08/09/2018  "and 08/16/2018 with 50% pain relief for approximately 12 hours after both blocks.  -- lumbar RFA at L2-3-4-5 on 8/30/18 (left) then 9/6/18 (right) - 50% relief of low back pain   -- 5/23/19 bilateral SI joint      Review of patient's allergies indicates:  No Known Allergies  Current Outpatient Medications   Medication Sig Dispense Refill    benzonatate (TESSALON PERLES ORAL) Take by mouth.      calcitRIOL (ROCALTROL) 0.5 MCG Cap Take 0.5 mcg by mouth every Mon, Wed, Fri.      cefdinir (OMNICEF) 300 MG capsule Take 300 mg by mouth 2 (two) times daily.      cyclobenzaprine (FLEXERIL) 10 MG tablet 10 mg 2-3 times per day as needed for muscle spasm 90 tablet 11    dicyclomine (BENTYL) 20 mg tablet Take 20 mg by mouth 4 (four) times daily as needed.      DULoxetine (CYMBALTA) 20 MG capsule Take 2 capsules (40 mg total) by mouth once daily. 60 capsule 11    ergocalciferol (ERGOCALCIFEROL) 50,000 unit Cap Take 1 capsule (50,000 Units total) by mouth every 7 days. 4 capsule 5    gabapentin (NEURONTIN) 100 MG capsule Take 1 capsule (100 mg total) by mouth every evening. 30 capsule 11    hydrocortisone (CORTEF) 20 MG Tab Take 20 mg by mouth once daily.       levothyroxine (SYNTHROID, LEVOTHROID) 175 MCG tablet Take 175 mcg by mouth once daily.      liothyronine (CYTOMEL) 5 MCG Tab Take 25 mcg by mouth once daily.      morphine (MS CONTIN) 15 MG 12 hr tablet Take 1 tablet (15 mg total) by mouth 2 (two) times daily. Medically necessary. 60 tablet 0    oxyCODONE-acetaminophen (PERCOCET) 7.5-325 mg per tablet Take 1 tablet by mouth every 4 (four) hours as needed for Pain. Medically necessary. 150 tablet 0    promethazine (PHENERGAN) 25 MG tablet Take 25 mg by mouth every 4 (four) hours.       No current facility-administered medications for this visit.        Past Medical History  Past Medical History:   Diagnosis Date    Adrenal insufficiency     Arthritis     "Bulging and herniated lumbar discs"     Chronic " instability of knee     Encounter for blood transfusion     Humerus fracture     Panhypopituitarism     Pelvic fracture     Rib fracture     Thyroid disease        Past Surgical History  Past Surgical History:   Procedure Laterality Date    GASTRIC BYPASS  2003    INJECTION OF ANESTHETIC AGENT AROUND MEDIAL BRANCH NERVES INNERVATING LUMBAR FACET JOINT Bilateral 8/9/2018    Procedure: Block-nerve-medial branch-lumbar- L2-3-4-5;  Surgeon: Kelly Prajapati MD;  Location: [facility] OR;  Service: Anesthesiology;  Laterality: Bilateral;    INJECTION OF ANESTHETIC AGENT AROUND MEDIAL BRANCH NERVES INNERVATING LUMBAR FACET JOINT Bilateral 8/16/2018    Procedure: Block-nerve-medial branch-lumbar-L2-3-4-5;  Surgeon: Kelly Prajapati MD;  Location: [facility] OR;  Service: Anesthesiology;  Laterality: Bilateral;    INJECTION OF ANESTHETIC AGENT INTO SACROILIAC JOINT Bilateral 5/23/2019    Procedure: BLOCK, SACROILIAC JOINT;  Surgeon: Kelly Prajapati MD;  Location: [facility] OR;  Service: Anesthesiology;  Laterality: Bilateral;       Family History  Family History   Problem Relation Age of Onset    Atrial fibrillation Mother        Social History  Social History     Socioeconomic History    Marital status: Single     Spouse name: Not on file    Number of children: Not on file    Years of education: Not on file    Highest education level: Not on file   Occupational History    Not on file   Social Needs    Financial resource strain: Not on file    Food insecurity:     Worry: Not on file     Inability: Not on file    Transportation needs:     Medical: Not on file     Non-medical: Not on file   Tobacco Use    Smoking status: Never Smoker    Smokeless tobacco: Never Used   Substance and Sexual Activity    Alcohol use: No    Drug use: No    Sexual activity: Not on file   Lifestyle    Physical activity:     Days per week: Not on file     Minutes per session: Not on file    Stress: Not on file   Relationships    Social connections:      Talks on phone: Not on file     Gets together: Not on file     Attends Orthodoxy service: Not on file     Active member of club or organization: Not on file     Attends meetings of clubs or organizations: Not on file     Relationship status: Not on file   Other Topics Concern    Not on file   Social History Narrative    Not on file        Review of Systems  14-point review of systems as follows:   No check adroe indicates NEGATIVE response   Constitutional: [] weight loss, [] change to appetite   Eyes: [] change in vision, [] double vision   Ears, nose, mouth, throat: [] frequent nose bleeds, [] ringing in the ears   Respiratory: [x] cough, [] wheezing   Cardiovascular: [] chest pain, [] palpitations   Gastrointestinal: [] jaundice, [] nausea/vomiting   Genitourinary: [] incontinence, [] burning with urination   Hematologic/lymphatic: [x] easy bruising/bleeding, [] night sweats   Neurological: [x] numbness, [x] weakness   Endocrine: [x] fatigue, [x] heat/cold intolerance   Allergy/Immunologic: [] fevers, [] chills   Musculoskeletal: [x] muscle pain, [x] joint pain   Psychiatric: [] thoughts of harming self/others, [] depression   Integumentary: [] rashes, [] sores that do not heal     Objective:        Vitals:    09/24/19 1406   BP: 107/74   Pulse: 96   Resp: 18     Body mass index is 24.02 kg/m².     Bilateral length dependent numbness to pinprick and cold not respective dermatomal distribution; vibration and proprioception intact   No atrophy   Reflexes 2+   Strength 5/5 except hip flexors bilateral 4/5     7/26/19 -   Patient appears more kyphotic than usual   Exquisite tenderness over the low thoracic spine  Normal tone in the lower extremities  All lumbar myotomes 5/5  Patella and Achilles 2+ bilateral  No clonus  (patient has baseline upgoing toes)   Patient is ambulatory    5/8/19 -   LUMBAR SPINE:  INSPECTION: no lesions   ROM: normal flexion but limited extension and lateral rotation   MUSCLE SPASM:  mild    PARASPINAL TENDERNESS: bilateral   FACET LOADING: bilateral   SI JOINT TENDERNESS:  Bilateral  JI: neg  STRAIGHT LEG RAISE: neg  CROSSED STRAIGHT LEG RAISE: neg  HIP INTERNAL ROM: normal  HIP EXTERNAL ROM: normal   GT TENDERNESS:  Bilateral  Strength intact in all lumbosacral dermatomes 5/5 bilateral  Reflexes 2+ bilateral  Toes upgoing (chronic)        Data Review:     I have personally reviewed the referring provider's notes, labs, & imaging made available to me today.     RADIOLOGY STUDIES:  I have personally reviewed the pertinent images performed.     6/2019 [facility]  This is an abnormal EMG of the lower extremities. As noted above there was some complexity to the examination.  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  In addition, there are findings that are suggestive, but not diagnostic of a mild peripheral polyneuropathy.  This study does not support a diagnosis of lumbar radiculopathy and there is no evidence of focal neuropathy on this study.  Given her recent onset of symptoms, a repeat study can be performed in 3 months if clinically warranted.       6/25/19 [facility]       7/24/18 MRI thoracic spine without contrast  1.  Severe T9 compression fracture of indeterminate age which is at least partially subacute with minimal bone edema.  2.  Severe L1 compression fracture which appears chronic, with no associated bone marrow edema.  3.  Chronic T11 Schmorl's with no associated bone marrow edema.  4.  Mild multilevel degenerative disc disease  5.  There is no spinal or foraminal stenosis appreciated  6.  Cholelithiasis    07/24/2018 MRI lumbar spine without contrast  1.  Severe chronic L1 compression fracture with no bone marrow edema appreciated  2.  Mild degenerative disc disease and mild to moderate facet arthropathy predominantly lower spine    08/01/2018 [facility] HIDA scan  1.  No evidence of cystic or common duct  obstruction  2.  Normal gallbladder function    6/8/15 MRI lumbar spine w/o contrast:  L1-2: minimal broad based disc protrusion, moderate facet arthropathy  L2-3: mild broad based disc protrusion L > R, annular tear, moderate facet arthropathy, mild stenosis left lateral recess (with mild compression of left L3 root)  L3-4: mild broad based disc protrusion, mild/moderate facet arthropathy   L4-5: mild broad based disc protrusion, moderate/severe facet arthropathy, mild lateral recess stenosis (with mild compression of L5 nerve roots)  L5-S1: transitional vertebra. Mild broad based disc protrusion R > L. Moderate facet arthropathy. Mild/mod compression of right lateral recess with compression of right S1 root.  Chronic L1 fracture   Chronic healed T11 fracture     Results for orders placed or performed during the hospital encounter of 04/03/18   MRI Brain W WO Contrast    Narrative     MRI BRAIN WITH AND WITHOUT CONTRAST    CPT: 34178    Clinical data:  Neurological deficit.  Left hand paralysis.    Comparison: CT head 04/03/2018    Technique: Multiplanar, multisequence MRI of the brain was performed before and after administration of contrast.     Findings:     The diffusion weighted imaging is negative for acute or subacute ischemia.  Gray-white matter differentiation appears within normal limits.  No extra-axial fluid collection, hydrocephalus, mass effect, midline shift is identified.  There is no acute hemorrhage.  Visualized vascular flow voids appear intact.  Visualized orbits appear unremarkable.  Visualized paranasal sinuses and mastoid air cells appear clear.  No mass is identified.  No pathologic enhancement is noted.  There is demonstrated a shallow sella turcica with minimal pituitary tissue identified.    Impression       1. No acute intracranial abnormality.  2. No pathologic enhancement.      Electronically signed by: NITHIN HOYT MD  Date:     04/03/18  Time:    19:40    CT Head Without Contrast     Narrative    CT scan of the head without contrast    Clinical history: Weakness in the fingers of the left hand    [id number]    Findings: Multiple computerized images of the head were obtained in axial projection using thin slices.  No intravenous contrast was administered.  The study was viewed at soft tissue and bone window settings.  Reconstructions were done in coronal and sagittal planes.    No focal lesions are identified.  There is no evidence of ventricular dilatation, midline shift, or hemorrhage.  No territorial infarct was visualized.  The gray-white junction is maintained throughout.  No extracerebral fluid collection was demonstrated.  The bony calvarium appears intact.  No sinus or mastoid abnormality was noted.  The sella is shallow and very small which is of uncertain significance.  This might be better evaluated with an MRI scan.    Impression     There are no acute findings.  Note is made of the sella being shallow and somewhat small which is of uncertain significance.  An MRI scan may be helpful in further evaluation of this area.      Electronically signed by: RUTHIE MO MD  Date:     04/03/18  Time:    16:23        Lab Results   Component Value Date     (L) 04/24/2019     (L) 04/24/2019    K 3.9 04/24/2019    K 3.9 04/24/2019    MG 1.7 04/03/2018     04/24/2019     04/24/2019    CO2 26 04/24/2019    CO2 26 04/24/2019    BUN 9 04/24/2019    BUN 9 04/24/2019    CREATININE 0.9 04/24/2019    CREATININE 0.9 04/24/2019    GLU 71 04/24/2019    GLU 71 04/24/2019    HGBA1C 4.9 04/04/2018    AST 41 (H) 04/24/2019    AST 41 (H) 04/24/2019    ALT 12 04/24/2019    ALT 12 04/24/2019    ALBUMIN 3.7 04/24/2019    ALBUMIN 3.7 04/24/2019    PROT 7.1 04/24/2019    PROT 7.1 04/24/2019    BILITOT 0.3 04/24/2019    BILITOT 0.3 04/24/2019       Lab Results   Component Value Date    WBC 5.49 06/17/2018    HGB 11.1 (L) 06/17/2018    HCT 33.1 (L) 06/17/2018    MCV 85 06/17/2018      06/17/2018       Lab Results   Component Value Date    TSH 0.575 04/24/2019       Assessment & Plan:       Problem List Items Addressed This Visit        Neuro    Compression fracture of body of thoracic vertebra    Overview     Chronic L1  T9 sustained after a fall in April 2018  New T11 sustained after a fall in late June 2019 with dramatically worsening back pain; new radicular pain in the left thigh; worsening kyphosis evident on exam and on x-ray    Overall trying to minimize steroids given her chronic oral steroid use, and multiple steroid induced symptoms (osteoporosis, myopathy, probable small fiber neuropathy)              Psychiatric    Chronically on opiate therapy    Overview     Patient would like me to take over her chronic pain management. Reviewed her previous pain physician's notes which show good relief and no signs of adverse behavior. She has been on the same regimen for chronic low back pain for years. Her UDS was consistent with prescribed substances. Opiod risk tool shows she is low risk with score 1. Her MME is low at 60 MME daily.     chronic opiate contract on file  Continue MS Contin 15 yolanda BID   Continue Percocet 7.5mg/325mg QID prn             Endocrine    Panhypopituitarism    Overview     Since birth, on chronic hydrocortisone and thyroid replacement            Orthopedic    Chronic bilateral low back pain without sciatica    Overview     Combination of degenerative disc disease facet arthropathy chronic L1 compression fracture.         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                      TESTING:  -- schedule cervical xray to evaluate for cause of new left occipital neuralgia   -- return for skin biopsy to test for small fiber neuropathy as a cause of your feet tingling     REFERRAL:  -- none    PREVENTION OF PAIN:   -- continue duloxetine 20mg daily - take in the MORNING to help with insomnia   -- continue gabapentin 300mg at night to help with nerve pain in the feet     AS-NEEDED TREATMENT OF PAIN:  -- continue MS Contin 15mg twice per day   -- continue Percocet 7.5mg/325mg 4x per day #150 this month accounting for dental work, #120 next month   -- continue Flexeril 10mg twice a day     Follow up in about 1 week (around 10/1/2019) for skin biopsy (right leg).     Kelly Prajapati MD

## 2019-09-25 ENCOUNTER — TELEPHONE (OUTPATIENT)
Dept: NEUROLOGY | Facility: CLINIC | Age: 44
End: 2019-09-25

## 2019-10-13 PROBLEM — E87.1 HYPONATREMIA: Status: ACTIVE | Noted: 2019-10-13

## 2019-10-13 PROBLEM — E43 SEVERE PROTEIN-CALORIE MALNUTRITION: Status: ACTIVE | Noted: 2019-10-13

## 2019-10-14 PROBLEM — E87.8 ELECTROLYTE ABNORMALITY: Status: ACTIVE | Noted: 2019-10-14

## 2019-10-15 ENCOUNTER — PATIENT MESSAGE (OUTPATIENT)
Dept: NEUROLOGY | Facility: CLINIC | Age: 44
End: 2019-10-15

## 2019-10-15 DIAGNOSIS — M54.50 CHRONIC BILATERAL LOW BACK PAIN WITHOUT SCIATICA: ICD-10-CM

## 2019-10-15 DIAGNOSIS — G89.29 CHRONIC BILATERAL LOW BACK PAIN WITHOUT SCIATICA: ICD-10-CM

## 2019-10-15 DIAGNOSIS — Z79.891 CHRONICALLY ON OPIATE THERAPY: ICD-10-CM

## 2019-10-18 ENCOUNTER — PATIENT MESSAGE (OUTPATIENT)
Dept: NEUROLOGY | Facility: CLINIC | Age: 44
End: 2019-10-18

## 2019-10-18 RX ORDER — MORPHINE SULFATE 15 MG/1
15 TABLET, FILM COATED, EXTENDED RELEASE ORAL 2 TIMES DAILY
Qty: 60 TABLET | Refills: 0 | Status: SHIPPED | OUTPATIENT
Start: 2019-10-18 | End: 2019-11-17

## 2019-10-28 ENCOUNTER — PATIENT MESSAGE (OUTPATIENT)
Dept: NEUROLOGY | Facility: CLINIC | Age: 44
End: 2019-10-28

## 2019-10-29 ENCOUNTER — TELEPHONE (OUTPATIENT)
Dept: NEUROLOGY | Facility: CLINIC | Age: 44
End: 2019-10-29

## 2019-10-29 ENCOUNTER — PATIENT MESSAGE (OUTPATIENT)
Dept: NEUROLOGY | Facility: CLINIC | Age: 44
End: 2019-10-29

## 2019-10-29 NOTE — TELEPHONE ENCOUNTER
----- Message from Beata Alexander sent at 10/29/2019 12:09 PM CDT -----  Contact: self [phone]  She is calling to cancel the appt for today.  She is having other medical issues at this time.  Thank you!

## 2019-11-04 ENCOUNTER — PATIENT MESSAGE (OUTPATIENT)
Dept: NEUROLOGY | Facility: CLINIC | Age: 44
End: 2019-11-04

## 2019-11-04 DIAGNOSIS — G89.29 CHRONIC BILATERAL LOW BACK PAIN WITHOUT SCIATICA: Primary | ICD-10-CM

## 2019-11-04 DIAGNOSIS — M54.50 CHRONIC BILATERAL LOW BACK PAIN WITHOUT SCIATICA: Primary | ICD-10-CM

## 2019-11-04 RX ORDER — CYCLOBENZAPRINE HCL 10 MG
10 TABLET ORAL 2 TIMES DAILY PRN
Qty: 60 TABLET | Refills: 0 | Status: SHIPPED | OUTPATIENT
Start: 2019-11-04 | End: 2019-11-08 | Stop reason: SDUPTHER

## 2019-11-04 RX ORDER — CYCLOBENZAPRINE HCL 5 MG
5 TABLET ORAL 3 TIMES DAILY PRN
Status: CANCELLED | OUTPATIENT
Start: 2019-11-04 | End: 2019-11-14

## 2019-11-07 ENCOUNTER — PATIENT MESSAGE (OUTPATIENT)
Dept: NEUROLOGY | Facility: CLINIC | Age: 44
End: 2019-11-07

## 2019-11-07 DIAGNOSIS — G89.29 CHRONIC BILATERAL LOW BACK PAIN WITHOUT SCIATICA: Primary | ICD-10-CM

## 2019-11-07 DIAGNOSIS — Z79.891 CHRONICALLY ON OPIATE THERAPY: ICD-10-CM

## 2019-11-07 DIAGNOSIS — M54.50 CHRONIC BILATERAL LOW BACK PAIN WITHOUT SCIATICA: Primary | ICD-10-CM

## 2019-11-08 ENCOUNTER — PATIENT MESSAGE (OUTPATIENT)
Dept: NEUROLOGY | Facility: CLINIC | Age: 44
End: 2019-11-08

## 2019-11-08 RX ORDER — MORPHINE SULFATE 15 MG/1
15 TABLET, FILM COATED, EXTENDED RELEASE ORAL 2 TIMES DAILY
Qty: 60 TABLET | Refills: 0 | Status: SHIPPED | OUTPATIENT
Start: 2019-11-14 | End: 2019-12-16 | Stop reason: SDUPTHER

## 2019-11-08 RX ORDER — OXYCODONE HYDROCHLORIDE 5 MG/1
7.5 TABLET ORAL EVERY 6 HOURS PRN
Qty: 180 TABLET | Refills: 0 | Status: SHIPPED | OUTPATIENT
Start: 2019-11-14 | End: 2019-12-13 | Stop reason: SDUPTHER

## 2019-11-08 RX ORDER — CYCLOBENZAPRINE HCL 10 MG
10 TABLET ORAL 3 TIMES DAILY PRN
Qty: 90 TABLET | Refills: 0 | Status: SHIPPED | OUTPATIENT
Start: 2019-11-14 | End: 2019-12-13 | Stop reason: SDUPTHER

## 2019-11-08 NOTE — TELEPHONE ENCOUNTER
Change percocet 7.5mg to oxycodone 7.5mg (1.5 tablet of 5mg) 4x per day given transaminitis  Return to previous dose flexeril 10mg TID

## 2019-12-12 ENCOUNTER — PATIENT MESSAGE (OUTPATIENT)
Dept: NEUROLOGY | Facility: CLINIC | Age: 44
End: 2019-12-12

## 2019-12-12 DIAGNOSIS — Z79.891 CHRONICALLY ON OPIATE THERAPY: ICD-10-CM

## 2019-12-12 DIAGNOSIS — G89.29 CHRONIC BILATERAL LOW BACK PAIN WITHOUT SCIATICA: ICD-10-CM

## 2019-12-12 DIAGNOSIS — M54.50 CHRONIC BILATERAL LOW BACK PAIN WITHOUT SCIATICA: ICD-10-CM

## 2019-12-13 ENCOUNTER — PATIENT MESSAGE (OUTPATIENT)
Dept: NEUROLOGY | Facility: CLINIC | Age: 44
End: 2019-12-13

## 2019-12-13 RX ORDER — OXYCODONE AND ACETAMINOPHEN 7.5; 325 MG/1; MG/1
1 TABLET ORAL EVERY 6 HOURS PRN
Qty: 120 TABLET | Refills: 0 | Status: CANCELLED | OUTPATIENT
Start: 2019-12-14 | End: 2020-01-13

## 2019-12-13 RX ORDER — OXYCODONE HYDROCHLORIDE 5 MG/1
7.5 TABLET ORAL EVERY 6 HOURS PRN
Qty: 180 TABLET | Refills: 0 | Status: SHIPPED | OUTPATIENT
Start: 2019-12-14 | End: 2020-01-09 | Stop reason: SDUPTHER

## 2019-12-13 RX ORDER — CYCLOBENZAPRINE HCL 10 MG
10 TABLET ORAL 3 TIMES DAILY PRN
Qty: 90 TABLET | Refills: 11 | Status: SHIPPED | OUTPATIENT
Start: 2019-12-14

## 2019-12-14 ENCOUNTER — PATIENT MESSAGE (OUTPATIENT)
Dept: NEUROLOGY | Facility: CLINIC | Age: 44
End: 2019-12-14

## 2019-12-14 DIAGNOSIS — M54.50 CHRONIC BILATERAL LOW BACK PAIN WITHOUT SCIATICA: ICD-10-CM

## 2019-12-14 DIAGNOSIS — Z79.891 CHRONICALLY ON OPIATE THERAPY: ICD-10-CM

## 2019-12-14 DIAGNOSIS — G89.29 CHRONIC BILATERAL LOW BACK PAIN WITHOUT SCIATICA: ICD-10-CM

## 2019-12-16 ENCOUNTER — PATIENT MESSAGE (OUTPATIENT)
Dept: NEUROLOGY | Facility: CLINIC | Age: 44
End: 2019-12-16

## 2019-12-16 RX ORDER — MORPHINE SULFATE 15 MG/1
15 TABLET, FILM COATED, EXTENDED RELEASE ORAL 2 TIMES DAILY
Qty: 60 TABLET | Refills: 0 | Status: SHIPPED | OUTPATIENT
Start: 2019-12-16 | End: 2020-01-09 | Stop reason: SDUPTHER

## 2020-01-09 ENCOUNTER — PATIENT MESSAGE (OUTPATIENT)
Dept: NEUROLOGY | Facility: CLINIC | Age: 45
End: 2020-01-09

## 2020-01-09 DIAGNOSIS — M54.50 CHRONIC BILATERAL LOW BACK PAIN WITHOUT SCIATICA: ICD-10-CM

## 2020-01-09 DIAGNOSIS — Z79.891 CHRONICALLY ON OPIATE THERAPY: ICD-10-CM

## 2020-01-09 DIAGNOSIS — G89.29 CHRONIC BILATERAL LOW BACK PAIN WITHOUT SCIATICA: ICD-10-CM

## 2020-01-09 RX ORDER — MORPHINE SULFATE 15 MG/1
15 TABLET, FILM COATED, EXTENDED RELEASE ORAL 2 TIMES DAILY
Qty: 60 TABLET | Refills: 0 | Status: SHIPPED | OUTPATIENT
Start: 2020-01-13 | End: 2020-02-12

## 2020-01-09 RX ORDER — OXYCODONE HYDROCHLORIDE 5 MG/1
7.5 TABLET ORAL EVERY 6 HOURS PRN
Qty: 180 TABLET | Refills: 0 | Status: SHIPPED | OUTPATIENT
Start: 2020-01-13 | End: 2020-02-12

## 2020-01-14 ENCOUNTER — TELEPHONE (OUTPATIENT)
Dept: NEUROLOGY | Facility: CLINIC | Age: 45
End: 2020-01-14

## 2020-01-14 ENCOUNTER — PROCEDURE VISIT (OUTPATIENT)
Dept: NEUROLOGY | Facility: CLINIC | Age: 45
End: 2020-01-14
Payer: MEDICARE

## 2020-01-14 VITALS
RESPIRATION RATE: 18 BRPM | BODY MASS INDEX: 21.2 KG/M2 | WEIGHT: 105.19 LBS | SYSTOLIC BLOOD PRESSURE: 89 MMHG | HEIGHT: 59 IN | DIASTOLIC BLOOD PRESSURE: 66 MMHG | HEART RATE: 98 BPM

## 2020-01-14 DIAGNOSIS — R20.9 DISTURBANCE OF SKIN SENSATION: Primary | ICD-10-CM

## 2020-01-14 DIAGNOSIS — G60.3 IDIOPATHIC PROGRESSIVE POLYNEUROPATHY: ICD-10-CM

## 2020-01-14 PROCEDURE — 11105 PR PUNCH BIOPSY, SKIN, EA ADDTL LESION: ICD-10-PCS | Mod: S$GLB,,, | Performed by: PSYCHIATRY & NEUROLOGY

## 2020-01-14 PROCEDURE — 11104 PUNCH BX SKIN SINGLE LESION: CPT | Mod: S$GLB,,, | Performed by: PSYCHIATRY & NEUROLOGY

## 2020-01-14 PROCEDURE — 11104 PR PUNCH BIOPSY, SKIN, SINGLE LESION: ICD-10-PCS | Mod: S$GLB,,, | Performed by: PSYCHIATRY & NEUROLOGY

## 2020-01-14 PROCEDURE — 11105 PUNCH BX SKIN EA SEP/ADDL: CPT | Mod: S$GLB,,, | Performed by: PSYCHIATRY & NEUROLOGY

## 2020-01-14 NOTE — PROCEDURES
Procedures     PROCEDURE: Epidermal Nerve Fiber Density Evaluation (Skin biopsy)    Diagnosis Code: R20.8 Other disturbances of skin sensation , idiopathic progressive peripheral neuropathy    An informed consent was obtained for a Skin Biopsy Nerve Evaluation and the signed copy is in the patient's file.      The patient was placed in the supine position for the procedure. After measurements were obtained and alcohol prep was done, 0.5cc of 1% Lidocaine with Epinephrine was used as local anesthetic.     A 1st 3-mm punch biopsy with a Dermapunch was obtained from the left side at the:   [] Proximal Arm    [] Distal Arm    [x] Proximal Thigh    [] Distal Thigh   [] Calf    [] Ankle    [] Foot     The above procedure was repeated and a 2nd 3-mm punch biopsy with a Dermapunch was obtained from the left side at the:   [] Proximal Arm    [] Distal Arm    [] Proximal Thigh    [x] Distal Thigh   [] Calf    [] Ankle    [] Foot     The above procedure was repeated and a 3rd 3-mm punch biopsy with a Dermapunch was obtained from the left side at the:   [] Proximal Arm    [] Distal Arm    [] Proximal Thigh    [] Distal Thigh   [x] Calf    [] Ankle    [] Foot     The sample(s) was/were placed in the vial containing the fixative and will be processed for Nerve Fiber Density Testing. Samples will be sent to [facility] located at [address] [place] [address].    Bleeding was controlled and band aid(s) were placed over the biopsy sites. The procedure was tolerated:    The patient was provided with information on wound care and provided a copy of the post biopsy instructions

## 2020-01-24 ENCOUNTER — TELEPHONE (OUTPATIENT)
Dept: NEUROLOGY | Facility: CLINIC | Age: 45
End: 2020-01-24

## 2020-01-24 NOTE — TELEPHONE ENCOUNTER
----- Message from Robbie Alas sent at 2020  2:51 PM CST -----  Contact: Pt mother sheridan   Type: Needs Medical Advice    Who Called:  Pt mother Sheridan   Symptoms (please be specific):    How long has patient had these symptoms:    Pharmacy name and phone #:    Best Call Back Number:    Additional Information: pt mother called saying her daughter is .

## 2020-01-24 NOTE — TELEPHONE ENCOUNTER
Returned call and spoke with patient's mother, Sheridan. Patient passed away unexpectedly this morning and was found in her bed. Expressed that we are sorry to hear of the passing and that if she needed anything to let us know. Sheridan verbalized understanding. Sheridan did have one request and that was to still contact her once the results of the skin biopsy were received.

## 2020-02-14 ENCOUNTER — TELEPHONE (OUTPATIENT)
Dept: NEUROLOGY | Facility: CLINIC | Age: 45
End: 2020-02-14

## 2020-02-18 ENCOUNTER — TELEPHONE (OUTPATIENT)
Dept: NEUROLOGY | Facility: CLINIC | Age: 45
End: 2020-02-18

## 2020-02-18 NOTE — TELEPHONE ENCOUNTER
Returned call and spoke with patient's family. Informed them per patient's skin biopsy results. Results were positive and the results have been sent to scanning. Family verbalized understanding.

## 2020-02-18 NOTE — TELEPHONE ENCOUNTER
----- Message from Aby Faulkner sent at 2/18/2020 12:30 PM CST -----  Contact: Ms Madisyn Ramirez /   Sister in law [phone]  Type:  Test Results    Who Called: Ms Ramirez states calling to get test results of patient    Ms James meier patient has passed on 1/24/2020   Name of Test (Lab/Mammo/Etc): Biopsy  Date of Test:   Ordering Provider: Dr Prajapati  Where the test was performed: [facility]  Would the patient rather a call back or a response via [facility]?call  Best Call Back Number: [phone]  Additional Information:

## 2020-02-18 NOTE — TELEPHONE ENCOUNTER
----- Message from Rosalinda Morales sent at 2/18/2020 10:28 AM CST -----  Contact: patient  PT is calling for test results    Pt can be reached at [phone]    Thanks  KB

## 2020-08-18 NOTE — TELEPHONE ENCOUNTER
----- Message from Rell Tello sent at 9/5/2018 10:42 AM CDT -----  Type: Needs Medical Advice    Who Called:  Self   Symptoms (please be specific): involuntary movement in both legs How long has patient had these symptoms:  BOB   Pharmacy name and phone #:  [facility]  Best Call Back Number: [phone]  Additional Information: BOB     Notified Dr. Corado that patient has not voided, performed bladder scan and it showed retention of 999cc of urine. Dr. Corado gave order to insert adhikari catheter.

## 2021-05-10 ENCOUNTER — PATIENT MESSAGE (OUTPATIENT)
Dept: RESEARCH | Facility: HOSPITAL | Age: 46
End: 2021-05-10

## (undated) DEVICE — APPLICATOR CHLORAPREP CLR 10.5

## (undated) DEVICE — NDL 18GA X1 1/2 REG BEVEL

## (undated) DEVICE — NDL SPINAL SPINOCAN 22GX3.5

## (undated) DEVICE — MARKER SKIN STND TIP BLUE BARR

## (undated) DEVICE — GLOVE SURGICAL LATEX SZ 7

## (undated) DEVICE — TRAY NERVE BLOCK

## (undated) DEVICE — SEE MEDLINE ITEM 152622

## (undated) DEVICE — CANNULA CVD 100MM X 20G

## (undated) DEVICE — PAD ELECTROSURGICAL PAT PLATE